# Patient Record
Sex: FEMALE | Race: WHITE | NOT HISPANIC OR LATINO
[De-identification: names, ages, dates, MRNs, and addresses within clinical notes are randomized per-mention and may not be internally consistent; named-entity substitution may affect disease eponyms.]

---

## 2017-06-19 ENCOUNTER — RESULT REVIEW (OUTPATIENT)
Age: 47
End: 2017-06-19

## 2019-11-29 ENCOUNTER — EMERGENCY (EMERGENCY)
Facility: HOSPITAL | Age: 49
LOS: 0 days | Discharge: HOME | End: 2019-11-29
Attending: HOSPITALIST
Payer: COMMERCIAL

## 2019-11-29 VITALS — WEIGHT: 139.99 LBS | HEIGHT: 64 IN

## 2019-11-29 VITALS
DIASTOLIC BLOOD PRESSURE: 60 MMHG | HEART RATE: 80 BPM | OXYGEN SATURATION: 97 % | TEMPERATURE: 99 F | RESPIRATION RATE: 18 BRPM | SYSTOLIC BLOOD PRESSURE: 109 MMHG

## 2019-11-29 DIAGNOSIS — R50.9 FEVER, UNSPECIFIED: ICD-10-CM

## 2019-11-29 DIAGNOSIS — M79.10 MYALGIA, UNSPECIFIED SITE: ICD-10-CM

## 2019-11-29 DIAGNOSIS — Z98.891 HISTORY OF UTERINE SCAR FROM PREVIOUS SURGERY: Chronic | ICD-10-CM

## 2019-11-29 DIAGNOSIS — R51 HEADACHE: ICD-10-CM

## 2019-11-29 DIAGNOSIS — N39.0 URINARY TRACT INFECTION, SITE NOT SPECIFIED: ICD-10-CM

## 2019-11-29 DIAGNOSIS — B34.9 VIRAL INFECTION, UNSPECIFIED: ICD-10-CM

## 2019-11-29 LAB
ALBUMIN SERPL ELPH-MCNC: 3.2 G/DL — LOW (ref 3.3–5)
ALP SERPL-CCNC: 53 U/L — SIGNIFICANT CHANGE UP (ref 40–120)
ALT FLD-CCNC: 25 U/L — SIGNIFICANT CHANGE UP (ref 12–78)
ANION GAP SERPL CALC-SCNC: 8 MMOL/L — SIGNIFICANT CHANGE UP (ref 5–17)
APPEARANCE UR: ABNORMAL
AST SERPL-CCNC: 22 U/L — SIGNIFICANT CHANGE UP (ref 15–37)
BASOPHILS # BLD AUTO: 0.04 K/UL — SIGNIFICANT CHANGE UP (ref 0–0.2)
BASOPHILS NFR BLD AUTO: 0.2 % — SIGNIFICANT CHANGE UP (ref 0–2)
BILIRUB SERPL-MCNC: 0.5 MG/DL — SIGNIFICANT CHANGE UP (ref 0.2–1.2)
BILIRUB UR-MCNC: NEGATIVE — SIGNIFICANT CHANGE UP
BUN SERPL-MCNC: 19 MG/DL — SIGNIFICANT CHANGE UP (ref 7–23)
CALCIUM SERPL-MCNC: 9.2 MG/DL — SIGNIFICANT CHANGE UP (ref 8.5–10.1)
CHLORIDE SERPL-SCNC: 97 MMOL/L — SIGNIFICANT CHANGE UP (ref 96–108)
CO2 SERPL-SCNC: 28 MMOL/L — SIGNIFICANT CHANGE UP (ref 22–31)
COLOR SPEC: YELLOW — SIGNIFICANT CHANGE UP
CREAT SERPL-MCNC: 1.12 MG/DL — SIGNIFICANT CHANGE UP (ref 0.5–1.3)
DIFF PNL FLD: ABNORMAL
EOSINOPHIL # BLD AUTO: 0.02 K/UL — SIGNIFICANT CHANGE UP (ref 0–0.5)
EOSINOPHIL NFR BLD AUTO: 0.1 % — SIGNIFICANT CHANGE UP (ref 0–6)
FLU A RESULT: SIGNIFICANT CHANGE UP
FLU A RESULT: SIGNIFICANT CHANGE UP
FLUAV AG NPH QL: SIGNIFICANT CHANGE UP
FLUBV AG NPH QL: SIGNIFICANT CHANGE UP
GLUCOSE SERPL-MCNC: 108 MG/DL — HIGH (ref 70–99)
GLUCOSE UR QL: NEGATIVE MG/DL — SIGNIFICANT CHANGE UP
HCT VFR BLD CALC: 40.8 % — SIGNIFICANT CHANGE UP (ref 34.5–45)
HGB BLD-MCNC: 13.5 G/DL — SIGNIFICANT CHANGE UP (ref 11.5–15.5)
IMM GRANULOCYTES NFR BLD AUTO: 0.7 % — SIGNIFICANT CHANGE UP (ref 0–1.5)
KETONES UR-MCNC: ABNORMAL
LEUKOCYTE ESTERASE UR-ACNC: ABNORMAL
LYMPHOCYTES # BLD AUTO: 1.13 K/UL — SIGNIFICANT CHANGE UP (ref 1–3.3)
LYMPHOCYTES # BLD AUTO: 5.3 % — LOW (ref 13–44)
MCHC RBC-ENTMCNC: 30 PG — SIGNIFICANT CHANGE UP (ref 27–34)
MCHC RBC-ENTMCNC: 33.1 GM/DL — SIGNIFICANT CHANGE UP (ref 32–36)
MCV RBC AUTO: 90.7 FL — SIGNIFICANT CHANGE UP (ref 80–100)
MONOCYTES # BLD AUTO: 2.86 K/UL — HIGH (ref 0–0.9)
MONOCYTES NFR BLD AUTO: 13.5 % — SIGNIFICANT CHANGE UP (ref 2–14)
NEUTROPHILS # BLD AUTO: 16.99 K/UL — HIGH (ref 1.8–7.4)
NEUTROPHILS NFR BLD AUTO: 80.2 % — HIGH (ref 43–77)
NITRITE UR-MCNC: NEGATIVE — SIGNIFICANT CHANGE UP
PH UR: 5 — SIGNIFICANT CHANGE UP (ref 5–8)
PLATELET # BLD AUTO: 336 K/UL — SIGNIFICANT CHANGE UP (ref 150–400)
POTASSIUM SERPL-MCNC: 3.6 MMOL/L — SIGNIFICANT CHANGE UP (ref 3.5–5.3)
POTASSIUM SERPL-SCNC: 3.6 MMOL/L — SIGNIFICANT CHANGE UP (ref 3.5–5.3)
PROT SERPL-MCNC: 8.2 GM/DL — SIGNIFICANT CHANGE UP (ref 6–8.3)
PROT UR-MCNC: 100 MG/DL
RBC # BLD: 4.5 M/UL — SIGNIFICANT CHANGE UP (ref 3.8–5.2)
RBC # FLD: 13.3 % — SIGNIFICANT CHANGE UP (ref 10.3–14.5)
RSV RESULT: SIGNIFICANT CHANGE UP
RSV RNA RESP QL NAA+PROBE: SIGNIFICANT CHANGE UP
SODIUM SERPL-SCNC: 133 MMOL/L — LOW (ref 135–145)
SP GR SPEC: 1.02 — SIGNIFICANT CHANGE UP (ref 1.01–1.02)
UROBILINOGEN FLD QL: NEGATIVE MG/DL — SIGNIFICANT CHANGE UP
WBC # BLD: 21.18 K/UL — HIGH (ref 3.8–10.5)
WBC # FLD AUTO: 21.18 K/UL — HIGH (ref 3.8–10.5)

## 2019-11-29 PROCEDURE — 80053 COMPREHEN METABOLIC PANEL: CPT

## 2019-11-29 PROCEDURE — 99284 EMERGENCY DEPT VISIT MOD MDM: CPT | Mod: 25

## 2019-11-29 PROCEDURE — 85007 BL SMEAR W/DIFF WBC COUNT: CPT

## 2019-11-29 PROCEDURE — 96361 HYDRATE IV INFUSION ADD-ON: CPT

## 2019-11-29 PROCEDURE — 36415 COLL VENOUS BLD VENIPUNCTURE: CPT

## 2019-11-29 PROCEDURE — 87186 SC STD MICRODIL/AGAR DIL: CPT

## 2019-11-29 PROCEDURE — 85025 COMPLETE CBC W/AUTO DIFF WBC: CPT

## 2019-11-29 PROCEDURE — 81001 URINALYSIS AUTO W/SCOPE: CPT

## 2019-11-29 PROCEDURE — 87086 URINE CULTURE/COLONY COUNT: CPT

## 2019-11-29 PROCEDURE — 71046 X-RAY EXAM CHEST 2 VIEWS: CPT

## 2019-11-29 PROCEDURE — 96374 THER/PROPH/DIAG INJ IV PUSH: CPT

## 2019-11-29 PROCEDURE — 99284 EMERGENCY DEPT VISIT MOD MDM: CPT

## 2019-11-29 PROCEDURE — 87631 RESP VIRUS 3-5 TARGETS: CPT

## 2019-11-29 PROCEDURE — 71046 X-RAY EXAM CHEST 2 VIEWS: CPT | Mod: 26

## 2019-11-29 PROCEDURE — 96375 TX/PRO/DX INJ NEW DRUG ADDON: CPT

## 2019-11-29 RX ORDER — SODIUM CHLORIDE 9 MG/ML
1000 INJECTION INTRAMUSCULAR; INTRAVENOUS; SUBCUTANEOUS ONCE
Refills: 0 | Status: COMPLETED | OUTPATIENT
Start: 2019-11-29 | End: 2019-11-29

## 2019-11-29 RX ORDER — ONDANSETRON 8 MG/1
4 TABLET, FILM COATED ORAL ONCE
Refills: 0 | Status: COMPLETED | OUTPATIENT
Start: 2019-11-29 | End: 2019-11-29

## 2019-11-29 RX ORDER — ACETAMINOPHEN 500 MG
650 TABLET ORAL ONCE
Refills: 0 | Status: COMPLETED | OUTPATIENT
Start: 2019-11-29 | End: 2019-11-29

## 2019-11-29 RX ORDER — CEPHALEXIN 500 MG
1 CAPSULE ORAL
Qty: 14 | Refills: 0
Start: 2019-11-29 | End: 2019-12-05

## 2019-11-29 RX ORDER — KETOROLAC TROMETHAMINE 30 MG/ML
15 SYRINGE (ML) INJECTION ONCE
Refills: 0 | Status: DISCONTINUED | OUTPATIENT
Start: 2019-11-29 | End: 2019-11-29

## 2019-11-29 RX ORDER — CEPHALEXIN 500 MG
500 CAPSULE ORAL ONCE
Refills: 0 | Status: COMPLETED | OUTPATIENT
Start: 2019-11-29 | End: 2019-11-29

## 2019-11-29 RX ADMIN — SODIUM CHLORIDE 1000 MILLILITER(S): 9 INJECTION INTRAMUSCULAR; INTRAVENOUS; SUBCUTANEOUS at 19:15

## 2019-11-29 RX ADMIN — Medication 650 MILLIGRAM(S): at 19:49

## 2019-11-29 RX ADMIN — Medication 500 MILLIGRAM(S): at 19:49

## 2019-11-29 RX ADMIN — Medication 15 MILLIGRAM(S): at 18:21

## 2019-11-29 RX ADMIN — SODIUM CHLORIDE 1000 MILLILITER(S): 9 INJECTION INTRAMUSCULAR; INTRAVENOUS; SUBCUTANEOUS at 18:10

## 2019-11-29 RX ADMIN — SODIUM CHLORIDE 1000 MILLILITER(S): 9 INJECTION INTRAMUSCULAR; INTRAVENOUS; SUBCUTANEOUS at 20:00

## 2019-11-29 RX ADMIN — Medication 15 MILLIGRAM(S): at 19:47

## 2019-11-29 RX ADMIN — SODIUM CHLORIDE 1000 MILLILITER(S): 9 INJECTION INTRAMUSCULAR; INTRAVENOUS; SUBCUTANEOUS at 21:00

## 2019-11-29 RX ADMIN — ONDANSETRON 4 MILLIGRAM(S): 8 TABLET, FILM COATED ORAL at 18:21

## 2019-11-29 NOTE — ED STATDOCS - PROGRESS NOTE DETAILS
48 yr. old female PMH: UC, presents to ED with fever for 3 days and max of 103.5 + headache and body aches. 48 yr. old female PMH: UC,  presents to ED with fever for 3 days and max of 103.5 + headache and body aches. Reviewed results of Lab work and Chest X-Ray. Instructed to take Keflex as prescribed and Ibuprofen 600 mg. PO every 6 hrs. for pain starting tomorrow afternoon. Return precautions reviewed with patient. Lawanda Orozco 48 yr. old female PMH: UC,  presents to ED with fever for 3 days and max of 103.5 + nausea, chills, headache and body aches. Reports no dysuria but fowl odor to urine and dark. Seen at Urgent Care and had negative Flu swab. Seen an examined by attending in intake. Plan: IV, Labs, Toradol, Chest X-ray. Will f/U with results and re evaluate. Lawanda NP Reviewed results of Lab work and Chest X-Ray. Instructed to take Keflex as prescribed and Ibuprofen 600 mg. PO every 6 hrs. for pain starting tomorrow afternoon. Return precautions reviewed with patient.   Lawanda GUTIÉRREZ VS repeated    will order another liter of saline. MTangmarjoriei NP

## 2019-11-29 NOTE — ED STATDOCS - MUSCULOSKELETAL, MLM
range of motion is not limited and there is no muscle tenderness. No nuchal rigidity, full ROM of neck. No CVA tenderness

## 2019-11-29 NOTE — ED STATDOCS - NS_ ATTENDINGSCRIBEDETAILS _ED_A_ED_FT
Faye Nunez MD: The history, relevant review of systems, past medical and surgical history, medical decision making, and physical examination was documented by the scribe in my presence and I attest to the accuracy of the documentation.

## 2019-11-29 NOTE — ED STATDOCS - PATIENT PORTAL LINK FT
You can access the FollowMyHealth Patient Portal offered by [facility] by registering at the following website: [email or web address]. By joining [employer]’s FollowMyHealth portal, you will also be able to view your health information using other applications (apps) compatible with our system. You can access the FollowMyHealth Patient Portal offered by [facility] by registering at the following website: [email or web address]. By joining [employer]’s FollowMyHealth portal, you will also be able to view your health information using other applications (apps) compatible with our system.

## 2019-11-29 NOTE — ED ADULT NURSE NOTE - OBJECTIVE STATEMENT
pt presents to ED c/o fevers w/ nausea and HA x3days, Tmax 103.5. denies vomiting and dizziness. reports taking advil Q4-6hrs w/ minimal relief. Pt AOx4, breathing symmetrical and unlabored, in no acute distress at this time.

## 2019-11-29 NOTE — ED STATDOCS - CLINICAL SUMMARY MEDICAL DECISION MAKING FREE TEXT BOX
49 y/o female with PMHx of ulcerative colitis and  x3 presents to the ED with HA and fever x 3 days. Also with general malaise. Pt is well appearing likely viral. Will get labs with flu PCR, give fluids Toradol and reassess. No clinical signs or sx of meningitis.

## 2019-11-29 NOTE — ED STATDOCS - OBJECTIVE STATEMENT
47 y/o female with PMHx of ulcerative colitis and  x3 presents to the ED c/o +fever x3 days. Tmax 103.5. Endorses associated +HA, +body aches, +nausea, and +chills. No neck stiffness or dysuria. Has been taken Advil every 4 hours with no relief. Went to  when sx began 3 days ago and had negative flu swab. Denies sick contacts or recent travel. NKDA. PCP: Dr. Janiya Cade.

## 2019-11-29 NOTE — ED ADULT TRIAGE NOTE - CHIEF COMPLAINT QUOTE
c/o headache and fever for past 2 days, Tmax 103.5, pt states she has been taking advil every 4 hours with no relief in symptoms, pt went to urgent care, had flu test completed which was negative, denies URI symptoms

## 2019-12-01 NOTE — ED POST DISCHARGE NOTE - DETAILS
Urine culture + for E. coli >100K CFU. treated with keflex to which bacteria is sensitive. - TOM RosarioC

## 2019-12-04 ENCOUNTER — TRANSCRIPTION ENCOUNTER (OUTPATIENT)
Age: 49
End: 2019-12-04

## 2019-12-26 ENCOUNTER — RESULT REVIEW (OUTPATIENT)
Age: 49
End: 2019-12-26

## 2020-05-06 NOTE — ED STATDOCS - NSTIMEPROVIDERCAREINITIATE_GEN_ER
Mild KRIS noted with decrease in GFR, reserved creatinine. Will give light IVF hydration. Trend renal function with CMP.      29-Nov-2019 17:12

## 2021-02-23 ENCOUNTER — TRANSCRIPTION ENCOUNTER (OUTPATIENT)
Age: 51
End: 2021-02-23

## 2021-05-04 ENCOUNTER — NON-APPOINTMENT (OUTPATIENT)
Age: 51
End: 2021-05-04

## 2021-05-04 DIAGNOSIS — Z78.9 OTHER SPECIFIED HEALTH STATUS: ICD-10-CM

## 2021-05-04 DIAGNOSIS — Z87.898 PERSONAL HISTORY OF OTHER SPECIFIED CONDITIONS: ICD-10-CM

## 2021-05-04 DIAGNOSIS — Z86.79 PERSONAL HISTORY OF OTHER DISEASES OF THE CIRCULATORY SYSTEM: ICD-10-CM

## 2021-05-04 DIAGNOSIS — N92.6 IRREGULAR MENSTRUATION, UNSPECIFIED: ICD-10-CM

## 2021-05-04 DIAGNOSIS — Z92.89 PERSONAL HISTORY OF OTHER MEDICAL TREATMENT: ICD-10-CM

## 2021-07-28 ENCOUNTER — TRANSCRIPTION ENCOUNTER (OUTPATIENT)
Age: 51
End: 2021-07-28

## 2021-08-17 PROBLEM — K51.90 ULCERATIVE COLITIS, UNSPECIFIED, WITHOUT COMPLICATIONS: Chronic | Status: ACTIVE | Noted: 2019-11-29

## 2021-08-19 ENCOUNTER — APPOINTMENT (OUTPATIENT)
Dept: FAMILY MEDICINE | Facility: CLINIC | Age: 51
End: 2021-08-19
Payer: COMMERCIAL

## 2021-08-19 VITALS
HEART RATE: 58 BPM | TEMPERATURE: 97.5 F | HEIGHT: 64 IN | DIASTOLIC BLOOD PRESSURE: 68 MMHG | SYSTOLIC BLOOD PRESSURE: 100 MMHG | BODY MASS INDEX: 23.9 KG/M2 | OXYGEN SATURATION: 98 % | WEIGHT: 140 LBS

## 2021-08-19 PROCEDURE — 99213 OFFICE O/P EST LOW 20 MIN: CPT

## 2021-08-19 RX ORDER — DEXTROAMPHETAMINE SULFATE, DEXTROAMPHETAMINE SACCHARATE, AMPHETAMINE SULFATE AND AMPHETAMINE ASPARTATE 5; 5; 5; 5 MG/1; MG/1; MG/1; MG/1
20 CAPSULE, EXTENDED RELEASE ORAL DAILY
Refills: 0 | Status: ACTIVE | COMMUNITY
Start: 2021-08-19
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
## 2021-08-19 NOTE — PLAN
[FreeTextEntry1] : Patient is advised to continue with her current regimen, as this does work well for her.  She is also advised to make sure to rely on her support system, and to work on coping mechanisms for her anxiety.  She does fill her medication every 3-4 months, and she does use this as needed.  She will also continue to follow up with neuro for her ADD medication.

## 2021-12-03 ENCOUNTER — APPOINTMENT (OUTPATIENT)
Dept: FAMILY MEDICINE | Facility: CLINIC | Age: 51
End: 2021-12-03

## 2022-01-20 ENCOUNTER — APPOINTMENT (OUTPATIENT)
Dept: FAMILY MEDICINE | Facility: CLINIC | Age: 52
End: 2022-01-20
Payer: COMMERCIAL

## 2022-01-20 ENCOUNTER — MED ADMIN CHARGE (OUTPATIENT)
Age: 52
End: 2022-01-20

## 2022-01-20 VITALS
HEART RATE: 75 BPM | TEMPERATURE: 97.5 F | BODY MASS INDEX: 23.9 KG/M2 | OXYGEN SATURATION: 98 % | HEIGHT: 64 IN | DIASTOLIC BLOOD PRESSURE: 62 MMHG | WEIGHT: 140 LBS | SYSTOLIC BLOOD PRESSURE: 104 MMHG

## 2022-01-20 PROCEDURE — G0008: CPT

## 2022-01-20 PROCEDURE — 90686 IIV4 VACC NO PRSV 0.5 ML IM: CPT

## 2022-01-20 PROCEDURE — 36415 COLL VENOUS BLD VENIPUNCTURE: CPT

## 2022-01-20 PROCEDURE — 99213 OFFICE O/P EST LOW 20 MIN: CPT | Mod: 25

## 2022-01-20 NOTE — CURRENT MEDS
[Takes medication as prescribed] : takes [None] : Patient does not have any barriers to medication adherence [Yes] : Reviewed medication list for presence of high-risk medications. [Benzodiazepines] : benzodiazepines [FreeTextEntry1] : counseled on medication use

## 2022-01-20 NOTE — PHYSICAL EXAM
[No Acute Distress] : no acute distress [Well Nourished] : well nourished [Well Developed] : well developed [No Lymphadenopathy] : no lymphadenopathy [No Respiratory Distress] : no respiratory distress  [No Accessory Muscle Use] : no accessory muscle use [Clear to Auscultation] : lungs were clear to auscultation bilaterally [Soft] : abdomen soft [Non Tender] : non-tender [Non-distended] : non-distended [Normal Posterior Cervical Nodes] : no posterior cervical lymphadenopathy [Normal Anterior Cervical Nodes] : no anterior cervical lymphadenopathy [No Rash] : no rash [Coordination Grossly Intact] : coordination grossly intact [Normal Gait] : normal gait [Normal Mental Status] : the patient's orientation, memory, attention, language and fund of knowledge were normal [Appropriate] : appropriate [Normal Rate] : a normal rate [Normal Rhythm] : a normal rhythm [Normal Tone] : normal tone [Normal Volume] : normal volume [Normal] : normal throught processes [Normal Associations] :  no deficiency [Impaired judgment] : intact judgment [Impaired Insight] : intact insight [Delusions] : exhibited no delusions [Hallucinations] : exhibited no hallucinations [Obsessions] : denied obsessions [Preoccupation with Violence] : denied preoccupation with violent thoughts [Suicidal Ideation] : denied suicidal ideation [Suicidal Intent] : denied suicidal intent [Suicidal Plan] : denied suicidal plans [Homicidal Ideation] : denied homicidal ideation [Homicidal Intent] : denied homicidal intention [Homicidal Plan] : denied homicidal plans

## 2022-01-20 NOTE — HEALTH RISK ASSESSMENT
[Never] : Never [Yes] : Yes [Monthly or less (1 pt)] : Monthly or less (1 point) [1 or 2 (0 pts)] : 1 or 2 (0 points) [Never (0 pts)] : Never (0 points) [No] : In the past 12 months have you used drugs other than those required for medical reasons? No [No falls in past year] : Patient reported no falls in the past year [0] : 2) Feeling down, depressed, or hopeless: Not at all (0) [PHQ-2 Negative - No further assessment needed] : PHQ-2 Negative - No further assessment needed [Audit-CScore] : 1 [[id number]] : 0 [Patient/Caregiver not ready to engage] : , patient/caregiver not ready to engage [AdvancecareDate] : 01/22

## 2022-01-20 NOTE — HISTORY OF PRESENT ILLNESS
[FreeTextEntry1] : here for f/u anxiety  [de-identified] : this is a 50yo pmhx ulcerative colitis/ thrombocytosis, iron deficiency/ anxiety, here for f/u chronic concerns and medication renewal.\par o/w in no acute distress, no other major concerns and reports feeling well. \par will evaluate and manage as appropriate.

## 2022-01-20 NOTE — COUNSELING
[Fall prevention counseling provided] : Fall prevention counseling provided [Behavioral health counseling provided] : Behavioral health counseling provided [Sleep ___ hours/day] : Sleep [unfilled] hours/day [Engage in a relaxing activity] : Engage in a relaxing activity [Plan in advance] : Plan in advance [None] : None [Good understanding] : Patient has a good understanding of lifestyle changes and steps needed to achieve self management goal [de-identified] : Maintain balanced diet of whole grain, fruits and vegetables, lean meats, skinless poultry, fish, beans, soy products, eggs, nuts, and low fat dairy as per FDA dietary guidelines. \par Avoid high calorie/low nutrient foods. \par vegetables/fruits- at least 5 servings/day, \par whole grains- 100% whole grain and at least 3 grams fiber/day.\par reduce/eliminate saturated fat- less than 5% on nutrition labels (ex. dugan, deli meat, hot dogs, fried foods, cookies, ice cream, chips, soft drinks, etc.)\par stay within your FDA recommended daily calorie needs or use the plate method to portion intake. \par Avoid fast food and limit eating out. When eating out choose healthy alternatives (ex. grilled, baked, steamed. low fat dressings. avoid french fries).\par DO NOT CONSUME FOODS YOU ARE ALLERGIC TO DESPITE DIETARY RECOMMENDATIONS.\par \par For more information you can visit www.Health.gov \par \par Incorporate regular physical activity most days of the week. \par Regular aerobic activity- moderate intensity, most days/wk, at least 30min/day- ex. brisk walk 2.5miles/hr, ballroom dancing, water aerobics, light yard work (raking/lawn mowing) actively playing basketball, biking 10miles/hr.\par Muscle strengthening and strength/resistance exercises 2-3days/wk- ex. free weights, resistance bands, your own weight (squats/pull-ups/push-ups).\par Neuro-motor exercises for balance/agility/coordination and flexibility exercises 2-3days/wk, 20-30min/day- ex. yoga and melanie-chi.\par Bone strengthening at least 30min/day, most days of the week- ex. weights, resistance bands, running, brisk walking, jumping rope, stair climbing, tennis.\par Decrease sedentary time. \par LISTEN TO YOUR BODY AND MODIFY ACTIVITY AS NEEDED- DO NOT OVEREXERT YOURSELF DURING PHYSICAL ACTIVITY DESPITE RECOMMENDATION.\par \par For more information you can visit www.Health.gov \par \par Depression/Anxiety are mood disorders. The symptoms of depression/anxiety can affect how you think and feel and how you handle every day activities (work, eating, sleeping). Multiple treatments are available such as psychotherapy/counseling, medications called antidepressants or anxiolytics, or other therapies. \par Some medications can take 2-4 weeks to work and can have side effects; notify our office if you experience any side effects. Suicidal thoughts or attempt may occur in some people, especially if agitated when first initiating medication, before it begins to work; if suicidal thoughts/ideations or suicidal attempt- call 911 for emergency services/go to the nearest emergency department. \par IF IN CRISIS YOU HAVE SUPPORT= CALL 911/EMERGENCY SERVICES, CALL [facility] [phone], CALL OUR OFFICE.

## 2022-01-20 NOTE — ASSESSMENT
[FreeTextEntry1] : anxiety\par reports as well managed on Clonazepam, infrequent use of sx mgmt, reports tolerating well and denies concerns, counseled on medication use. \par medication renewal provided as requested \par attends counseling as needed, refuses need for psychiatry at this time, reports her daily anxiety is well managed and denies need for long acting non- benzo medication at this time. \par check BW cmp - "labs drawn in office" \par \par Ulcerative Colitis\par manages conservatively, restarted Ascol for recent flare\par in no acute distress and o/w offers no complaints \par Gastro Dr. Roberson, scheduling f/u\par \par thrombocytosis/ negative myeloproliferative workup 05/2021\par was under mgmt of Dr. Eagle bee, as per last visit note 05/2021, specialist advised iron supplementation and f/u in 4 months for recheck and continue evaluation, patient reports compliance with iron supplement however has refused to reschedule f/u, NP advised patient to f/u as recommended for further evaluation thrombocytosis, patient continues to refuse, agrees to recheck of cbc/ iron studies. also advised by Dr. Guillermo to complete colonoscopy, patient also has not completed. \par in no acute distress and o/w offers no complaints \par check labs\par \par requesting screen of lipid/ a1c\par in no acute distress and o/w offers no complaints \par annual 05/2022 with Dr. MARILOU Bethea\par flu vaccine administered today, counseled on vaccine, tolerated well. \par Pap 06/18/2021, normal Dr. Navarro \par Mammo 01/05/2022, normal\par CX 2020, reports findings c/w colitis\par continued follow up with PCP for chronic concerns and preventative health management. \par patient verbalized understanding and agrees with plan of care. \par

## 2022-01-21 ENCOUNTER — NON-APPOINTMENT (OUTPATIENT)
Age: 52
End: 2022-01-21

## 2022-01-21 LAB
ALBUMIN SERPL ELPH-MCNC: 4.6 G/DL
ALP BLD-CCNC: 44 U/L
ALT SERPL-CCNC: 23 U/L
ANION GAP SERPL CALC-SCNC: 12 MMOL/L
AST SERPL-CCNC: 30 U/L
BASOPHILS # BLD AUTO: 0.09 K/UL
BASOPHILS NFR BLD AUTO: 1.1 %
BILIRUB SERPL-MCNC: 0.3 MG/DL
BUN SERPL-MCNC: 18 MG/DL
CALCIUM SERPL-MCNC: 9.9 MG/DL
CHLORIDE SERPL-SCNC: 101 MMOL/L
CHOLEST SERPL-MCNC: 252 MG/DL
CO2 SERPL-SCNC: 28 MMOL/L
CREAT SERPL-MCNC: 0.93 MG/DL
EOSINOPHIL # BLD AUTO: 0.35 K/UL
EOSINOPHIL NFR BLD AUTO: 4.1 %
ESTIMATED AVERAGE GLUCOSE: 103 MG/DL
FERRITIN SERPL-MCNC: 87 NG/ML
FOLATE SERPL-MCNC: >20 NG/ML
GLUCOSE SERPL-MCNC: 61 MG/DL
HBA1C MFR BLD HPLC: 5.2 %
HCT VFR BLD CALC: 44.6 %
HDLC SERPL-MCNC: 83 MG/DL
HGB BLD-MCNC: 14.3 G/DL
IMM GRANULOCYTES NFR BLD AUTO: 0.4 %
IRON SATN MFR SERPL: 29 %
IRON SERPL-MCNC: 100 UG/DL
LDLC SERPL CALC-MCNC: 145 MG/DL
LYMPHOCYTES # BLD AUTO: 1.49 K/UL
LYMPHOCYTES NFR BLD AUTO: 17.4 %
MAN DIFF?: NORMAL
MCHC RBC-ENTMCNC: 30.8 PG
MCHC RBC-ENTMCNC: 32.1 GM/DL
MCV RBC AUTO: 95.9 FL
MONOCYTES # BLD AUTO: 0.93 K/UL
MONOCYTES NFR BLD AUTO: 10.9 %
NEUTROPHILS # BLD AUTO: 5.67 K/UL
NEUTROPHILS NFR BLD AUTO: 66.1 %
NONHDLC SERPL-MCNC: 170 MG/DL
PLATELET # BLD AUTO: 454 K/UL
POTASSIUM SERPL-SCNC: 4.2 MMOL/L
PROT SERPL-MCNC: 7.5 G/DL
RBC # BLD: 4.65 M/UL
RBC # FLD: 14 %
SODIUM SERPL-SCNC: 141 MMOL/L
TIBC SERPL-MCNC: 342 UG/DL
TRIGL SERPL-MCNC: 121 MG/DL
UIBC SERPL-MCNC: 242 UG/DL
VIT B12 SERPL-MCNC: 644 PG/ML
WBC # FLD AUTO: 8.56 K/UL

## 2022-02-04 ENCOUNTER — RX RENEWAL (OUTPATIENT)
Age: 52
End: 2022-02-04

## 2022-02-09 ENCOUNTER — RX RENEWAL (OUTPATIENT)
Age: 52
End: 2022-02-09

## 2022-02-10 ENCOUNTER — TRANSCRIPTION ENCOUNTER (OUTPATIENT)
Age: 52
End: 2022-02-10

## 2022-02-14 ENCOUNTER — FORM ENCOUNTER (OUTPATIENT)
Age: 52
End: 2022-02-14

## 2022-02-17 ENCOUNTER — APPOINTMENT (OUTPATIENT)
Dept: FAMILY MEDICINE | Facility: CLINIC | Age: 52
End: 2022-02-17
Payer: COMMERCIAL

## 2022-02-17 PROCEDURE — 99212 OFFICE O/P EST SF 10 MIN: CPT | Mod: 95

## 2022-02-17 NOTE — COUNSELING
[Fall prevention counseling provided] : Fall prevention counseling provided [Behavioral health counseling provided] : Behavioral health counseling provided [Sleep ___ hours/day] : Sleep [unfilled] hours/day [Engage in a relaxing activity] : Engage in a relaxing activity [Plan in advance] : Plan in advance [None] : None [Good understanding] : Patient has a good understanding of lifestyle changes and steps needed to achieve self management goal [de-identified] : Maintain balanced diet of whole grain, fruits and vegetables, lean meats, skinless poultry, fish, beans, soy products, eggs, nuts, and low fat dairy as per FDA dietary guidelines. \par Avoid high calorie/low nutrient foods. \par vegetables/fruits- at least 5 servings/day, \par whole grains- 100% whole grain and at least 3 grams fiber/day.\par reduce/eliminate saturated fat- less than 5% on nutrition labels (ex. dugan, deli meat, hot dogs, fried foods, cookies, ice cream, chips, soft drinks, etc.)\par stay within your FDA recommended daily calorie needs or use the plate method to portion intake. \par Avoid fast food and limit eating out. When eating out choose healthy alternatives (ex. grilled, baked, steamed. low fat dressings. avoid french fries).\par DO NOT CONSUME FOODS YOU ARE ALLERGIC TO DESPITE DIETARY RECOMMENDATIONS.\par \par For more information you can visit www.Health.gov \par \par Incorporate regular physical activity most days of the week. \par Regular aerobic activity- moderate intensity, most days/wk, at least 30min/day- ex. brisk walk 2.5miles/hr, ballroom dancing, water aerobics, light yard work (raking/lawn mowing) actively playing basketball, biking 10miles/hr.\par Muscle strengthening and strength/resistance exercises 2-3days/wk- ex. free weights, resistance bands, your own weight (squats/pull-ups/push-ups).\par Neuro-motor exercises for balance/agility/coordination and flexibility exercises 2-3days/wk, 20-30min/day- ex. yoga and melanie-chi.\par Bone strengthening at least 30min/day, most days of the week- ex. weights, resistance bands, running, brisk walking, jumping rope, stair climbing, tennis.\par Decrease sedentary time. \par LISTEN TO YOUR BODY AND MODIFY ACTIVITY AS NEEDED- DO NOT OVEREXERT YOURSELF DURING PHYSICAL ACTIVITY DESPITE RECOMMENDATION.\par \par For more information you can visit www.Health.gov \par \par Depression/Anxiety are mood disorders. The symptoms of depression/anxiety can affect how you think and feel and how you handle every day activities (work, eating, sleeping). Multiple treatments are available such as psychotherapy/counseling, medications called antidepressants or anxiolytics, or other therapies. \par Some medications can take 2-4 weeks to work and can have side effects; notify our office if you experience any side effects. Suicidal thoughts or attempt may occur in some people, especially if agitated when first initiating medication, before it begins to work; if suicidal thoughts/ideations or suicidal attempt- call 911 for emergency services/go to the nearest emergency department. \par IF IN CRISIS YOU HAVE SUPPORT= CALL 911/EMERGENCY SERVICES, CALL [facility] [phone], CALL OUR OFFICE.

## 2022-02-17 NOTE — REVIEW OF SYSTEMS
[Negative] : Heme/Lymph [Skin Rash] : no skin rash [de-identified] : ADD [de-identified] : anxiety well managed

## 2022-02-17 NOTE — HEALTH RISK ASSESSMENT
[Never] : Never [Yes] : Yes [Monthly or less (1 pt)] : Monthly or less (1 point) [1 or 2 (0 pts)] : 1 or 2 (0 points) [Never (0 pts)] : Never (0 points) [No] : In the past 12 months have you used drugs other than those required for medical reasons? No [No falls in past year] : Patient reported no falls in the past year [0] : 2) Feeling down, depressed, or hopeless: Not at all (0) [PHQ-2 Negative - No further assessment needed] : PHQ-2 Negative - No further assessment needed [Patient/Caregiver not ready to engage] : , patient/caregiver not ready to engage [Audit-CScore] : 1 [ETS7Moelf] : 0 [AdvancecareDate] : 02/22

## 2022-02-17 NOTE — PHYSICAL EXAM
[No Acute Distress] : no acute distress [Well Nourished] : well nourished [Well Developed] : well developed [Normal Sclera/Conjunctiva] : normal sclera/conjunctiva [No Respiratory Distress] : no respiratory distress  [No Accessory Muscle Use] : no accessory muscle use [Soft] : abdomen soft [Non Tender] : non-tender [Non-distended] : non-distended [No Rash] : no rash [Coordination Grossly Intact] : coordination grossly intact [Normal Mental Status] : the patient's orientation, memory, attention, language and fund of knowledge were normal [Appropriate] : appropriate [Normal Rate] : a normal rate [Normal Rhythm] : a normal rhythm [Normal Tone] : normal tone [Normal Volume] : normal volume [Normal] : normal throught processes [Normal Associations] :  no deficiency [Impaired judgment] : intact judgment [Impaired Insight] : intact insight [Delusions] : exhibited no delusions [Hallucinations] : exhibited no hallucinations [Obsessions] : denied obsessions [Preoccupation with Violence] : denied preoccupation with violent thoughts [Suicidal Ideation] : denied suicidal ideation [Suicidal Intent] : denied suicidal intent [Suicidal Plan] : denied suicidal plans [Homicidal Ideation] : denied homicidal ideation [Homicidal Intent] : denied homicidal intention [Homicidal Plan] : denied homicidal plans [de-identified] : o/w unable to assess r/t remote telehealth visit  [de-identified] : o/w unable to assess r/t remote telehealth visit  [de-identified] : per patient

## 2022-02-17 NOTE — HISTORY OF PRESENT ILLNESS
[Home] : at home, [unfilled] , at the time of the visit. [Medical Office: ([facility])___] : at the medical office located in  [Verbal consent obtained from patient] : the patient, [unfilled] [FreeTextEntry1] : here for f/u anxiety  [de-identified] : this is a 52yo pmhx ulcerative colitis/ thrombocytosis, iron deficiency/ anxiety, here for f/u anxiety and medication renewal.\par o/w in no acute distress, no other major concerns and reports feeling well. \par will evaluate and manage as appropriate.

## 2022-02-17 NOTE — ASSESSMENT
[FreeTextEntry1] : anxiety\par reports as well managed on Clonazepam, infrequent use for sx mgmt, reports tolerating well and denies concerns, counseled on medication use. \par medication renewal provided as requested \par conservative measures \par attends counseling as needed, refuses need for psychiatry at this time, reports her daily anxiety is well managed and denies need for long acting non- benzo medication at this time. \par agrees to continued use of telehealth and quarterly in office visit for continued renewals. \par \par other Pmhx \par Ulcerative Colitis\par manages conservatively, restarted Ascol for recent flare\par in no acute distress and o/w offers no complaints \par Gastro Dr. Roberson\par \par thrombocytosis/ negative myeloproliferative workup 05/2021\par Dr. Guillermo hemonc\par most recent BW stable 01/22\par in no acute distress and o/w offers no complaints \par \par elevated ldl and total cholesterol BW 01/22\par c/w lifestyle modifications\par fasting BW at annual \par \par annual scheduled 05/2022 with Dr. MARILOU Bethea\par UTD vaccines\par Pap 06/18/2021, normal Dr. Navarro \par Mammo 01/05/2022, normal\par CX 2020, reports findings c/w colitis\par continued follow up with PCP for chronic concerns and preventative health management. \par patient verbalized understanding and agrees with plan of care. \par

## 2022-02-17 NOTE — CURRENT MEDS
[Takes medication as prescribed] : takes [None] : Patient does not have any barriers to medication adherence [Yes] : Reviewed medication list for presence of high-risk medications. [Benzodiazepines] : benzodiazepines [FreeTextEntry1] : counseled on use, reports taking medication as prescribed, tolerating well.

## 2022-03-24 ENCOUNTER — APPOINTMENT (OUTPATIENT)
Dept: FAMILY MEDICINE | Facility: CLINIC | Age: 52
End: 2022-03-24

## 2022-03-29 ENCOUNTER — APPOINTMENT (OUTPATIENT)
Dept: FAMILY MEDICINE | Facility: CLINIC | Age: 52
End: 2022-03-29
Payer: COMMERCIAL

## 2022-03-29 VITALS
TEMPERATURE: 97.5 F | OXYGEN SATURATION: 97 % | HEART RATE: 78 BPM | WEIGHT: 138 LBS | DIASTOLIC BLOOD PRESSURE: 70 MMHG | HEIGHT: 64 IN | SYSTOLIC BLOOD PRESSURE: 110 MMHG | BODY MASS INDEX: 23.56 KG/M2

## 2022-03-29 PROCEDURE — 36415 COLL VENOUS BLD VENIPUNCTURE: CPT

## 2022-03-29 PROCEDURE — 99213 OFFICE O/P EST LOW 20 MIN: CPT | Mod: 25

## 2022-03-29 RX ORDER — ESTROGEN,ESTER/ME-TESTOSTERONE 1.25-2.5MG
TABLET ORAL
Refills: 0 | Status: DISCONTINUED | COMMUNITY
End: 2022-03-29

## 2022-03-29 RX ORDER — IRON/IRON ASP GLY/FA/MV-MIN 38 125-25-1MG
TABLET ORAL
Refills: 0 | Status: DISCONTINUED | COMMUNITY
End: 2022-03-29

## 2022-03-29 NOTE — REVIEW OF SYSTEMS
[Abdominal Pain] : no abdominal pain [Diarrhea] : diarrhea [Vomiting] : no vomiting [Suicidal] : not suicidal [Anxiety] : anxiety [Depression] : no depression [Negative] : Heme/Lymph

## 2022-03-29 NOTE — HEALTH RISK ASSESSMENT
[Never] : Never [Yes] : Yes [2 - 4 times a month (2 pts)] : 2-4 times a month (2 points) [1 or 2 (0 pts)] : 1 or 2 (0 points) [No] : In the past 12 months have you used drugs other than those required for medical reasons? No [Never (0 pts)] : Never (0 points) [No falls in past year] : Patient reported no falls in the past year [0] : 2) Feeling down, depressed, or hopeless: Not at all (0) [PHQ-2 Negative - No further assessment needed] : PHQ-2 Negative - No further assessment needed [With Patient/Caregiver] : , with patient/caregiver [I will adhere to the patient's wishes.] : I will adhere to the patient's wishes. [Audit-CScore] : 2 [de-identified] : active, exercises regularly,  [de-identified] : well balanced, starting to avoid gluten for UC symptoms. [[id number]] : 0 [FreeTextEntry4] : pt to be provided with forms at check out today.

## 2022-03-29 NOTE — HISTORY OF PRESENT ILLNESS
[FreeTextEntry1] : Follow up anxiety. [de-identified] : Patient is a 50yo female with PMH anxiety, UC, iron deficiency anemia who presents to the office for follow up. \par \par In regards to anxiety, patient has been taking Clonazepam 0.5mg, ½ to 1 tablet every 8 hours as needed for anxiety symptoms.  Pt states she takes Clonazepam approximately 4 times per week.  Patient is not interested in starting an SSRI at this time.  Patient does not follow with psychiatry.  Patient does follow with psychologist as needed.\par \par Last RF for Clonazepam was 02/19/2022 by our office, HOLA Aguirre. \par Has history of ADD, takes Adderall prescribed by Neurology, Dr. Li. \par \par Pt has been having issues with UC recently, has been ongoing for months.  Pt received referral to see Dr. Viktoria Hastings who she will be following up with.

## 2022-03-30 ENCOUNTER — NON-APPOINTMENT (OUTPATIENT)
Age: 52
End: 2022-03-30

## 2022-03-30 LAB
COVID-19 SPIKE DOMAIN ANTIBODY INTERPRETATION: POSITIVE
SARS-COV-2 AB SERPL IA-ACNC: 67.1 U/ML

## 2022-04-30 DIAGNOSIS — Z13.220 ENCOUNTER FOR SCREENING FOR LIPOID DISORDERS: ICD-10-CM

## 2022-04-30 DIAGNOSIS — Z13.1 ENCOUNTER FOR SCREENING FOR DIABETES MELLITUS: ICD-10-CM

## 2022-05-04 ENCOUNTER — APPOINTMENT (OUTPATIENT)
Dept: FAMILY MEDICINE | Facility: CLINIC | Age: 52
End: 2022-05-04
Payer: COMMERCIAL

## 2022-05-04 VITALS
BODY MASS INDEX: 23.56 KG/M2 | SYSTOLIC BLOOD PRESSURE: 110 MMHG | TEMPERATURE: 97.3 F | HEART RATE: 62 BPM | HEIGHT: 64 IN | OXYGEN SATURATION: 97 % | WEIGHT: 138 LBS | DIASTOLIC BLOOD PRESSURE: 60 MMHG

## 2022-05-04 DIAGNOSIS — Z00.00 ENCOUNTER FOR GENERAL ADULT MEDICAL EXAMINATION W/OUT ABNORMAL FINDINGS: ICD-10-CM

## 2022-05-04 DIAGNOSIS — D75.838 OTHER THROMBOCYTOSIS: ICD-10-CM

## 2022-05-04 DIAGNOSIS — E61.1 IRON DEFICIENCY: ICD-10-CM

## 2022-05-04 DIAGNOSIS — K51.90 ULCERATIVE COLITIS, UNSPECIFIED, W/OUT COMPLICATIONS: ICD-10-CM

## 2022-05-04 DIAGNOSIS — Z63.5 DISRUPTION OF FAMILY BY SEPARATION AND DIVORCE: ICD-10-CM

## 2022-05-04 PROCEDURE — 36415 COLL VENOUS BLD VENIPUNCTURE: CPT

## 2022-05-04 PROCEDURE — 99396 PREV VISIT EST AGE 40-64: CPT | Mod: 25

## 2022-05-04 SDOH — SOCIAL STABILITY - SOCIAL INSECURITY: DISRUPTION OF FAMILY BY SEPARATION AND DIVORCE: Z63.5

## 2022-05-04 NOTE — ASSESSMENT
[FreeTextEntry1] : THIAGO LEGER is a 51 year old female here for a physical exam.  She is also here to follow up on medical issues as noted above.\par

## 2022-05-04 NOTE — PLAN
[FreeTextEntry1] : Reviewed age-appropriate preventive screening tests with patient. UTD on mammogram and gyn exams. Might be Due for f/u colonoscopy (since last showed colitis and she has had recent GI sxs c/w flare up of her UC) and she will d/w new Gi (Dr. Minor, has appt for initial eval today with her) about that and schedule if due or if not will clarify when she is next due and let me know. \par \par She deferson ECg today\par \par Revd/recommended Shingrix, Tdap (since she is not sure of date of last booster) and PCV 20 (UC) and she will consider and will d/w GI also. \par \par Check labs today. Continue same meds/doses pending lab results. \par \par LDL goal <=100 ideally. Reviewed LDL goal and ASCVD risk estimate and factors that go into this calculation.  Reviewed risks/benefits of statin med. Reviewed red yeast rice RYR (600-1200 mg daily) risks/benefits as an alternative to statin meds if not ready to consider statin meds. Recommended excellent hydration +/- co Q10 (100-400 mg daily) to decrease risk for statin (or RYR) related myalgias. \par \par Discussed clean eating (eg Mediterranean style eating plan) and regular exercise/staying as physically active as possible.  Include balance exercises and strength training and core strengthening exercises for bone health and to decrease risk for falls. \par \par Cont f/u with neuro, GI (for UC) and H/O (for thrombocytosis if needed but H/O advised her ok to do prn f/u and not regular f/u and as long as her plts are relatively stable I am comfortable with that plan) as recommended by them.\par \par Reviewed importance of good self care (eg meditation, yoga, adequate rest, regular exercise, magnesium, clean eating etc).\par \par We revd increased freq of RF of Clonazepam over past year. The current RF freq of Q1-2 mos for 30 pills indicates she is using med Q1-2 days and that is too frequent when used as only medication treatment for her anxiety. Some of this seems to be related to increase in freq of use due to life stressors impacting ability to get good sleep etc and some of this appears to be a miscommunication making it seem that she was using ore med than she was as she got a few RF in early 2022 that she did not yet need (see HPI for details). She notes that she has a good supply of med at home (will count and make a note of it when she gets home). She will safeguard supply also. \par \par Disc r/b/se of Clonazepam incl risk for addiction and also tolerance to effects of med/need for higher doses over time etc. We revd that she must start to wean back down on freq of use of Clonazepam (goal is to get back to freq of RF of Q6 mos or less often). If she feels she can do this with increased therapy (if can afford to do this) and self care measures that's fine but if she feels she needs more support/assistance with treating her anxiety than gets with therapy and self care she should strongly consider adding a daily med at least for now (early menopause, life and health stressors etc).  \par \par If needs daily med consider trial of SSRI med (LExapro, Sertraline) or Bupropion (advantage is can help with focus and is wgt neurtal) vs. Effexor (would choose this mainly if she is also having signif hot flashes) and we revd pros/cons/r/b/se of these meds. I would favor Bupropion for Jolie at this point if she needed/wanted a med; for now she does not feel she needs a daily med but will keep this in mind and let me know if anything changes or if she is not able to wean down use of Clonazepam over time (she thinks she can do so and will make the time between RF requests stretch out as long as possible) . \par \par Next CPE in 1 year. RPA visit (lipids, anxiety, etc) and fasting labs in 6 mos

## 2022-05-04 NOTE — HEALTH RISK ASSESSMENT
[0] : 2) Feeling down, depressed, or hopeless: Not at all (0) [PHQ-2 Negative - No further assessment needed] : PHQ-2 Negative - No further assessment needed [[id number]] : 0

## 2022-05-04 NOTE — REVIEW OF SYSTEMS
[Negative] : Heme/Lymph [Insomnia] : insomnia [Anxiety] : anxiety [Itching] : no itching [Mole Changes] : no mole changes [Skin Rash] : no skin rash [Suicidal] : not suicidal [Depression] : no depression [FreeTextEntry8] : she is post menopausal now [de-identified] : +solar lentigos s/p laser treatment with derm and healing now [de-identified] : +ADD, see neuro, doing well on current med, see HPI

## 2022-05-04 NOTE — HISTORY OF PRESENT ILLNESS
[de-identified] : Her last PE was last year\par \par Vaccines:\par Tetanus shot is up to date she thinks but not sure of date\par Pneumococcal vaccination is NOT up to date\par Shingrix vaccination is NOT up to date\par COVID vaccine is up to date 3 doses, might have had COVID infection in 4/2022\par Had flu vacc this season\par \par Her last dentist visit was within past 6-12 months\par Her last eye doctor appointment was within past year\par Her last dermatologist visit was within past year, Dr. Emery, s/p laser treatment for solar lentigos on face/neck recently and these areas are currently healing\par \par Her diet is healthful/clean overall\par Exercise: cardio and/or strength training most days per week\par \par Her GYN visits are up to date, 6/2021 and 10/2021 Dr. Navarro\par Her Mammogram screening is up to date, 1/2022\par Her Colorectal cancer screening is up to date, colonoscopy 6/2020, mild colitis, Dr. Roberson-- seeing Dr. Hastings ([facility]) for new eval in [facility]. Will d/w GI when next due for colonoscopy\par \par Jolie has h/o UC, ADD, anxiety, seasonal allergies and high cholesterol. \par \par She is UTD on f/u with GI (Dr Roberson) and neuro (Dr. Li) and H/O (Dr. Guillermo, can f/u with him prn going fwd) .\par \par F/u ADD: followed by Dr. Li. She has Adderall XR on hand and uses most but not all days (takes a day off when she can. Takes on avg 4x/wk/. Works well. Med is well tolerated. Daytime feeling of fatigue and fogginess is better since started Adderall. . \par \par Had been off Asacol for much of past couple of years as was feeling well off meds and preferred to avoid immunsuppressant meds. Had recurrent GI sxs (urgency, increased stool freq, etc) and saw Dr. Roberson 2/2022 and he restarted her Asacol. She felt it was time for a change in GI specialist and she has appt with Dr. Minor today for eval and if connects well with her plans to f/u with her for ongoing treatment of her UC. Worked with a nutritionist and she has found that limiting gluten intake seems to help her colitis and is hoping it helped her chol levels. Would like ESR and CRP levels checked with labs today\par \par She has persistent thrombocytosis. Saw [facility] and had eval and all was benign. Likely her thrombocytosis is due to inflammation and chronic low level blood loss from her UC. Dr. Guillermo suggested oral iron supplementation . She does not take iron supplement but eats healthfully/iron rich foods. \par \par \par Had elevated lipids 1/2022 and has been eating more cleanly since and limiting gluten and would like to see how lipids look today.\par \par She has Clonazepam on hand for prn use. She does not feel she needs a daily med for anxiety at this time and has declined trials of SSRI med offered at prior visits. Uses self care measures for stress/anxiety. She did therapy in the past and was very helpful but right now can not do this due to financial strain and does not want to start over with online/new therapist. Has had high stress in life over past year s with separation/divorce etc but feels the worst of that stress is probably behind her now\par \par Of note, when I check [place] I [place] her freq of Clonazepam RF (#30 at a time) has increased to Q1-2 months over past year whereas prior to that was filled at a freq of Q6+ mos. We revd I Stop together today and she had RF Rxs (#30 pills per fill) on 8/19/21, 10/4/21, 11/19/21, 1/20/22, 2/17/22, and 3/29/22. She notes that she has a bit of a stockpile built up as she did not need RF exactly that freq (but our office contacted her to say if she needed RF she needed a visit -- which it seems we did when we got an automatic RF request from pharmacy-- and she did telehealth visit and filled RF). WE revd r/b/se of Clonazepam and that it can be addictive and also less effective if used too often. She estimates that she has been using 1/2 pill approx 3x/wk to help with sleep during higher stress time of her life and she will work to decrease freq of use. Uses self care measures also. Does not feel she needs/wants daily med for anxiety at this time but will keep in mind as an option for the future if needed

## 2022-05-05 ENCOUNTER — NON-APPOINTMENT (OUTPATIENT)
Age: 52
End: 2022-05-05

## 2022-05-05 LAB
ALBUMIN SERPL ELPH-MCNC: 4.4 G/DL
ALP BLD-CCNC: 56 U/L
ALT SERPL-CCNC: 20 U/L
ANION GAP SERPL CALC-SCNC: 12 MMOL/L
AST SERPL-CCNC: 23 U/L
BASOPHILS # BLD AUTO: 0.07 K/UL
BASOPHILS NFR BLD AUTO: 1 %
BILIRUB SERPL-MCNC: 0.4 MG/DL
BUN SERPL-MCNC: 15 MG/DL
CALCIUM SERPL-MCNC: 9.7 MG/DL
CHLORIDE SERPL-SCNC: 102 MMOL/L
CHOLEST SERPL-MCNC: 210 MG/DL
CO2 SERPL-SCNC: 26 MMOL/L
CREAT SERPL-MCNC: 0.86 MG/DL
CRP SERPL-MCNC: <3 MG/L
EGFR: 82 ML/MIN/1.73M2
EOSINOPHIL # BLD AUTO: 0.22 K/UL
EOSINOPHIL NFR BLD AUTO: 3 %
ERYTHROCYTE [SEDIMENTATION RATE] IN BLOOD BY WESTERGREN METHOD: 26 MM/HR
FERRITIN SERPL-MCNC: 93 NG/ML
GLUCOSE SERPL-MCNC: 86 MG/DL
HCT VFR BLD CALC: 43.6 %
HDLC SERPL-MCNC: 65 MG/DL
HGB BLD-MCNC: 13.7 G/DL
IMM GRANULOCYTES NFR BLD AUTO: 0.3 %
IRON SATN MFR SERPL: 35 %
IRON SERPL-MCNC: 120 UG/DL
LDLC SERPL CALC-MCNC: 128 MG/DL
LYMPHOCYTES # BLD AUTO: 1.91 K/UL
LYMPHOCYTES NFR BLD AUTO: 26 %
MAN DIFF?: NORMAL
MCHC RBC-ENTMCNC: 29.5 PG
MCHC RBC-ENTMCNC: 31.4 GM/DL
MCV RBC AUTO: 94 FL
MONOCYTES # BLD AUTO: 0.74 K/UL
MONOCYTES NFR BLD AUTO: 10.1 %
NEUTROPHILS # BLD AUTO: 4.39 K/UL
NEUTROPHILS NFR BLD AUTO: 59.6 %
NONHDLC SERPL-MCNC: 145 MG/DL
PLATELET # BLD AUTO: 520 K/UL
POTASSIUM SERPL-SCNC: 4.5 MMOL/L
PROT SERPL-MCNC: 7.4 G/DL
RBC # BLD: 4.64 M/UL
RBC # FLD: 13.5 %
SODIUM SERPL-SCNC: 141 MMOL/L
TIBC SERPL-MCNC: 347 UG/DL
TRIGL SERPL-MCNC: 84 MG/DL
UIBC SERPL-MCNC: 227 UG/DL
WBC # FLD AUTO: 7.35 K/UL

## 2022-05-06 LAB — TSH SERPL-ACNC: 1.11 UIU/ML

## 2022-05-11 ENCOUNTER — NON-APPOINTMENT (OUTPATIENT)
Age: 52
End: 2022-05-11

## 2022-09-12 ENCOUNTER — RESULT REVIEW (OUTPATIENT)
Age: 52
End: 2022-09-12

## 2023-02-23 ENCOUNTER — APPOINTMENT (OUTPATIENT)
Dept: FAMILY MEDICINE | Facility: CLINIC | Age: 53
End: 2023-02-23
Payer: MEDICAID

## 2023-02-23 VITALS
DIASTOLIC BLOOD PRESSURE: 80 MMHG | HEIGHT: 64 IN | SYSTOLIC BLOOD PRESSURE: 120 MMHG | BODY MASS INDEX: 27.66 KG/M2 | OXYGEN SATURATION: 100 % | TEMPERATURE: 97.4 F | WEIGHT: 162 LBS | HEART RATE: 70 BPM

## 2023-02-23 DIAGNOSIS — G47.00 INSOMNIA, UNSPECIFIED: ICD-10-CM

## 2023-02-23 PROCEDURE — 99213 OFFICE O/P EST LOW 20 MIN: CPT | Mod: 25

## 2023-02-23 RX ORDER — MESALAMINE 800 MG/1
800 TABLET, DELAYED RELEASE ORAL
Refills: 0 | Status: DISCONTINUED | COMMUNITY
End: 2023-02-23

## 2023-02-23 NOTE — HISTORY OF PRESENT ILLNESS
[FreeTextEntry1] : Follow up, med refill [de-identified] : 53 yo F, PMH anxiety, ADD, Ulcerative colitis, HLD and insomnia presents for followup\par She is feeling well.\par She is concerned about high cholesterol history.  She states she eats very healthy and exercises frequently.\par She takes Klonopin about 1/2 tab 3 times a week for trouble sleeping.

## 2023-02-28 LAB
CHOLEST SERPL-MCNC: 248 MG/DL
HDLC SERPL-MCNC: 81 MG/DL
LDLC SERPL CALC-MCNC: 150 MG/DL
NONHDLC SERPL-MCNC: 167 MG/DL
TRIGL SERPL-MCNC: 88 MG/DL

## 2023-03-03 ENCOUNTER — NON-APPOINTMENT (OUTPATIENT)
Age: 53
End: 2023-03-03

## 2023-03-09 ENCOUNTER — TRANSCRIPTION ENCOUNTER (OUTPATIENT)
Age: 53
End: 2023-03-09

## 2023-04-10 ENCOUNTER — FORM ENCOUNTER (OUTPATIENT)
Age: 53
End: 2023-04-10

## 2023-04-17 ENCOUNTER — NON-APPOINTMENT (OUTPATIENT)
Age: 53
End: 2023-04-17

## 2023-04-18 ENCOUNTER — NON-APPOINTMENT (OUTPATIENT)
Age: 53
End: 2023-04-18

## 2023-05-18 ENCOUNTER — APPOINTMENT (OUTPATIENT)
Dept: FAMILY MEDICINE | Facility: CLINIC | Age: 53
End: 2023-05-18
Payer: MEDICAID

## 2023-05-18 ENCOUNTER — NON-APPOINTMENT (OUTPATIENT)
Age: 53
End: 2023-05-18

## 2023-05-18 VITALS
OXYGEN SATURATION: 97 % | SYSTOLIC BLOOD PRESSURE: 110 MMHG | DIASTOLIC BLOOD PRESSURE: 72 MMHG | HEIGHT: 64 IN | BODY MASS INDEX: 23.9 KG/M2 | WEIGHT: 140 LBS | TEMPERATURE: 97.8 F | HEART RATE: 68 BPM

## 2023-05-18 DIAGNOSIS — E78.00 PURE HYPERCHOLESTEROLEMIA, UNSPECIFIED: ICD-10-CM

## 2023-05-18 DIAGNOSIS — Z00.00 ENCOUNTER FOR GENERAL ADULT MEDICAL EXAMINATION W/OUT ABNORMAL FINDINGS: ICD-10-CM

## 2023-05-18 DIAGNOSIS — Z91.89 OTHER SPECIFIED PERSONAL RISK FACTORS, NOT ELSEWHERE CLASSIFIED: ICD-10-CM

## 2023-05-18 PROCEDURE — 93000 ELECTROCARDIOGRAM COMPLETE: CPT

## 2023-05-18 PROCEDURE — 90750 HZV VACC RECOMBINANT IM: CPT

## 2023-05-18 PROCEDURE — 99396 PREV VISIT EST AGE 40-64: CPT | Mod: 25

## 2023-05-18 PROCEDURE — 36415 COLL VENOUS BLD VENIPUNCTURE: CPT

## 2023-05-18 PROCEDURE — 90471 IMMUNIZATION ADMIN: CPT

## 2023-05-18 NOTE — ASSESSMENT
[FreeTextEntry1] : I Paola Dove am scribing for the presence of Dr. Ruiz the following sections HISTORY OF PRESENT ILLNESS, PAST MEDICAL/FAMILY/SOCIAL HISTORY; REVIEW OF SYSTEMS; VITAL SIGNS; PHYSICAL EXAM. \par \par  I personally performed the services described in the documentation, reviewed the documentation recorded by the scribe in my presence and it accurately and completely records my words and actions.\par

## 2023-05-18 NOTE — HISTORY OF PRESENT ILLNESS
[FreeTextEntry1] : CPE [de-identified] : 52 year F presents for an annual physical exam. \par Feeling well with no complaints.\par

## 2023-05-18 NOTE — HEALTH RISK ASSESSMENT
[Yes] : Yes [2 - 3 times a week (3 pts)] : 2 - 3  times a week (3 points) [1 or 2 (0 pts)] : 1 or 2 (0 points) [Never (0 pts)] : Never (0 points) [No] : In the past 12 months have you used drugs other than those required for medical reasons? No [0] : 2) Feeling down, depressed, or hopeless: Not at all (0) [PHQ-2 Negative - No further assessment needed] : PHQ-2 Negative - No further assessment needed [Never] : Never [Patient reported mammogram was normal] : Patient reported mammogram was normal [Patient reported PAP Smear was normal] : Patient reported PAP Smear was normal [Patient reported colonoscopy was normal] : Patient reported colonoscopy was normal [Fully functional (bathing, dressing, toileting, transferring, walking, feeding)] : Fully functional (bathing, dressing, toileting, transferring, walking, feeding) [Fully functional (using the telephone, shopping, preparing meals, housekeeping, doing laundry, using] : Fully functional and needs no help or supervision to perform IADLs (using the telephone, shopping, preparing meals, housekeeping, doing laundry, using transportation, managing medications and managing finances) [Excellent] : ~his/her~  mood as  excellent [de-identified] : Manoj [Audit-CScore] : 3 [de-identified] : regularly [de-identified] : healthy overall [[id number]] : 0 [Reports changes in vision] : Reports no changes in vision [Reports normal functional visual acuity (ie: able to read med bottle)] : Reports poor functional visual acuity.  [Reports changes in dental health] : Reports no changes in dental health [MammogramDate] : 03/23 [PapSmearDate] : 07/22 [PapSmearComments] : Dr. Nasir Navarro  [BoneDensityDate] : never  [ColonoscopyDate] : 06/22 [de-identified] : UTD with vision, dental, and derm

## 2023-05-19 LAB
ALBUMIN SERPL ELPH-MCNC: 4.6 G/DL
ALP BLD-CCNC: 52 U/L
ALT SERPL-CCNC: 22 U/L
ANION GAP SERPL CALC-SCNC: 12 MMOL/L
APPEARANCE: CLEAR
AST SERPL-CCNC: 34 U/L
BILIRUB SERPL-MCNC: 0.4 MG/DL
BILIRUBIN URINE: NEGATIVE
BLOOD URINE: NEGATIVE
BUN SERPL-MCNC: 16 MG/DL
CALCIUM SERPL-MCNC: 10.2 MG/DL
CHLORIDE SERPL-SCNC: 102 MMOL/L
CHOLEST SERPL-MCNC: 263 MG/DL
CO2 SERPL-SCNC: 28 MMOL/L
COLOR: YELLOW
CREAT SERPL-MCNC: 0.85 MG/DL
EGFR: 82 ML/MIN/1.73M2
FERRITIN SERPL-MCNC: 107 NG/ML
GLUCOSE QUALITATIVE U: NEGATIVE MG/DL
GLUCOSE SERPL-MCNC: 87 MG/DL
HDLC SERPL-MCNC: 86 MG/DL
KETONES URINE: NEGATIVE MG/DL
LDLC SERPL CALC-MCNC: 162 MG/DL
LEUKOCYTE ESTERASE URINE: NEGATIVE
NITRITE URINE: NEGATIVE
NONHDLC SERPL-MCNC: 177 MG/DL
PH URINE: 7
POTASSIUM SERPL-SCNC: 4.3 MMOL/L
PROT SERPL-MCNC: 7.8 G/DL
PROTEIN URINE: NEGATIVE MG/DL
SODIUM SERPL-SCNC: 143 MMOL/L
SPECIFIC GRAVITY URINE: 1.01
TRIGL SERPL-MCNC: 77 MG/DL
UROBILINOGEN URINE: 0.2 MG/DL

## 2023-05-24 ENCOUNTER — NON-APPOINTMENT (OUTPATIENT)
Age: 53
End: 2023-05-24

## 2023-06-02 ENCOUNTER — NON-APPOINTMENT (OUTPATIENT)
Age: 53
End: 2023-06-02

## 2023-07-16 ENCOUNTER — NON-APPOINTMENT (OUTPATIENT)
Age: 53
End: 2023-07-16

## 2023-07-18 ENCOUNTER — APPOINTMENT (OUTPATIENT)
Dept: FAMILY MEDICINE | Facility: CLINIC | Age: 53
End: 2023-07-18
Payer: MEDICAID

## 2023-07-18 VITALS
WEIGHT: 135 LBS | TEMPERATURE: 99.4 F | OXYGEN SATURATION: 96 % | HEIGHT: 64 IN | BODY MASS INDEX: 23.05 KG/M2 | SYSTOLIC BLOOD PRESSURE: 112 MMHG | HEART RATE: 84 BPM | DIASTOLIC BLOOD PRESSURE: 64 MMHG

## 2023-07-18 PROCEDURE — 99214 OFFICE O/P EST MOD 30 MIN: CPT

## 2023-07-18 NOTE — HISTORY OF PRESENT ILLNESS
[FreeTextEntry8] : Acute care visit \par \par Patient presents with a fever and cough. Onset of symptoms started Friday; tmax 103, fatigue, headache, loss of appetite, sinus pressure, chills, dry cough. She had gone to urgent care yesterday and diagnosed with Viral URI, advised her to take Tylenol XS [id number]. However she is still having fever.\par  Denies any sore throat, post nasal drip, SOB, rashes, tick bites.

## 2023-07-18 NOTE — REVIEW OF SYSTEMS
[Fever] : fever [Chills] : chills [Fatigue] : fatigue [Cough] : cough [Headache] : headache [Negative] : Heme/Lymph

## 2023-07-18 NOTE — HEALTH RISK ASSESSMENT
[0] : 2) Feeling down, depressed, or hopeless: Not at all (0) [PHQ-2 Negative - No further assessment needed] : PHQ-2 Negative - No further assessment needed [Never] : Never [[id number]] : 0

## 2023-07-19 ENCOUNTER — INPATIENT (INPATIENT)
Facility: HOSPITAL | Age: 53
LOS: 12 days | Discharge: HOME | DRG: 720 | End: 2023-08-01
Attending: HOSPITALIST | Admitting: INTERNAL MEDICINE
Payer: MEDICAID

## 2023-07-19 VITALS — HEIGHT: 64 IN | WEIGHT: 134.92 LBS

## 2023-07-19 DIAGNOSIS — Z98.891 HISTORY OF UTERINE SCAR FROM PREVIOUS SURGERY: Chronic | ICD-10-CM

## 2023-07-19 DIAGNOSIS — A41.9 SEPSIS, UNSPECIFIED ORGANISM: ICD-10-CM

## 2023-07-19 LAB
ALBUMIN SERPL ELPH-MCNC: 2.6 G/DL — LOW (ref 3.3–5)
ALP SERPL-CCNC: 110 U/L — SIGNIFICANT CHANGE UP (ref 40–120)
ALT FLD-CCNC: 186 U/L — HIGH (ref 12–78)
ANION GAP SERPL CALC-SCNC: 7 MMOL/L — SIGNIFICANT CHANGE UP (ref 5–17)
APPEARANCE UR: ABNORMAL
APTT BLD: 35.1 SEC — SIGNIFICANT CHANGE UP (ref 27.5–35.5)
AST SERPL-CCNC: 196 U/L — HIGH (ref 15–37)
BACTERIA # UR AUTO: ABNORMAL
BASOPHILS # BLD AUTO: 0 K/UL — SIGNIFICANT CHANGE UP (ref 0–0.2)
BASOPHILS NFR BLD AUTO: 0 % — SIGNIFICANT CHANGE UP (ref 0–2)
BILIRUB SERPL-MCNC: 1.9 MG/DL — HIGH (ref 0.2–1.2)
BILIRUB UR-MCNC: NEGATIVE — SIGNIFICANT CHANGE UP
BUN SERPL-MCNC: 23 MG/DL — SIGNIFICANT CHANGE UP (ref 7–23)
CALCIUM SERPL-MCNC: 8.5 MG/DL — SIGNIFICANT CHANGE UP (ref 8.5–10.1)
CHLORIDE SERPL-SCNC: 95 MMOL/L — LOW (ref 96–108)
CO2 SERPL-SCNC: 28 MMOL/L — SIGNIFICANT CHANGE UP (ref 22–31)
COLOR SPEC: YELLOW — SIGNIFICANT CHANGE UP
COMMENT - URINE: SIGNIFICANT CHANGE UP
CREAT SERPL-MCNC: 1.1 MG/DL — SIGNIFICANT CHANGE UP (ref 0.5–1.3)
DIFF PNL FLD: ABNORMAL
EGFR: 60 ML/MIN/1.73M2 — SIGNIFICANT CHANGE UP
EOSINOPHIL # BLD AUTO: 0 K/UL — SIGNIFICANT CHANGE UP (ref 0–0.5)
EOSINOPHIL NFR BLD AUTO: 0 % — SIGNIFICANT CHANGE UP (ref 0–6)
EPI CELLS # UR: SIGNIFICANT CHANGE UP
GLUCOSE SERPL-MCNC: 116 MG/DL — HIGH (ref 70–99)
GLUCOSE UR QL: NEGATIVE — SIGNIFICANT CHANGE UP
HCG SERPL-ACNC: 2 MIU/ML — SIGNIFICANT CHANGE UP
HCT VFR BLD CALC: 35.9 % — SIGNIFICANT CHANGE UP (ref 34.5–45)
HGB BLD-MCNC: 12.9 G/DL — SIGNIFICANT CHANGE UP (ref 11.5–15.5)
INR BLD: 1.6 RATIO — HIGH (ref 0.88–1.16)
KETONES UR-MCNC: ABNORMAL
LACTATE SERPL-SCNC: 1.5 MMOL/L — SIGNIFICANT CHANGE UP (ref 0.7–2)
LEUKOCYTE ESTERASE UR-ACNC: ABNORMAL
LYMPHOCYTES # BLD AUTO: 0.84 K/UL — LOW (ref 1–3.3)
LYMPHOCYTES # BLD AUTO: 11 % — LOW (ref 13–44)
MANUAL SMEAR VERIFICATION: SIGNIFICANT CHANGE UP
MCHC RBC-ENTMCNC: 29.8 PG — SIGNIFICANT CHANGE UP (ref 27–34)
MCHC RBC-ENTMCNC: 35.9 GM/DL — SIGNIFICANT CHANGE UP (ref 32–36)
MCV RBC AUTO: 82.9 FL — SIGNIFICANT CHANGE UP (ref 80–100)
MONOCYTES # BLD AUTO: 0.69 K/UL — SIGNIFICANT CHANGE UP (ref 0–0.9)
MONOCYTES NFR BLD AUTO: 9 % — SIGNIFICANT CHANGE UP (ref 2–14)
NEUTROPHILS # BLD AUTO: 6.11 K/UL — SIGNIFICANT CHANGE UP (ref 1.8–7.4)
NEUTROPHILS NFR BLD AUTO: 79 % — HIGH (ref 43–77)
NEUTS BAND # BLD: 1 % — SIGNIFICANT CHANGE UP (ref 0–8)
NITRITE UR-MCNC: NEGATIVE — SIGNIFICANT CHANGE UP
NRBC # BLD: 0 /100 — SIGNIFICANT CHANGE UP (ref 0–0)
NRBC # BLD: SIGNIFICANT CHANGE UP /100 WBCS (ref 0–0)
PARASITE BLD: SIGNIFICANT CHANGE UP
PH UR: 6 — SIGNIFICANT CHANGE UP (ref 5–8)
PLAT MORPH BLD: NORMAL — SIGNIFICANT CHANGE UP
PLATELET # BLD AUTO: 75 K/UL — LOW (ref 150–400)
POTASSIUM SERPL-MCNC: 3.5 MMOL/L — SIGNIFICANT CHANGE UP (ref 3.5–5.3)
POTASSIUM SERPL-SCNC: 3.5 MMOL/L — SIGNIFICANT CHANGE UP (ref 3.5–5.3)
PROT SERPL-MCNC: 7.2 GM/DL — SIGNIFICANT CHANGE UP (ref 6–8.3)
PROT UR-MCNC: 30 MG/DL
PROTHROM AB SERPL-ACNC: 18.6 SEC — HIGH (ref 10.5–13.4)
RBC # BLD: 4.33 M/UL — SIGNIFICANT CHANGE UP (ref 3.8–5.2)
RBC # FLD: 14.2 % — SIGNIFICANT CHANGE UP (ref 10.3–14.5)
RBC BLD AUTO: ABNORMAL
RBC CASTS # UR COMP ASSIST: SIGNIFICANT CHANGE UP /HPF (ref 0–4)
SODIUM SERPL-SCNC: 130 MMOL/L — LOW (ref 135–145)
SP GR SPEC: 1.01 — SIGNIFICANT CHANGE UP (ref 1.01–1.02)
TROPONIN I, HIGH SENSITIVITY RESULT: 14.17 NG/L — SIGNIFICANT CHANGE UP
UROBILINOGEN FLD QL: 4
WBC # BLD: 7.64 K/UL — SIGNIFICANT CHANGE UP (ref 3.8–10.5)
WBC # FLD AUTO: 7.64 K/UL — SIGNIFICANT CHANGE UP (ref 3.8–10.5)
WBC UR QL: SIGNIFICANT CHANGE UP /HPF (ref 0–5)

## 2023-07-19 PROCEDURE — 85014 HEMATOCRIT: CPT

## 2023-07-19 PROCEDURE — 86880 COOMBS TEST DIRECT: CPT

## 2023-07-19 PROCEDURE — 82140 ASSAY OF AMMONIA: CPT

## 2023-07-19 PROCEDURE — 86140 C-REACTIVE PROTEIN: CPT

## 2023-07-19 PROCEDURE — 83735 ASSAY OF MAGNESIUM: CPT

## 2023-07-19 PROCEDURE — 85540 WBC ALKALINE PHOSPHATASE: CPT

## 2023-07-19 PROCEDURE — 83615 LACTATE (LD) (LDH) ENZYME: CPT

## 2023-07-19 PROCEDURE — 93970 EXTREMITY STUDY: CPT

## 2023-07-19 PROCEDURE — 86618 LYME DISEASE ANTIBODY: CPT

## 2023-07-19 PROCEDURE — 82550 ASSAY OF CK (CPK): CPT

## 2023-07-19 PROCEDURE — 88313 SPECIAL STAINS GROUP 2: CPT

## 2023-07-19 PROCEDURE — 80053 COMPREHEN METABOLIC PANEL: CPT

## 2023-07-19 PROCEDURE — 82272 OCCULT BLD FECES 1-3 TESTS: CPT

## 2023-07-19 PROCEDURE — 77012 CT SCAN FOR NEEDLE BIOPSY: CPT

## 2023-07-19 PROCEDURE — 94760 N-INVAS EAR/PLS OXIMETRY 1: CPT

## 2023-07-19 PROCEDURE — 88280 CHROMOSOME KARYOTYPE STUDY: CPT

## 2023-07-19 PROCEDURE — 83540 ASSAY OF IRON: CPT

## 2023-07-19 PROCEDURE — 87086 URINE CULTURE/COLONY COUNT: CPT

## 2023-07-19 PROCEDURE — 84145 PROCALCITONIN (PCT): CPT

## 2023-07-19 PROCEDURE — 0225U NFCT DS DNA&RNA 21 SARSCOV2: CPT

## 2023-07-19 PROCEDURE — 84100 ASSAY OF PHOSPHORUS: CPT

## 2023-07-19 PROCEDURE — P9016: CPT

## 2023-07-19 PROCEDURE — 88237 TISSUE CULTURE BONE MARROW: CPT

## 2023-07-19 PROCEDURE — 83550 IRON BINDING TEST: CPT

## 2023-07-19 PROCEDURE — 71275 CT ANGIOGRAPHY CHEST: CPT

## 2023-07-19 PROCEDURE — 82746 ASSAY OF FOLIC ACID SERUM: CPT

## 2023-07-19 PROCEDURE — 85384 FIBRINOGEN ACTIVITY: CPT

## 2023-07-19 PROCEDURE — 83520 IMMUNOASSAY QUANT NOS NONAB: CPT

## 2023-07-19 PROCEDURE — 85097 BONE MARROW INTERPRETATION: CPT

## 2023-07-19 PROCEDURE — 84478 ASSAY OF TRIGLYCERIDES: CPT

## 2023-07-19 PROCEDURE — 88341 IMHCHEM/IMCYTCHM EA ADD ANTB: CPT

## 2023-07-19 PROCEDURE — 85027 COMPLETE CBC AUTOMATED: CPT

## 2023-07-19 PROCEDURE — 82306 VITAMIN D 25 HYDROXY: CPT

## 2023-07-19 PROCEDURE — 99223 1ST HOSP IP/OBS HIGH 75: CPT

## 2023-07-19 PROCEDURE — 85610 PROTHROMBIN TIME: CPT

## 2023-07-19 PROCEDURE — 86738 MYCOPLASMA ANTIBODY: CPT

## 2023-07-19 PROCEDURE — 87207 SMEAR SPECIAL STAIN: CPT

## 2023-07-19 PROCEDURE — 87635 SARS-COV-2 COVID-19 AMP PRB: CPT

## 2023-07-19 PROCEDURE — 99291 CRITICAL CARE FIRST HOUR: CPT

## 2023-07-19 PROCEDURE — 82607 VITAMIN B-12: CPT

## 2023-07-19 PROCEDURE — 80048 BASIC METABOLIC PNL TOTAL CA: CPT

## 2023-07-19 PROCEDURE — 36415 COLL VENOUS BLD VENIPUNCTURE: CPT

## 2023-07-19 PROCEDURE — 84550 ASSAY OF BLOOD/URIC ACID: CPT

## 2023-07-19 PROCEDURE — 88184 FLOWCYTOMETRY/ TC 1 MARKER: CPT

## 2023-07-19 PROCEDURE — 88342 IMHCHEM/IMCYTCHM 1ST ANTB: CPT

## 2023-07-19 PROCEDURE — 85045 AUTOMATED RETICULOCYTE COUNT: CPT

## 2023-07-19 PROCEDURE — 38222 DX BONE MARROW BX & ASPIR: CPT | Mod: RT

## 2023-07-19 PROCEDURE — 81001 URINALYSIS AUTO W/SCOPE: CPT

## 2023-07-19 PROCEDURE — 88185 FLOWCYTOMETRY/TC ADD-ON: CPT

## 2023-07-19 PROCEDURE — 82728 ASSAY OF FERRITIN: CPT

## 2023-07-19 PROCEDURE — 80074 ACUTE HEPATITIS PANEL: CPT

## 2023-07-19 PROCEDURE — 84443 ASSAY THYROID STIM HORMONE: CPT

## 2023-07-19 PROCEDURE — 85730 THROMBOPLASTIN TIME PARTIAL: CPT

## 2023-07-19 PROCEDURE — 71045 X-RAY EXAM CHEST 1 VIEW: CPT | Mod: 26

## 2023-07-19 PROCEDURE — 82247 BILIRUBIN TOTAL: CPT

## 2023-07-19 PROCEDURE — 86713 LEGIONELLA ANTIBODY: CPT

## 2023-07-19 PROCEDURE — 82803 BLOOD GASES ANY COMBINATION: CPT

## 2023-07-19 PROCEDURE — 87484 EHRLICHA CHAFFEENSIS AMP PRB: CPT

## 2023-07-19 PROCEDURE — 87468 ANAPLSMA PHGCYTOPHLM AMP PRB: CPT

## 2023-07-19 PROCEDURE — A9579: CPT

## 2023-07-19 PROCEDURE — 87205 SMEAR GRAM STAIN: CPT

## 2023-07-19 PROCEDURE — 86901 BLOOD TYPING SEROLOGIC RH(D): CPT

## 2023-07-19 PROCEDURE — 71250 CT THORAX DX C-: CPT

## 2023-07-19 PROCEDURE — 88305 TISSUE EXAM BY PATHOLOGIST: CPT

## 2023-07-19 PROCEDURE — C1830: CPT

## 2023-07-19 PROCEDURE — 76705 ECHO EXAM OF ABDOMEN: CPT | Mod: 26

## 2023-07-19 PROCEDURE — 82248 BILIRUBIN DIRECT: CPT

## 2023-07-19 PROCEDURE — 86850 RBC ANTIBODY SCREEN: CPT

## 2023-07-19 PROCEDURE — 94640 AIRWAY INHALATION TREATMENT: CPT

## 2023-07-19 PROCEDURE — 86920 COMPATIBILITY TEST SPIN: CPT

## 2023-07-19 PROCEDURE — 74177 CT ABD & PELVIS W/CONTRAST: CPT | Mod: 26,MA

## 2023-07-19 PROCEDURE — 85652 RBC SED RATE AUTOMATED: CPT

## 2023-07-19 PROCEDURE — 93306 TTE W/DOPPLER COMPLETE: CPT

## 2023-07-19 PROCEDURE — 74183 MRI ABD W/O CNTR FLWD CNTR: CPT

## 2023-07-19 PROCEDURE — 86923 COMPATIBILITY TEST ELECTRIC: CPT

## 2023-07-19 PROCEDURE — 36600 WITHDRAWAL OF ARTERIAL BLOOD: CPT

## 2023-07-19 PROCEDURE — 85379 FIBRIN DEGRADATION QUANT: CPT

## 2023-07-19 PROCEDURE — 93010 ELECTROCARDIOGRAM REPORT: CPT

## 2023-07-19 PROCEDURE — 36430 TRANSFUSION BLD/BLD COMPNT: CPT

## 2023-07-19 PROCEDURE — 86900 BLOOD TYPING SEROLOGIC ABO: CPT

## 2023-07-19 PROCEDURE — 87798 DETECT AGENT NOS DNA AMP: CPT

## 2023-07-19 PROCEDURE — 88264 CHROMOSOME ANALYSIS 20-25: CPT

## 2023-07-19 PROCEDURE — 71045 X-RAY EXAM CHEST 1 VIEW: CPT

## 2023-07-19 PROCEDURE — 85018 HEMOGLOBIN: CPT

## 2023-07-19 PROCEDURE — 83010 ASSAY OF HAPTOGLOBIN QUANT: CPT

## 2023-07-19 PROCEDURE — 82955 ASSAY OF G6PD ENZYME: CPT

## 2023-07-19 PROCEDURE — 83880 ASSAY OF NATRIURETIC PEPTIDE: CPT

## 2023-07-19 PROCEDURE — 85025 COMPLETE CBC W/AUTO DIFF WBC: CPT

## 2023-07-19 PROCEDURE — 93005 ELECTROCARDIOGRAM TRACING: CPT

## 2023-07-19 RX ORDER — OMEGA-3 ACID ETHYL ESTERS 1 G
1 CAPSULE ORAL
Refills: 0 | DISCHARGE

## 2023-07-19 RX ORDER — SODIUM CHLORIDE 9 MG/ML
1000 INJECTION INTRAMUSCULAR; INTRAVENOUS; SUBCUTANEOUS ONCE
Refills: 0 | Status: COMPLETED | OUTPATIENT
Start: 2023-07-19 | End: 2023-07-19

## 2023-07-19 RX ORDER — L.ACIDOPH/B.ANIMALIS/B.LONGUM 15B CELL
1 CAPSULE ORAL
Refills: 0 | DISCHARGE

## 2023-07-19 RX ORDER — MAGNESIUM OXIDE 400 MG ORAL TABLET 241.3 MG
1 TABLET ORAL
Refills: 0 | DISCHARGE

## 2023-07-19 RX ORDER — CEFTRIAXONE 500 MG/1
1000 INJECTION, POWDER, FOR SOLUTION INTRAMUSCULAR; INTRAVENOUS ONCE
Refills: 0 | Status: COMPLETED | OUTPATIENT
Start: 2023-07-19 | End: 2023-07-19

## 2023-07-19 RX ORDER — CEFTRIAXONE 500 MG/1
1000 INJECTION, POWDER, FOR SOLUTION INTRAMUSCULAR; INTRAVENOUS ONCE
Refills: 0 | Status: DISCONTINUED | OUTPATIENT
Start: 2023-07-19 | End: 2023-07-19

## 2023-07-19 RX ORDER — KETOROLAC TROMETHAMINE 30 MG/ML
30 SYRINGE (ML) INJECTION ONCE
Refills: 0 | Status: DISCONTINUED | OUTPATIENT
Start: 2023-07-19 | End: 2023-07-19

## 2023-07-19 RX ORDER — CLONAZEPAM 1 MG
0.5 TABLET ORAL AT BEDTIME
Refills: 0 | Status: DISCONTINUED | OUTPATIENT
Start: 2023-07-19 | End: 2023-07-25

## 2023-07-19 RX ORDER — DEXTROAMPHETAMINE SACCHARATE, AMPHETAMINE ASPARTATE, DEXTROAMPHETAMINE SULFATE AND AMPHETAMINE SULFATE 1.875; 1.875; 1.875; 1.875 MG/1; MG/1; MG/1; MG/1
1 TABLET ORAL
Refills: 0 | DISCHARGE

## 2023-07-19 RX ORDER — ATOVAQUONE 750 MG/5ML
750 SUSPENSION ORAL
Refills: 0 | Status: DISCONTINUED | OUTPATIENT
Start: 2023-07-19 | End: 2023-07-30

## 2023-07-19 RX ORDER — CLONAZEPAM 1 MG
1 TABLET ORAL
Refills: 0 | DISCHARGE

## 2023-07-19 RX ORDER — MAGNESIUM OXIDE 400 MG ORAL TABLET 241.3 MG
400 TABLET ORAL AT BEDTIME
Refills: 0 | Status: DISCONTINUED | OUTPATIENT
Start: 2023-07-19 | End: 2023-08-01

## 2023-07-19 RX ORDER — ACETAMINOPHEN 500 MG
650 TABLET ORAL EVERY 6 HOURS
Refills: 0 | Status: DISCONTINUED | OUTPATIENT
Start: 2023-07-19 | End: 2023-08-01

## 2023-07-19 RX ORDER — AZITHROMYCIN 500 MG/1
500 TABLET, FILM COATED ORAL EVERY 24 HOURS
Refills: 0 | Status: DISCONTINUED | OUTPATIENT
Start: 2023-07-20 | End: 2023-07-30

## 2023-07-19 RX ORDER — PREGABALIN 225 MG/1
1000 CAPSULE ORAL DAILY
Refills: 0 | Status: DISCONTINUED | OUTPATIENT
Start: 2023-07-19 | End: 2023-08-01

## 2023-07-19 RX ORDER — SODIUM CHLORIDE 9 MG/ML
1900 INJECTION INTRAMUSCULAR; INTRAVENOUS; SUBCUTANEOUS ONCE
Refills: 0 | Status: COMPLETED | OUTPATIENT
Start: 2023-07-19 | End: 2023-07-19

## 2023-07-19 RX ORDER — ONDANSETRON 8 MG/1
4 TABLET, FILM COATED ORAL EVERY 8 HOURS
Refills: 0 | Status: DISCONTINUED | OUTPATIENT
Start: 2023-07-19 | End: 2023-08-01

## 2023-07-19 RX ORDER — LANOLIN ALCOHOL/MO/W.PET/CERES
3 CREAM (GRAM) TOPICAL AT BEDTIME
Refills: 0 | Status: DISCONTINUED | OUTPATIENT
Start: 2023-07-19 | End: 2023-08-01

## 2023-07-19 RX ORDER — PREGABALIN 225 MG/1
1 CAPSULE ORAL
Refills: 0 | DISCHARGE

## 2023-07-19 RX ORDER — ACETAMINOPHEN 500 MG
650 TABLET ORAL ONCE
Refills: 0 | Status: COMPLETED | OUTPATIENT
Start: 2023-07-19 | End: 2023-07-19

## 2023-07-19 RX ORDER — AZITHROMYCIN 500 MG/1
500 TABLET, FILM COATED ORAL ONCE
Refills: 0 | Status: COMPLETED | OUTPATIENT
Start: 2023-07-19 | End: 2023-07-19

## 2023-07-19 RX ADMIN — Medication 650 MILLIGRAM(S): at 19:08

## 2023-07-19 RX ADMIN — SODIUM CHLORIDE 1000 MILLILITER(S): 9 INJECTION INTRAMUSCULAR; INTRAVENOUS; SUBCUTANEOUS at 18:00

## 2023-07-19 RX ADMIN — SODIUM CHLORIDE 1900 MILLILITER(S): 9 INJECTION INTRAMUSCULAR; INTRAVENOUS; SUBCUTANEOUS at 14:15

## 2023-07-19 RX ADMIN — AZITHROMYCIN 255 MILLIGRAM(S): 500 TABLET, FILM COATED ORAL at 16:06

## 2023-07-19 RX ADMIN — Medication 30 MILLIGRAM(S): at 19:08

## 2023-07-19 RX ADMIN — CEFTRIAXONE 1000 MILLIGRAM(S): 500 INJECTION, POWDER, FOR SOLUTION INTRAMUSCULAR; INTRAVENOUS at 15:38

## 2023-07-19 NOTE — ED PROVIDER NOTE - CLINICAL SUMMARY MEDICAL DECISION MAKING FREE TEXT BOX
51 y/o female with a PMHx of ulcerative colitis presents to the ED c/o fever, RUQ pain, nausea and cough x5 days. Concern for sepsis. Will r/o PNA vs UTI vs colitis. Plan: labs, fluids, abx, reassess. 53 y/o female with a PMHx of ulcerative colitis presents to the ED c/o fever, RUQ pain, nausea and cough x5 days. Concern for sepsis. Will r/o PNA vs UTI vs colitis vs diverticulitis vs cholecystitis. . Plan: labs, fluids, abx, imaging reassess.

## 2023-07-19 NOTE — CONSULT NOTE ADULT - ASSESSMENT
52 year old female with PMH ulcerative colitis presents to the ED c/o fatigue (described as exhaustion), myalgias, fever, RUQ pain, nausea and dry cough, poor PO intake since Friday.     Peripheral blood smear + for parasites. Admit with sepsis, hypotension, dehydration.      Pt s/p 2L IVF. MAP >65, not tachycardic.  no need for pressors nor ICU admission at this time  recommend continuing aggressive IVF hydration in this otherwise healthy patient  antipyretics and supportive care PRN  remainder of plan per primary team  above d/w Dr Walton

## 2023-07-19 NOTE — H&P ADULT - HISTORY OF PRESENT ILLNESS
52 year old female PMHx significant for ulcerative colitispresents to the [facility] for further evaluation and management of symptoms of progressively worsening generalized weakness, malaise/fatigue, myalgias, subjective fevers (TMax at home 103.7'F), generalized abdominal discofort, and decreased PO intake which began on Friday (7/14).  The patient states that her symptoms progressed despite taking Tylenol and Motrin prn. Of note the patient reportedly tested negative for Influenza and COVID-19. Upon triage the patient met sepsis criteria => Vital signs in triage => BP 86/53, HR 70/min, RR 27/min, SpO2 97% on room air. In the ED CCM was consulted to further evaluate.     Labs => PLT 75, Peripheral smear notable for intra-erythrocytic ring form parasites, INR 1.6, Alb 2.6, TBili 1.9, AST//186, CT ABD/Pelvis w/ IV Contrast => No CT evidence for gallstones. Apparent nonspecific trace pericholecystic fluid. If clinically indicated, gallbladder ultrasound may be pursued for further evaluation. Indeterminate 1.4 cm hypodense lesion in the right hepatic lobe. If clinically indicated, nonemergent abdominal MR without and with IV contrast may be pursued for further evaluation.  US Abdomen => Nonspecific, mild, focal anterior gallbladder wall thickening/edema located between the liver and gallbladder.  Remainder of the gallbladder appears within normal limits. No gallstones are seen. Negative sonographic Almeida sign. No intra or extrahepatic biliary ductal dilatation. Finding is nonspecific.  Question hepatitis or other secondary cause for mild gallbladder wall thickening. Recommend clinical correlation.  Short interval repeat exam or MRI/MRCP can be obtained for additional evaluation as clinically indicated. There is a 1.5 x 1.3 x 1.3 cm echogenic lesion in the right hepatic lobe, presumably a hepatic hemangioma, however not characterized on the current exam. If clinically indicated, abdominal MR without and with IV contrast may be pursued for further evaluation, when clinically feasible.    In the ED the patient received Acetaminophen 650mg PO x 1, Ceftriaxone 1g IVPB x 1, Azithromycin 500mg IVPB x 1, Ketorolac 30mg IVP x 1, and NS x 1.9L.    52 year old female PMHx significant for ulcerative colitis presents to the [facility] for further evaluation and management of symptoms of progressively worsening generalized weakness, malaise/fatigue, myalgias, subjective fevers (TMax at home 103.7'F), generalized abdominal discomfort and decreased PO intake which began on Friday (7/14).  The patient states that her symptoms progressed despite taking Tylenol and Motrin prn. Of note the patient reportedly tested negative for Influenza and COVID-19. Upon triage the patient met sepsis criteria => Vital signs in triage => BP 86/53, HR 70/min, RR 27/min, SpO2 97% on room air. In the ED CCM was consulted to further evaluate.     Labs => PLT 75, Peripheral smear notable for intra-erythrocytic ring form parasites, INR 1.6, Alb 2.6, TBili 1.9, AST//186, CT ABD/Pelvis w/ IV Contrast => No CT evidence for gallstones. Apparent nonspecific trace pericholecystic fluid. If clinically indicated, gallbladder ultrasound may be pursued for further evaluation. Indeterminate 1.4 cm hypodense lesion in the right hepatic lobe. If clinically indicated, nonemergent abdominal MR without and with IV contrast may be pursued for further evaluation.  US Abdomen => Nonspecific, mild, focal anterior gallbladder wall thickening/edema located between the liver and gallbladder.  Remainder of the gallbladder appears within normal limits. No gallstones are seen. Negative sonographic Almeida sign. No intra or extrahepatic biliary ductal dilatation. Finding is nonspecific.  Question hepatitis or other secondary cause for mild gallbladder wall thickening. Recommend clinical correlation.  Short interval repeat exam or MRI/MRCP can be obtained for additional evaluation as clinically indicated. There is a 1.5 x 1.3 x 1.3 cm echogenic lesion in the right hepatic lobe, presumably a hepatic hemangioma, however not characterized on the current exam. If clinically indicated, abdominal MR without and with IV contrast may be pursued for further evaluation, when clinically feasible.    In the ED the patient received Acetaminophen 650mg PO x 1, Ceftriaxone 1g IVPB x 1, Azithromycin 500mg IVPB x 1, Ketorolac 30mg IVP x 1, and NS x 1.9L.

## 2023-07-19 NOTE — ED ADULT TRIAGE NOTE - CHIEF COMPLAINT QUOTE
Pt presented to the ER with c/o fevers since Friday. Pt c/o abdominal pain and cough. Pt tested negative for COVID and the Flu. Last dose of Tylenol was 2 hours PTA. Tmax of 103.7 at home.

## 2023-07-19 NOTE — H&P ADULT - ASSESSMENT
52 year old female PMHx significant for ulcerative [facility] management of symptoms of progressively worsening generalized weakness, malaise/fatigue, myalgias, subjective fevers (TMax at home 103.7'F), generalized abdominal discofort, and decreased PO intake which began on Friday (7/14).  The patient states that her symptoms progressed despite taking Tylenol and Motrin prn. Of note the patient reportedly tested negative for Influenza and COVID-19. Upon triage the patient met sepsis criteria => Vital signs in triage => BP 86/53, HR 70/min, RR 27/min, SpO2 97% on room air. In the ED CCM was consulted to further evaluate.      52 year old female PMHx significant for ulcerative colitis presents to the [facility] for further evaluation and management of symptoms of progressively worsening generalized weakness, malaise/fatigue, myalgias, subjective fevers (TMax at home 103.7'F), generalized abdominal discomfort and decreased PO intake which began on Friday (7/14).  The patient states that her symptoms progressed despite taking Tylenol and Motrin prn. Of note the patient reportedly tested negative for Influenza and COVID-19. Upon triage the patient met sepsis criteria => Vital signs in triage => BP 86/53, HR 70/min, RR 27/min, SpO2 97% on room air. In the ED CCM was consulted to further evaluate.     #Sepsis due to Acute Infectious Babesiosis  ~admit to Medicine  ~CCM consultation greatly appreciated  ~cont. IV hydration  ~strict I/Os  ~f/u PAN C+S  ~f/u viral Hepatitis profile  ~f/u Lyme Vise IgG/IgM AB Reflex  ~f/u Babesia microti - PCR  ~f/u Enrlichea/Anaplasma - PCR  ~cont. Atovaquone 750mg po q12h  ~cont. Azithromycin 500mg IVPB daily  ~f/u w/ ID consultation in the am  ~f/u am labs  ~cont. anti-pyretics prn    #Anxiety  ~cont. Clonazepam 0.5mg po qhs prn    #ADHD  ~will hold Dextroamphetamine-Amphetamine (takes 10mg po daily)  ~takes Dextroamphetamine-Amphetamine ER 20mg daily prn    #Vte ppx  ~cont. SCDs for now

## 2023-07-19 NOTE — ED ADULT NURSE NOTE - NSFALLUNIVINTERV_ED_ALL_ED
Bed/Stretcher in lowest position, wheels locked, appropriate side rails in place/Call bell, personal items and telephone in reach/Instruct patient to call for assistance before getting out of bed/chair/stretcher/Non-slip footwear applied when patient is off stretcher/[place] to call system/Physically safe environment - no spills, clutter or unnecessary equipment/Purposeful proactive rounding/Room/bathroom lighting operational, light cord in reach

## 2023-07-19 NOTE — ED PROVIDER NOTE - OBJECTIVE STATEMENT
51 y/o female with a PMHx of ulcerative colitis presents to the ED c/o fever, RUQ pain, nausea and cough x5 days. Pt tested negative for flu and COVID. Denies diarrhea, constipation. No other complaints at this time. 53 y/o female with a PMHx of ulcerative colitis presents to the ED c/o fever, RUQ pain, nausea and cough x5 days. Pt tested negative for flu and COVID. took tylenol at home, t max 103 F at home. Denies diarrhea, constipation, numbness or weakness of ext. No other complaints at this time.

## 2023-07-19 NOTE — ED ADULT NURSE NOTE - OBJECTIVE STATEMENT
Pt comes to the ED complaining of fever and general malaise since Friday. Pt states that she has been taking tylenol and motrin for fever with relief but fever returns. Pt complains of nausea. pt states that she has not had much to eat but she has been able to drink.

## 2023-07-19 NOTE — H&P ADULT - NSHPPHYSICALEXAM_GEN_ALL_CORE
Vital Signs Last 24 Hrs  T(C): 36.8 (19 Jul 2023 22:30), Max: 39.5 (19 Jul 2023 19:00)  T(F): 98.3 (19 Jul 2023 22:30), Max: 103.1 (19 Jul 2023 19:00)  HR: 76 (19 Jul 2023 22:30) (69 - 85)  BP: 99/52 (19 Jul 2023 22:30) (78/51 - 108/86)  BP(mean): 59 (19 Jul 2023 22:30) (59 - 89)  RR: 18 (19 Jul 2023 22:30) (18 - 27)  SpO2: 96% (19 Jul 2023 22:30) (96% - 97%)    Parameters below as of 19 Jul 2023 22:30  Patient On (Oxygen Delivery Method): room air

## 2023-07-19 NOTE — CONSULT NOTE ADULT - SUBJECTIVE AND OBJECTIVE BOX
53 y/o female with PMH ulcerative colitis presents to the ED c/o fatigue (described as exhaustion), myalgias, fever, RUQ pain, nausea and dry cough, poor PO intake since Friday. Pt ED tested negative for flu and COVID. Denies sore throat, runny nose, diarrhea, constipation.       PMH/PSH:  Ulcerative colitis  H/O: , x3    FAMILY HISTORY:    SOCIAL HISTORY:  Smoker:   ETOH use:   Ilic[employer] Drug use:     Occupation:  Lives with:  Assist device use:    Code Status:  HCP:      MEDICATIONS  (STANDING):  sodium chloride 0.9% Bolus 1000 milliLiter(s) IV Bolus once    No Known Allergies                                                            LABS:                        12.9   7.64  )-----------( 75       ( 2023 12:41 )             35.9     -    130<L>  |  95<L>  |  23  ----------------------------<  116<H>  3.5   |  28  |  1.10    Ca    8.5      2023 12:41    TPro  7.2  /  Alb  2.6<L>  /  TBili  1.9<H>  /  DBili  x   /  AST  196<H>  /  ALT  186<H>  /  AlkPhos  110  -    PT/INR - ( 2023 12:41 )   PT: 18.6 sec;   INR: 1.60 ratio      PTT - ( 2023 12:41 )  PTT:35.1 sec    Urinalysis Basic - ( 2023 14:45 )  Color: Yellow / Appearance: Slightly Turbid / S.010 / pH: x  Gluc: x / Ketone: Trace  / Bili: Negative / Urobili: 4   Blood: x / Protein: 30 mg/dL / Nitrite: Negative   Leuk Esterase: Trace / RBC: 0-2 /HPF / WBC 0-2 /HPF   Sq Epi: x / Non Sq Epi: x / Bacteria: Many    Lactate, Blood (23 @ 15:38)    Lactate, Blood: 1.5 mmol/L    Per report from Dr Walton: peripheral blood smear + for parasites      < from: CT Abdomen and Pelvis w/ IV Cont (23 @ 14:54) >  FINDINGS:  LOWER CHEST: Small bilateral atelectasis.  LIVER: Indeterminate 1.4 cm hypodense lesion in the right hepatic lobe. If clinically indicated, nonemergent abdominal MR without and with IV contrast may be pursued for further evaluation.  BILE DUCTS: Normal caliber.  GALLBLADDER: No CT evidence for gallstones. Apparent nonspecific trace pericholecystic fluid. If clinically indicated, gallbladder ultrasound may be pursued for further evaluation.  SPLEEN: Within normal limits.  PANCREAS: Within normal limits.  ADRENALS: Within normal limits.  KIDNEYS/URETERS: Within normal limits except for a small hypodense lesion in the left kidney, too small to characterize.  BLADDER: Within normal limits.  REPRODUCTIVE ORGANS: The uterus and adnexa appear grossly unremarkable.  BOWEL: No bowel obstruction or grossly thickened bowel wall. Appendix within normal limits.  PERITONEUM: No free air or ascites.  VESSELS: Within normal limits.  RETROPERITONEUM/LYMPH NODES: No lymphadenopathy.  ABDOMINAL WALL: Tiny fat-containing umbilical hernia.  BONES: Mild degenerative spondylosis. Grade 1 anterolisthesis at L4-5.  IMPRESSION:  No CT evidence for gallstones. Apparent nonspecific trace pericholecystic fluid. If clinically indicated, gallbladder ultrasoundmay be pursued for further evaluation.  Indeterminate 1.4 cm hypodense lesion in the right hepatic lobe. If clinically indicated, nonemergent abdominal MR without and with IV contrast may be pursued for further evaluation.  --- End of Report ---  < end of copied text >    < from: US Abdomen Upper Quadrant Right (23 @ 15:59) >  FINDINGS:  Liver: Within normal limits in echogenicity and size. There is a 1.5 x 1.3 x 1.3 cm echogenic lesion in the right hepatic lobe, presumably a hepatic hemangioma, however not characterized on the current exam. The visualized main portal vein appears patent with normal direction of flow.  Bile ducts: Normal caliber. Common bile duct measures 2 mm.  Gallbladder: No gallstones are seen within the gallbladder.  Negative sonographic Almeida's sign. There is mild, focal anterior gallbladder wall thickening/edema, up to 4.5 mm,  located between the gallbladder and the liver, as was suggested on recent prior CT. The remainder of the gallbladder wall appears within normal limits.  Pancreas: Visualized portions are within normal limits.  Right kidney: 11.6 cm. No hydronephrosis.  Ascites: None.  Aorta/IVC: Visualized portions are within normal limits.  IMPRESSION:  Nonspecific, mild, focal anterior gallbladder wall thickening/edema located between the liver and gallbladder.  Remainder of the gallbladder appears within normal limits. No gallstones are seen. Negative sonographic Almeida sign. No intra or extrahepatic biliary ductal dilatation. Finding is nonspecific.  Question hepatitis or other secondary cause for mild gallbladder wall thickening. Recommend clinical correlation.  Short interval repeat exam or MRI/MRCP can be obtained for additional evaluation as clinically indicated.  There is a 1.5 x 1.3 x 1.3 cm echogenic lesion in the right hepatic lobe, presumably a hepatic hemangioma, however not characterized on the current exam. If clinically indicated, abdominal MR without and with IV contrast may be pursued for further evaluation, when clinically feasible.  --- End of Report ---  < end of copied text >      T(C): 37 (23 @ 16:15), Max: 37 (23 @ 16:15)  HR: 77 (23 @ 16:15) (69 - 85), NSR  BP: 94/73 (23 @ 16:15) (78/51 - 108/86)  RR: 26 (23 @ 16:15) (21 - 27)  SpO2: 96% (23 @ 16:15) (96% - 97%)      Physical Exam  General: WD, WN, uncomfortable looking but NAD                                                         Neuro: A+O x 3, non-focal, speech clear and intact                     Eyes: PERRL, EOMI   ENT: dry mouth  Neck: supple, no JVD, trachea midline   Chest: CTA, good air entry, equal bilaterally, no wheezes/rales/rhonchi, normal excursion, no accessory muscle use noted  CV: regular rate, regular rhythm, +S1S2  GI: soft, sore but not tender, ND, +BS  Extremities: LEGER x 4, no C/C/E, distal motor/neuro/circ intact  SKIN: warm, dry, intact  53 y/o female with PMH ulcerative colitis presents to the ED c/o fatigue (described as exhaustion), myalgias, fever, RUQ pain, nausea and dry cough, poor PO intake since Friday. Pt ED tested negative for flu and COVID. Denies sore throat, runny nose, diarrhea, constipation.       PMH/PSH:  Ulcerative colitis  H/O: , x3    FAMILY HISTORY:    SOCIAL HISTORY:  Smoker: denies/never  ETOH use: socially  Ilicit Drug use: denies    Occupation:   Lives with: 3 kids  Assist device use: none    Code Status: full code  HCP:      MEDICATIONS  (STANDING):  sodium chloride 0.9% Bolus 1000 milliLiter(s) IV Bolus once    No Known Allergies                                                            LABS:                        12.9   7.64  )-----------( 75       ( 2023 12:41 )             35.9         130<L>  |  95<L>  |  23  ----------------------------<  116<H>  3.5   |  28  |  1.10    Ca    8.5      2023 12:41    TPro  7.2  /  Alb  2.6<L>  /  TBili  1.9<H>  /  DBili  x   /  AST  196<H>  /  ALT  186<H>  /  AlkPhos  110  -    PT/INR - ( 2023 12:41 )   PT: 18.6 sec;   INR: 1.60 ratio      PTT - ( 2023 12:41 )  PTT:35.1 sec    Urinalysis Basic - ( 2023 14:45 )  Color: Yellow / Appearance: Slightly Turbid / S.010 / pH: x  Gluc: x / Ketone: Trace  / Bili: Negative / Urobili: 4   Blood: x / Protein: 30 mg/dL / Nitrite: Negative   Leuk Esterase: Trace / RBC: 0-2 /HPF / WBC 0-2 /HPF   Sq Epi: x / Non Sq Epi: x / Bacteria: Many    Lactate, Blood (23 @ 15:38)    Lactate, Blood: 1.5 mmol/L    Per report from Dr Walton: peripheral blood smear + for parasites      < from: CT Abdomen and Pelvis w/ IV Cont (07.19.23 @ 14:54) >  FINDINGS:  LOWER CHEST: Small bilateral atelectasis.  LIVER: Indeterminate 1.4 cm hypodense lesion in the right hepatic lobe. If clinically indicated, nonemergent abdominal MR without and with IV contrast may be pursued for further evaluation.  BILE DUCTS: Normal caliber.  GALLBLADDER: No CT evidence for gallstones. Apparent nonspecific trace pericholecystic fluid. If clinically indicated, gallbladder ultrasound may be pursued for further evaluation.  SPLEEN: Within normal limits.  PANCREAS: Within normal limits.  ADRENALS: Within normal limits.  KIDNEYS/URETERS: Within normal limits except for a small hypodense lesion in the left kidney, too small to characterize.  BLADDER: Within normal limits.  REPRODUCTIVE ORGANS: The uterus and adnexa appear grossly unremarkable.  BOWEL: No bowel obstruction or grossly thickened bowel wall. Appendix within normal limits.  PERITONEUM: No free air or ascites.  VESSELS: Within normal limits.  RETROPERITONEUM/LYMPH NODES: No lymphadenopathy.  ABDOMINAL WALL: Tiny fat-containing umbilical hernia.  BONES: Mild degenerative spondylosis. Grade 1 anterolisthesis at L4-5.  IMPRESSION:  No CT evidence for gallstones. Apparent nonspecific trace pericholecystic fluid. If clinically indicated, gallbladder ultrasoundmay be pursued for further evaluation.  Indeterminate 1.4 cm hypodense lesion in the right hepatic lobe. If clinically indicated, nonemergent abdominal MR without and with IV contrast may be pursued for further evaluation.  --- End of Report ---  < end of copied text >    < from: US Abdomen Upper Quadrant Right (23 @ 15:59) >  FINDINGS:  Liver: Within normal limits in echogenicity and size. There is a 1.5 x 1.3 x 1.3 cm echogenic lesion in the right hepatic lobe, presumably a hepatic hemangioma, however not characterized on the current exam. The visualized main portal vein appears patent with normal direction of flow.  Bile ducts: Normal caliber. Common bile duct measures 2 mm.  Gallbladder: No gallstones are seen within the gallbladder.  Negative sonographic Almeida's sign. There is mild, focal anterior gallbladder wall thickening/edema, up to 4.5 mm,  located between the gallbladder and the liver, as was suggested on recent prior CT. The remainder of the gallbladder wall appears within normal limits.  Pancreas: Visualized portions are within normal limits.  Right kidney: 11.6 cm. No hydronephrosis.  Ascites: None.  Aorta/IVC: Visualized portions are within normal limits.  IMPRESSION:  Nonspecific, mild, focal anterior gallbladder wall thickening/edema located between the liver and gallbladder.  Remainder of the gallbladder appears within normal limits. No gallstones are seen. Negative sonographic Almeida sign. No intra or extrahepatic biliary ductal dilatation. Finding is nonspecific.  Question hepatitis or other secondary cause for mild gallbladder wall thickening. Recommend clinical correlation.  Short interval repeat exam or MRI/MRCP can be obtained for additional evaluation as clinically indicated.  There is a 1.5 x 1.3 x 1.3 cm echogenic lesion in the right hepatic lobe, presumably a hepatic hemangioma, however not characterized on the current exam. If clinically indicated, abdominal MR without and with IV contrast may be pursued for further evaluation, when clinically feasible.  --- End of Report ---  < end of copied text >      T(C): 37 (23 @ 16:15), Max: 37 (23 @ 16:15)  HR: 77 (23 @ 16:15) (69 - 85), NSR  BP: 94/73 (23 @ 16:15) (78/51 - 108/86)  RR: 26 (23 @ 16:15) (21 - 27)  SpO2: 96% (23 @ 16:15) (96% - 97%)      Physical Exam  General: WD, WN, uncomfortable looking but NAD                                                         Neuro: A+O x 3, non-focal, speech clear and intact                     Eyes: PERRL, EOMI   ENT: dry mouth  Neck: supple, no JVD, trachea midline   Chest: CTA, good air entry, equal bilaterally, no wheezes/rales/rhonchi, normal excursion, no accessory muscle use noted  CV: regular rate, regular rhythm, +S1S2  GI: soft, sore but not tender, ND, +BS  Extremities: LEGER x 4, no C/C/E, distal motor/neuro/circ intact  SKIN: warm, dry, intact  51 y/o female with PMH ulcerative colitis presents to the ED c/o fatigue (described as exhaustion), myalgias, fever, RUQ pain, nausea and dry cough, poor PO intake since Friday. Pt ED tested negative for flu and COVID. Denies sore throat, runny nose, diarrhea, constipation.       PMH/PSH:  Ulcerative colitis  H/O: , x3    FAMILY HISTORY:    SOCIAL HISTORY:  Smoker: denies/never  ETOH use: socially  Ilicit Drug use: denies    Occupation:   Lives with: 3 kids  Assist device use: none    Code Status: full code  HCP:      MEDICATIONS  (STANDING):  sodium chloride 0.9% Bolus 1000 milliLiter(s) IV Bolus once    No Known Allergies                                                            LABS:                        12.9   7.64  )-----------( 75       ( 2023 12:41 )             35.9         130<L>  |  95<L>  |  23  ----------------------------<  116<H>  3.5   |  28  |  1.10    Ca    8.5      2023 12:41    TPro  7.2  /  Alb  2.6<L>  /  TBili  1.9<H>  /  DBili  x   /  AST  196<H>  /  ALT  186<H>  /  AlkPhos  110  -    PT/INR - ( 2023 12:41 )   PT: 18.6 sec;   INR: 1.60 ratio      PTT - ( 2023 12:41 )  PTT:35.1 sec    Urinalysis Basic - ( 2023 14:45 )  Color: Yellow / Appearance: Slightly Turbid / S.010 / pH: x  Gluc: x / Ketone: Trace  / Bili: Negative / Urobili: 4   Blood: x / Protein: 30 mg/dL / Nitrite: Negative   Leuk Esterase: Trace / RBC: 0-2 /HPF / WBC 0-2 /HPF   Sq Epi: x / Non Sq Epi: x / Bacteria: Many    Lactate, Blood (23 @ 15:38)    Lactate, Blood: 1.5 mmol/L    Per report from Dr Walton: peripheral blood smear + for parasites      < from: CT Abdomen and Pelvis w/ IV Cont (07.19.23 @ 14:54) >  FINDINGS:  LOWER CHEST: Small bilateral atelectasis.  LIVER: Indeterminate 1.4 cm hypodense lesion in the right hepatic lobe. If clinically indicated, nonemergent abdominal MR without and with IV contrast may be pursued for further evaluation.  BILE DUCTS: Normal caliber.  GALLBLADDER: No CT evidence for gallstones. Apparent nonspecific trace pericholecystic fluid. If clinically indicated, gallbladder ultrasound may be pursued for further evaluation.  SPLEEN: Within normal limits.  PANCREAS: Within normal limits.  ADRENALS: Within normal limits.  KIDNEYS/URETERS: Within normal limits except for a small hypodense lesion in the left kidney, too small to characterize.  BLADDER: Within normal limits.  REPRODUCTIVE ORGANS: The uterus and adnexa appear grossly unremarkable.  BOWEL: No bowel obstruction or grossly thickened bowel wall. Appendix within normal limits.  PERITONEUM: No free air or ascites.  VESSELS: Within normal limits.  RETROPERITONEUM/LYMPH NODES: No lymphadenopathy.  ABDOMINAL WALL: Tiny fat-containing umbilical hernia.  BONES: Mild degenerative spondylosis. Grade 1 anterolisthesis at L4-5.  IMPRESSION:  No CT evidence for gallstones. Apparent nonspecific trace pericholecystic fluid. If clinically indicated, gallbladder ultrasoundmay be pursued for further evaluation.  Indeterminate 1.4 cm hypodense lesion in the right hepatic lobe. If clinically indicated, nonemergent abdominal MR without and with IV contrast may be pursued for further evaluation.  --- End of Report ---  < end of copied text >    < from: US Abdomen Upper Quadrant Right (23 @ 15:59) >  FINDINGS:  Liver: Within normal limits in echogenicity and size. There is a 1.5 x 1.3 x 1.3 cm echogenic lesion in the right hepatic lobe, presumably a hepatic hemangioma, however not characterized on the current exam. The visualized main portal vein appears patent with normal direction of flow.  Bile ducts: Normal caliber. Common bile duct measures 2 mm.  Gallbladder: No gallstones are seen within the gallbladder.  Negative sonographic Almeida's sign. There is mild, focal anterior gallbladder wall thickening/edema, up to 4.5 mm,  located between the gallbladder and the liver, as was suggested on recent prior CT. The remainder of the gallbladder wall appears within normal limits.  Pancreas: Visualized portions are within normal limits.  Right kidney: 11.6 cm. No hydronephrosis.  Ascites: None.  Aorta/IVC: Visualized portions are within normal limits.  IMPRESSION:  Nonspecific, mild, focal anterior gallbladder wall thickening/edema located between the liver and gallbladder.  Remainder of the gallbladder appears within normal limits. No gallstones are seen. Negative sonographic Almeida sign. No intra or extrahepatic biliary ductal dilatation. Finding is nonspecific.  Question hepatitis or other secondary cause for mild gallbladder wall thickening. Recommend clinical correlation.  Short interval repeat exam or MRI/MRCP can be obtained for additional evaluation as clinically indicated.  There is a 1.5 x 1.3 x 1.3 cm echogenic lesion in the right hepatic lobe, presumably a hepatic hemangioma, however not characterized on the current exam. If clinically indicated, abdominal MR without and with IV contrast may be pursued for further evaluation, when clinically feasible.  --- End of Report ---  < end of copied text >      T(C): 37 (23 @ 16:15), Max: 37 (23 @ 16:15)  HR: 77 (23 @ 16:15) (69 - 85), NSR  BP: 94/73 (23 @ 16:15) (78/51 - 108/86)  RR: 26 (23 @ 16:15) (21 - 27)  SpO2: 96% (23 @ 16:15) (96% - 97%)      Physical Exam  General: WD, WN, uncomfortable looking but NAD                                                         Neuro: A+O x 3, non-focal, speech clear and intact                     Eyes: PERRL, EOMI   ENT: dry mouth  Neck: supple, no JVD, trachea midline   Chest: CTA, good air entry, equal bilaterally, no wheezes/rales/rhonchi, normal excursion, no accessory muscle use noted  CV: regular rate, regular rhythm, +S1S2  GI: soft, sore but not tender, ND, +BS  Extremities: LEGER x 4, no C/C/E, distal motor/neuro/circ intact  SKIN: warm, dry, intact       Time spent: 56 minutes (includes patient assessment, examination, discharge planning, coordination of care, patient and family education and counseling

## 2023-07-19 NOTE — ED PROVIDER NOTE - SHIFT CHANGE DETAILS
signed out to Dr. Walton. noted to have intracellular parasites in blood. will add lyme/babesia.  Pending CTabd/pelvis, likely admission.

## 2023-07-19 NOTE — ED PROVIDER NOTE - NS_ ATTENDINGSCRIBEDETAILS _ED_A_ED_FT
I, Ritu Guy DO,  performed the initial face to face bedside interview with this patient regarding history of present illness, review of symptoms and relevant past medical, social and family history.  I completed an independent physical examination.  I was the initial provider who evaluated this patient.   I personally saw the patient and performed a substantive portion of the visit including all aspects of the medical decision making.  The history, relevant review of systems, past medical and surgical history, medical decision making, and physical examination was documented by the scribe in my presence and I attest to the accuracy of the documentation.

## 2023-07-19 NOTE — PHARMACOTHERAPY INTERVENTION NOTE - COMMENTS
Medication reconciliation completed.  Reviewed Medication list and confirmed med allergies with patient; confirmed with Dr. First Medgeovanni.

## 2023-07-19 NOTE — ED PROVIDER NOTE - NS ED ROS FT
General: no vomiting. +fever  HEENT: No loc, no ha, no dizziness  Respiratory: no trouble breathing. +cough  Cardiovascular: No chest pain  GI: no diarrhea. +abd pain, +nausea  : No urinary complaints  Endocrine: no generalized weakness  Neurological: No weakness, numbness  MSK: no recent trauma

## 2023-07-19 NOTE — H&P ADULT - NSICDXPASTMEDICALHX_GEN_ALL_CORE_FT
PAST MEDICAL HISTORY:  Ulcerative colitis      Cephalexin Pregnancy And Lactation Text: This medication is Pregnancy Category B and considered safe during pregnancy.  It is also excreted in breast milk but can be used safely for shorter doses.

## 2023-07-20 LAB
ALBUMIN SERPL ELPH-MCNC: 1.8 G/DL — LOW (ref 3.3–5)
ALP SERPL-CCNC: 81 U/L — SIGNIFICANT CHANGE UP (ref 40–120)
ALT FLD-CCNC: 116 U/L — HIGH (ref 12–78)
ANION GAP SERPL CALC-SCNC: 5 MMOL/L — SIGNIFICANT CHANGE UP (ref 5–17)
AST SERPL-CCNC: 101 U/L — HIGH (ref 15–37)
B BURGDOR C6 AB SER-ACNC: NEGATIVE — SIGNIFICANT CHANGE UP
B BURGDOR IGG+IGM SER QL IB: SIGNIFICANT CHANGE UP
B BURGDOR IGG+IGM SER-ACNC: 0.43 INDEX — SIGNIFICANT CHANGE UP (ref 0.01–0.89)
BABESIA MICROTI PCR, BLD RESULT: DETECTED
BASOPHILS # BLD AUTO: 0.02 K/UL — SIGNIFICANT CHANGE UP (ref 0–0.2)
BASOPHILS NFR BLD AUTO: 0.3 % — SIGNIFICANT CHANGE UP (ref 0–2)
BILIRUB SERPL-MCNC: 1.1 MG/DL — SIGNIFICANT CHANGE UP (ref 0.2–1.2)
BUN SERPL-MCNC: 17 MG/DL — SIGNIFICANT CHANGE UP (ref 7–23)
CALCIUM SERPL-MCNC: 7.4 MG/DL — LOW (ref 8.5–10.1)
CHLORIDE SERPL-SCNC: 104 MMOL/L — SIGNIFICANT CHANGE UP (ref 96–108)
CO2 SERPL-SCNC: 25 MMOL/L — SIGNIFICANT CHANGE UP (ref 22–31)
CREAT SERPL-MCNC: 0.78 MG/DL — SIGNIFICANT CHANGE UP (ref 0.5–1.3)
CULTURE RESULTS: SIGNIFICANT CHANGE UP
EGFR: 91 ML/MIN/1.73M2 — SIGNIFICANT CHANGE UP
EOSINOPHIL # BLD AUTO: 0.04 K/UL — SIGNIFICANT CHANGE UP (ref 0–0.5)
EOSINOPHIL NFR BLD AUTO: 0.6 % — SIGNIFICANT CHANGE UP (ref 0–6)
GLUCOSE SERPL-MCNC: 104 MG/DL — HIGH (ref 70–99)
HAV IGM SER-ACNC: SIGNIFICANT CHANGE UP
HBV CORE IGM SER-ACNC: SIGNIFICANT CHANGE UP
HBV SURFACE AG SER-ACNC: SIGNIFICANT CHANGE UP
HCT VFR BLD CALC: 28.9 % — LOW (ref 34.5–45)
HCV AB S/CO SERPL IA: 0.18 S/CO — SIGNIFICANT CHANGE UP (ref 0–0.99)
HCV AB SERPL-IMP: SIGNIFICANT CHANGE UP
HGB BLD-MCNC: 10.1 G/DL — LOW (ref 11.5–15.5)
IMM GRANULOCYTES NFR BLD AUTO: 1 % — HIGH (ref 0–0.9)
LYMPHOCYTES # BLD AUTO: 1.19 K/UL — SIGNIFICANT CHANGE UP (ref 1–3.3)
LYMPHOCYTES # BLD AUTO: 17.2 % — SIGNIFICANT CHANGE UP (ref 13–44)
MANUAL DIF COMMENT BLD-IMP: SIGNIFICANT CHANGE UP
MANUAL SMEAR VERIFICATION: SIGNIFICANT CHANGE UP
MCHC RBC-ENTMCNC: 29.3 PG — SIGNIFICANT CHANGE UP (ref 27–34)
MCHC RBC-ENTMCNC: 34.9 GM/DL — SIGNIFICANT CHANGE UP (ref 32–36)
MCV RBC AUTO: 83.8 FL — SIGNIFICANT CHANGE UP (ref 80–100)
MONOCYTES # BLD AUTO: 1.23 K/UL — HIGH (ref 0–0.9)
MONOCYTES NFR BLD AUTO: 17.8 % — HIGH (ref 2–14)
NEUTROPHILS # BLD AUTO: 4.36 K/UL — SIGNIFICANT CHANGE UP (ref 1.8–7.4)
NEUTROPHILS NFR BLD AUTO: 63.1 % — SIGNIFICANT CHANGE UP (ref 43–77)
PLAT MORPH BLD: NORMAL — SIGNIFICANT CHANGE UP
PLATELET # BLD AUTO: 96 K/UL — LOW (ref 150–400)
POTASSIUM SERPL-MCNC: 3.7 MMOL/L — SIGNIFICANT CHANGE UP (ref 3.5–5.3)
POTASSIUM SERPL-SCNC: 3.7 MMOL/L — SIGNIFICANT CHANGE UP (ref 3.5–5.3)
PROT SERPL-MCNC: 5.4 GM/DL — LOW (ref 6–8.3)
RAPID RVP RESULT: SIGNIFICANT CHANGE UP
RBC # BLD: 3.45 M/UL — LOW (ref 3.8–5.2)
RBC # FLD: 14.6 % — HIGH (ref 10.3–14.5)
RBC BLD AUTO: SIGNIFICANT CHANGE UP
SARS-COV-2 RNA SPEC QL NAA+PROBE: SIGNIFICANT CHANGE UP
SODIUM SERPL-SCNC: 134 MMOL/L — LOW (ref 135–145)
SPECIMEN SOURCE: SIGNIFICANT CHANGE UP
WBC # BLD: 6.91 K/UL — SIGNIFICANT CHANGE UP (ref 3.8–10.5)
WBC # FLD AUTO: 6.91 K/UL — SIGNIFICANT CHANGE UP (ref 3.8–10.5)

## 2023-07-20 PROCEDURE — 99232 SBSQ HOSP IP/OBS MODERATE 35: CPT

## 2023-07-20 PROCEDURE — 93010 ELECTROCARDIOGRAM REPORT: CPT

## 2023-07-20 PROCEDURE — 71250 CT THORAX DX C-: CPT | Mod: 26

## 2023-07-20 RX ORDER — LACTOBACILLUS ACIDOPHILUS 100MM CELL
1 CAPSULE ORAL
Refills: 0 | Status: DISCONTINUED | OUTPATIENT
Start: 2023-07-20 | End: 2023-07-20

## 2023-07-20 RX ORDER — DEXTROSE MONOHYDRATE, SODIUM CHLORIDE, AND POTASSIUM CHLORIDE 50; .745; 4.5 G/1000ML; G/1000ML; G/1000ML
1000 INJECTION, SOLUTION INTRAVENOUS
Refills: 0 | Status: DISCONTINUED | OUTPATIENT
Start: 2023-07-20 | End: 2023-07-21

## 2023-07-20 RX ORDER — BENZOCAINE AND MENTHOL 5; 1 G/100ML; G/100ML
1 LIQUID ORAL
Refills: 0 | Status: DISCONTINUED | OUTPATIENT
Start: 2023-07-20 | End: 2023-08-01

## 2023-07-20 RX ORDER — ACETAMINOPHEN 500 MG
1000 TABLET ORAL ONCE
Refills: 0 | Status: COMPLETED | OUTPATIENT
Start: 2023-07-20 | End: 2023-07-20

## 2023-07-20 RX ORDER — SODIUM CHLORIDE 9 MG/ML
1000 INJECTION INTRAMUSCULAR; INTRAVENOUS; SUBCUTANEOUS
Refills: 0 | Status: COMPLETED | OUTPATIENT
Start: 2023-07-20 | End: 2023-07-20

## 2023-07-20 RX ORDER — SACCHAROMYCES BOULARDII 250 MG
250 POWDER IN PACKET (EA) ORAL
Refills: 0 | Status: DISCONTINUED | OUTPATIENT
Start: 2023-07-20 | End: 2023-08-01

## 2023-07-20 RX ADMIN — Medication 1 TABLET(S): at 10:17

## 2023-07-20 RX ADMIN — BENZOCAINE AND MENTHOL 1 LOZENGE: 5; 1 LIQUID ORAL at 13:41

## 2023-07-20 RX ADMIN — MAGNESIUM OXIDE 400 MG ORAL TABLET 400 MILLIGRAM(S): 241.3 TABLET ORAL at 21:42

## 2023-07-20 RX ADMIN — ATOVAQUONE 750 MILLIGRAM(S): 750 SUSPENSION ORAL at 10:17

## 2023-07-20 RX ADMIN — PREGABALIN 1000 MICROGRAM(S): 225 CAPSULE ORAL at 10:17

## 2023-07-20 RX ADMIN — Medication 250 MILLIGRAM(S): at 21:42

## 2023-07-20 RX ADMIN — Medication 650 MILLIGRAM(S): at 10:17

## 2023-07-20 RX ADMIN — DEXTROSE MONOHYDRATE, SODIUM CHLORIDE, AND POTASSIUM CHLORIDE 85 MILLILITER(S): 50; .745; 4.5 INJECTION, SOLUTION INTRAVENOUS at 14:05

## 2023-07-20 RX ADMIN — Medication 650 MILLIGRAM(S): at 03:14

## 2023-07-20 RX ADMIN — ATOVAQUONE 750 MILLIGRAM(S): 750 SUSPENSION ORAL at 00:12

## 2023-07-20 RX ADMIN — ATOVAQUONE 750 MILLIGRAM(S): 750 SUSPENSION ORAL at 04:02

## 2023-07-20 RX ADMIN — AZITHROMYCIN 255 MILLIGRAM(S): 500 TABLET, FILM COATED ORAL at 14:24

## 2023-07-20 RX ADMIN — Medication 250 MILLIGRAM(S): at 15:08

## 2023-07-20 RX ADMIN — SODIUM CHLORIDE 75 MILLILITER(S): 9 INJECTION INTRAMUSCULAR; INTRAVENOUS; SUBCUTANEOUS at 03:14

## 2023-07-20 RX ADMIN — ATOVAQUONE 750 MILLIGRAM(S): 750 SUSPENSION ORAL at 21:42

## 2023-07-20 RX ADMIN — MAGNESIUM OXIDE 400 MG ORAL TABLET 400 MILLIGRAM(S): 241.3 TABLET ORAL at 00:11

## 2023-07-20 RX ADMIN — Medication 400 MILLIGRAM(S): at 16:26

## 2023-07-20 NOTE — CONSULT NOTE ADULT - SUBJECTIVE AND OBJECTIVE BOX
Patient is a 52y old  Female who presents with a chief complaint of Generalized Malaise, Fatigue, Myalgias, Fever, Abdominal Pain, Nausea, decreased PO intake. (19 Jul 2023 23:45)    HPI:  52 year old female PMHx significant for ulcerative colitis presents to the [facility] for further evaluation and management of symptoms of progressively worsening generalized weakness, malaise/fatigue, myalgias, subjective fevers (TMax at home 103.7'F), generalized abdominal discomfort and decreased PO intake which began on Friday (7/14).  The patient states that her symptoms progressed despite taking Tylenol and Motrin prn. Of note the patient reportedly tested negative for Influenza and COVID-19. Upon triage the patient met sepsis criteria => Vital signs in triage => BP 86/53, HR 70/min, RR 27/min, SpO2 97% on room air. In the ED CCM was consulted to further evaluate.  Labs => PLT 75, Peripheral smear notable for intra-erythrocytic ring form parasites, INR 1.6, Alb 2.6, TBili 1.9, AST//186, CT ABD/Pelvis w/ IV Contrast => No CT evidence for gallstones. Apparent nonspecific trace pericholecystic fluid. US Abdomen => Nonspecific, mild, focal anterior gallbladder wall thickening/edema located between the liver and gallbladder.  Remainder of the gallbladder appears within normal limits. No gallstones are seen. Negative sonographic Almeida sign. No intra or extrahepatic biliary ductal dilatation. Finding is nonspecific.  Question hepatitis or other secondary cause for mild gallbladder wall thickening.  In the ED the patient received Acetaminophen 650mg PO x 1, Ceftriaxone 1g IVPB x 1, Azithromycin 500mg IVPB x 1, Ketorolac 30mg IVP x 1, and NS x 1.9L.         PMH: as above  PSH: as above    Meds: per reconciliation sheet, noted below  MEDICATIONS  (STANDING):  atovaquone  Suspension 750 milliGRAM(s) Oral two times a day  azithromycin  IVPB 500 milliGRAM(s) IV Intermittent every 24 hours  cyanocobalamin 1000 MICROGram(s) Oral daily  magnesium oxide 400 milliGRAM(s) Oral at bedtime  multivitamin 1 Tablet(s) Oral daily      Allergies    No Known Allergies    Intolerances      Social: no smoking, no alcohol, no illegal drugs; no recent travel, no exposure to TB  FAMILY HISTORY:  No pertinent family history in first degree relatives       no history of premature cardiovascular disease in first degree relatives    ROS: +fevers/chills, no HA, no dizziness, no sore throat, no blurry vision, no CP, no palpitations, no SOB, no cough,  abdominal pain, no diarrhea, no N/V, no dysuria, no leg pain, no claudication, no rash, no joint aches, no rectal pain or bleeding, no night sweats + weakness, fatigue     All other systems reviewed and are negative    Vital Signs Last 24 Hrs  T(C): 37.4 (20 Jul 2023 07:44), Max: 39.5 (19 Jul 2023 19:00)  T(F): 99.3 (20 Jul 2023 07:44), Max: 103.1 (19 Jul 2023 19:00)  HR: 96 (20 Jul 2023 07:44) (69 - 96)  BP: 100/67 (20 Jul 2023 07:44) (78/51 - 108/86)  BP(mean): 63 (20 Jul 2023 03:30) (59 - 89)  RR: 18 (20 Jul 2023 07:44) (16 - 27)  SpO2: 95% (20 Jul 2023 07:44) (94% - 97%)    Parameters below as of 20 Jul 2023 07:44  Patient On (Oxygen Delivery Method): room air      Daily Height in cm: 162.56 (19 Jul 2023 12:19)    Daily     PE:  Constitutional: NAD   HEENT: NC/AT, EOMI, PERRLA, conjunctivae clear; ears and nose atraumatic; pharynx benign  Neck: supple; thyroid not palpable  Back: no tenderness  Respiratory: respiratory effort normal; clear to auscultation  Cardiovascular: S1S2 regular, no murmurs  Abdomen: soft, not tender, not distended, positive BS; liver and spleen WNL  Genitourinary: no suprapubic tenderness  Lymphatic: no LN palpable  Musculoskeletal: no muscle tenderness, no joint swelling or tenderness  Extremities: no pedal edema  Neurological/ Psychiatric: AxOx3, Judgement and insight normal;  moving all extremities  Skin: no rashes; no palpable lesions    Labs: all available labs reviewed                        10.1   6.91  )-----------( 96       ( 20 Jul 2023 06:14 )             28.9     07-20    134<L>  |  104  |  17  ----------------------------<  104<H>  3.7   |  25  |  0.78    Ca    7.4<L>      20 Jul 2023 06:14    TPro  5.4<L>  /  Alb  1.8<L>  /  TBili  1.1  /  DBili  x   /  AST  101<H>  /  ALT  116<H>  /  AlkPhos  81  07-20     LIVER FUNCTIONS - ( 20 Jul 2023 06:14 )  Alb: 1.8 g/dL / Pro: 5.4 gm/dL / ALK PHOS: 81 U/L / ALT: 116 U/L / AST: 101 U/L / GGT: x           Urinalysis Basic - ( 20 Jul 2023 06:14 )    Color: x / Appearance: x / SG: x / pH: x  Gluc: 104 mg/dL / Ketone: x  / Bili: x / Urobili: x   Blood: x / Protein: x / Nitrite: x   Leuk Esterase: x / RBC: x / WBC x   Sq Epi: x / Non Sq Epi: x / Bacteria: x        Culture Results:   NEGATIVE for Plasmodium antigens. Microscopy is performed for  confirmation.    < end of copied text >  This test does not detect the presence of Babesia species.  If Babesiosis is suspected, please order test for Babesia PCR: Babesia  microti PCR Bld  ************************************************************  Babesia species  by Giemsa Stain  Parasitemia = 7.4% (07-19 @ 12:41)    Radiology: all available radiological tests reviewed  < from: CT Abdomen and Pelvis w/ IV Cont (07.19.23 @ 14:54) >    ACC: [id number] EXAM:  CT ABDOMEN AND PELVIS IC   ORDERED BY: JAY CHA     PROCEDURE DATE:  07/19/2023          INTERPRETATION:  CLINICAL INFORMATION: Abdominal pain    COMPARISON: None.    CONTRAST/COMPLICATIONS:  IV Contrast: Omnipaque 350  90cc administered   10 cc discarded  Oral Contrast: NONE  Complications: None reported at time of study completion    PROCEDURE:  CT of the Abdomen and Pelvis was performed.  Sagittal and coronal reformats were performed.    FINDINGS:  LOWER CHEST: Small bilateral atelectasis.    LIVER: Indeterminate 1.4 cm hypodense lesion in the right hepatic lobe.   If clinically indicated, nonemergent abdominal MR without and with IV   contrast may be pursued for further evaluation.  BILE DUCTS: Normal caliber.  GALLBLADDER: No CT evidence for gallstones. Apparent nonspecific trace   pericholecystic fluid. If clinically indicated, gallbladder ultrasound   may be pursued for further evaluation.  SPLEEN: Within normal limits.  PANCREAS: Within normal limits.  ADRENALS: Within normal limits.  KIDNEYS/URETERS: Within normal limits except for a small hypodense lesion   in the left kidney, too small to characterize.    BLADDER: Within normal limits.  REPRODUCTIVE ORGANS: The uterus and adnexa appear grossly unremarkable.    BOWEL: No bowel obstruction or grossly thickened bowel wall. Appendix   within normal limits.  PERITONEUM: No free air or ascites.  VESSELS: Within normal limits.  RETROPERITONEUM/LYMPH NODES: No lymphadenopathy.  ABDOMINAL WALL: Tiny fat-containing umbilical hernia.  BONES: Mild degenerative spondylosis. Grade 1 anterolisthesis at L4-5.    IMPRESSION:  No CT evidence for gallstones. Apparent nonspecific trace pericholecystic   fluid. If clinically indicated, gallbladder ultrasoundmay be pursued for   further evaluation.    Indeterminate 1.4 cm hypodense lesion in the right hepatic lobe. If   clinically indicated, nonemergent abdominal MR without and with IV   contrast may be pursued for further evaluation.      ACC: [id number] EXAM:  US ABDOMEN RT UPR QUADRANT   ORDERED BY: JAY CHA     PROCEDURE DATE:  07/19/2023          INTERPRETATION:  CLINICAL INFORMATION: Right upper quadrant pain, fever    COMPARISON: CT of the abdomen and pelvis from 7/19/2023    TECHNIQUE: Sonography of the right upper quadrant.    FINDINGS:  Liver: Within normal limits in echogenicity and size. There is a 1.5 x   1.3 x 1.3 cm echogenic lesion in the right hepatic lobe, presumably a   hepatic hemangioma, however not characterized on the current exam. The   visualized main portal vein appears patent with normal direction of flow.  Bile ducts: Normal caliber. Common bile duct measures 2 mm.  Gallbladder: No gallstones are seen within the gallbladder.  Negative   sonographic Almeida's sign. There is mild, focal anterior gallbladder wall   thickening/edema, up to 4.5 mm,  located between the gallbladder and the   liver, as was suggested on recent prior CT. The remainder of the   gallbladder wall appears within normal limits.  Pancreas: Visualized portions are within normal limits.  Right kidney: 11.6 cm. No hydronephrosis.  Ascites: None.  Aorta/IVC: Visualized portions are within normal limits.    IMPRESSION:  Nonspecific, mild, focal anterior gallbladder wall thickening/edema   located between the liver and gallbladder.  Remainder of the gallbladder   appears within normal limits. No gallstones are seen. Negative   sonographic Almeida sign. No intra or extrahepatic biliary ductal   dilatation. Finding is nonspecific.  Question hepatitis or other   secondary cause for mild gallbladder wall thickening. Recommend clinical   correlation.  Short interval repeat exam or MRI/MRCP can be obtained for   additional evaluation as clinically indicated.    There is a 1.5 x 1.3 x 1.3 cm echogenic lesion in the right hepatic lobe,   presumably a hepatic hemangioma, however not characterized on the current   exam. If clinically indicated, abdominal MR without and with IV contrast   may be pursued for further evaluation, when clinically feasible.        Advanced directives addressed: full resuscitation

## 2023-07-20 NOTE — PROGRESS NOTE ADULT - SUBJECTIVE AND OBJECTIVE BOX
Subjective:  Chief complain :  Generalized Malaise, Fatigue, Myalgias, Fever, Abdominal Pain, Nausea, decreased PO intake.    HPI:  52 year old female PMHx significant for ulcerative colitis presents to the [facility] on 7/19/23  for further evaluation and management of symptoms of progressively worsening generalized weakness, malaise/fatigue, myalgias, subjective fevers (TMax at home 103.7'F), generalized abdominal discomfort and decreased PO intake which began on Friday (7/14).  The patient states that her symptoms progressed despite taking Tylenol and Motrin prn. Of note the patient reportedly tested negative for Influenza and COVID-19. Upon triage the patient met sepsis criteria => Vital signs in triage => BP 86/53, HR 70/min, RR 27/min, SpO2 97% on room air. In the ED CCM was consulted to further evaluate.     Labs => PLT 75, Peripheral smear notable for intra-erythrocytic ring form parasites, INR 1.6, Alb 2.6, TBili 1.9, AST//186, CT ABD/Pelvis w/ IV Contrast => No CT evidence for gallstones. Apparent nonspecific trace pericholecystic fluid. If clinically indicated, gallbladder ultrasound may be pursued for further evaluation. Indeterminate 1.4 cm hypodense lesion in the right hepatic lobe. If clinically indicated, nonemergent abdominal MR without and with IV contrast may be pursued for further evaluation.  US Abdomen => Nonspecific, mild, focal anterior gallbladder wall thickening/edema located between the liver and gallbladder.  Remainder of the gallbladder appears within normal limits. No gallstones are seen. Negative sonographic Almeida sign. No intra or extrahepatic biliary ductal dilatation. Finding is nonspecific.  Question hepatitis or other secondary cause for mild gallbladder wall thickening. Recommend clinical correlation.  Short interval repeat exam or MRI/MRCP can be obtained for additional evaluation as clinically indicated. There is a 1.5 x 1.3 x 1.3 cm echogenic lesion in the right hepatic lobe, presumably a hepatic hemangioma, however not characterized on the current exam. If clinically indicated, abdominal MR without and with IV contrast may be pursued for further evaluation, when clinically feasible.   In the ED the patient received Acetaminophen 650mg PO x 1, Ceftriaxone 1g IVPB x 1, Azithromycin 500mg IVPB x 1, Ketorolac 30mg IVP x 1, and NS x 1.9L.      7/20 - Patient seen and examined at bedside earlier today, + generalized chills, aches, poor po intake, generalized abdominal discomfort     Review of system- Rest of the review of system are negative except mentioned in HPI     Vital sings reviewed for last 24 h  T(C): 37.4 (07-20-23 @ 07:44), Max: 39.5 (07-19-23 @ 19:00)  HR: 96 (07-20-23 @ 07:44) (76 - 96)  BP: 100/67 (07-20-23 @ 07:44) (93/56 - 106/66)  RR: 18 (07-20-23 @ 07:44) (16 - 27)  SpO2: 95% (07-20-23 @ 07:44) (94% - 97%)    Physical exam:   General : NAD, appear to be of stated age , well groomed   NERVOUS SYSTEM:  Alert & Oriented X3, non- focal exam, Motor Strength 5/5 B/L upper and lower extremities; DTRs 2+ intact and symmetric  HEAD:  Atraumatic, Normocephalic  EYES: EOMI, PERRLA, conjunctiva and sclera clear  HEENT: Moist mucous membranes, Supple neck , No JVD  CHEST: Clear to auscultation bilaterally; No rales, no rhonchi, no wheezing  HEART: Regular rate and rhythm; No murmurs, no rubs or gallops  ABDOMEN: Soft, Non-tender, Non-distended; Bowel sounds present, no guarding , no peritoneal irritation   GENITOURINARY- Voiding, no suprapubic tenderness  EXTREMITIES:  2+ Peripheral Pulses, No clubbing, cyanosis,   edema  MUSCULOSKELETAL:- No muscle tenderness, Muscle tone normal, No joint tenderness, no Joint swelling,  Joint ROM –normal   SKIN-no rash, no lesion    Labs radiologic and other test : all reviewed and interpreted :                         10.1   6.91  )-----------( 96       ( 20 Jul 2023 06:14 )             28.9     07-20    134<L>  |  104  |  17  ----------------------------<  104<H>  3.7   |  25  |  0.78    Ca    7.4<L>      20 Jul 2023 06:14    TPro  5.4<L>  /  Alb  1.8<L>  /  TBili  1.1  /  DBili  x   /  AST  101<H>  /  ALT  116<H>  /  AlkPhos  81  07-20    PT/INR - ( 19 Jul 2023 12:41 )   PT: 18.6 sec;   INR: 1.60 ratio         PTT - ( 19 Jul 2023 12:41 )  PTT:35.1 sec    Blood Parasites- Travel Associated (07.19.23 @ 12:41)    Specimen Source: .Blood None   Culture Results:   NEGATIVE for Plasmodium antigens. Microscopy is performed for  confirmation.  This test does not detect the presence of Babesia species.  If Babesiosis is suspected, please order test for Babesia PCR: Babesia  microti PCR Bld  ************************************************************  Babesia species  by Giemsa Stain  Parasitemia = 7.4%     CT Chest No Cont (07.20.23 @ 11:46) >  IMPRESSION:    Mild septal thickening in both lungs, which may be due to pulmonary   edema/volume overload, or known infection.    Clusters small nodules in the right middle lobe, likely related to distal   airway impaction. Recommend CT chest in 3 months to assess for clearance.      Xray Chest 1 View- PORTABLE-Urgent (07.19.23 @ 16:01) >  IMPRESSION: Clear lungs.     US Abdomen Upper Quadrant Right (07.19.23 @ 15:59) >  IMPRESSION:  Nonspecific, mild, focal anterior gallbladder wall thickening/edema   located between the liver and gallbladder.  Remainder of the gallbladder   appears within normal limits. No gallstones are seen. Negative   sonographic Almeida sign. No intra or extrahepatic biliary ductal   dilatation. Finding is nonspecific.  Question hepatitis or other   secondary cause for mild gallbladder wall thickening. Recommend clinical   correlation.  Short interval repeat exam or MRI/MRCP can be obtained for   additional evaluation as clinically indicated.    There is a 1.5 x 1.3 x 1.3 cm echogenic lesion in the right hepatic lobe,   presumably a hepatic hemangioma, however not characterized on the current   exam. If clinically indicated, abdominal MR without and with IV contrast   may be pursued for further evaluation, when clinically feasible.     CT Abdomen and Pelvis w/ IV Cont (07.19.23 @ 14:54) >  IMPRESSION:  No CT evidence for gallstones. Apparent nonspecific trace pericholecystic   fluid. If clinically indicated, gallbladder ultrasoundmay be pursued for   further evaluation.    Indeterminate 1.4 cm hypodense lesion in the right hepatic lobe. If   clinically indicated, nonemergent abdominal MR without and with IV   contrast may be pursued for further evaluation.      RECENT CULTURES:  Culture Results:   NEGATIVE for Plasmodium antigens. Microscopy is performed for  confirmation.  This test does not detect the presence of Babesia species.  If Babesiosis is suspected, please order test for Babesia PCR: Babesia  microti PCR Bld  ************************************************************  Babesia species  by Giemsa Stain  Parasitemia = 7.4% (07.19.23 @ 12:41)        Cardiac testing : reviewed   EKG   12 Lead ECG (07.19.23 @ 15:54) >  Ventricular Rate 81 BPM  QTC Calculation(Bazett) 439 ms   Normal sinus rhythm  Possible Left atrial enlargement  Borderline ECG  No previous ECGs available    - TroponinI hsT: <-14.17      Procedures :     Devices:     Current medications:  acetaminophen     Tablet .. 650 milliGRAM(s) Oral every 6 hours PRN  aluminum hydroxide/magnesium hydroxide/simethicone Suspension 30 milliLiter(s) Oral every 4 hours PRN  atovaquone  Suspension 750 milliGRAM(s) Oral two times a day  azithromycin  IVPB 500 milliGRAM(s) IV Intermittent every 24 hours  benzocaine/menthol Lozenge 1 Lozenge Oral four times a day PRN  clonazePAM  Tablet 0.5 milliGRAM(s) Oral at bedtime PRN  cyanocobalamin 1000 MICROGram(s) Oral daily  magnesium oxide 400 milliGRAM(s) Oral at bedtime  melatonin 3 milliGRAM(s) Oral at bedtime PRN  multivitamin 1 Tablet(s) Oral daily  ondansetron Injectable 4 milliGRAM(s) IV Push every 8 hours PRN  saccharomyces boulardii 250 milliGRAM(s) Oral two times a day  sodium chloride 0.9% with potassium chloride 20 mEq/L 1000 milliLiter(s) IV Continuous <Continuous>

## 2023-07-20 NOTE — PROGRESS NOTE ADULT - ASSESSMENT
52 year old female PMHx significant for ulcerative colitis presents to the [facility] on 7/19/23  for further evaluation and management of symptoms of progressively worsening generalized weakness, malaise/fatigue, myalgias, subjective fevers (TMax at home 103.7'F), generalized abdominal discomfort and decreased PO intake which began on Friday (7/14).  The patient states that her symptoms progressed despite taking Tylenol and Motrin prn. Of note the patient reportedly tested negative for Influenza and COVID-19. Upon triage the patient met sepsis criteria => Vital signs in triage => BP 86/53, HR 70/min, RR 27/min, SpO2 97% on room air. In the ED CCM was consulted to further evaluate.     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 IV hydration  ~f/u PAN C+S , Lyme Vise IgG/IgM AB Reflex, hepatitis   ~ Babesia microti - PCR positive   ~f/u Enrlichea/Anaplasma - PCR  ~cont. Atovaquone 750mg po q12h  ~cont. Azithromycin 500mg IVPB daily  ~ID consult noted   ~cont. anti-pyretics prn    Cough   - cepacol  - CT chest - mild septal thickening in both lungs - ? edema, vs infection   - as per ID team    1.4 cm right hepatic lesion with transaminitis   - MRCP - pending  - CT abd - noted  - check hepatitis panel     Hypovolumic hyponatremia, Hypoalbuminemia  - c/w IV fluids  - Na improving    Hypocalcemia  - check vit D     UC - add probiotics    Anxiety ~cont. Clonazepam 0.5mg po qhs prn    ADHD  ~will hold Dextroamphetamine-Amphetamine (takes 10mg po daily)  ~takes Dextroamphetamine-Amphetamine ER 20mg daily prn    Vte ppx  ~cont. SCDs for now due to low Platelets

## 2023-07-20 NOTE — CONSULT NOTE ADULT - ASSESSMENT
52 year old female PMHx significant for ulcerative colitis presents to the [facility] for further evaluation and management of symptoms of progressively worsening generalized weakness, malaise/fatigue, myalgias, subjective fevers (TMax at home 103.7'F), generalized abdominal discomfort and decreased PO intake which began on Friday (7/14).  The patient states that her symptoms progressed despite taking Tylenol and Motrin prn. Of note the patient reportedly tested negative for Influenza and COVID-19. Upon triage the patient met sepsis criteria => Vital signs in triage => BP 86/53, HR 70/min, RR 27/min, SpO2 97% on room air. In the ED CCM was consulted to further evaluate.  Labs => PLT 75, Peripheral smear notable for intra-erythrocytic ring form parasites, INR 1.6, Alb 2.6, TBili 1.9, AST//186, CT ABD/Pelvis w/ IV Contrast => No CT evidence for gallstones. Apparent nonspecific trace pericholecystic fluid. US Abdomen => Nonspecific, mild, focal anterior gallbladder wall thickening/edema located between the liver and gallbladder.  Remainder of the gallbladder appears within normal limits. No gallstones are seen. Negative sonographic Almeida sign. No intra or extrahepatic biliary ductal dilatation. Question hepatitis or other secondary cause for mild gallbladder wall thickening.  In the ED the patient received Acetaminophen 650mg PO x 1, Ceftriaxone 1g IVPB x 1, Azithromycin 500mg IVPB x 1, Ketorolac 30mg IVP x 1, and NS x 1.9L.     1. Fever. Babesiosis. Tick borne illness.   - imaging reviewed   - fever, anemia, transaminitis, thrombocytopenia d/t babesia  - > 4% parasitemia c/w severe disease ; f/u percent parasitemia  - agree with atovaquone 750mg BID, azithromycin 500mg daily   - will require 10 day course  - f/u lyme screen, ehrlichia, anaplasma pcr  - monitor temps  - tolerating abx well so far; no side effects noted  - reason for abx use and side effects reviewed with patient  - supportive care  - fu cbc    2. other issues - care per medicine

## 2023-07-20 NOTE — PATIENT PROFILE ADULT - CAREGIVER ADDRESS
[address] [id number] ~for now patient remains NPO  ~advance diet as tolerated  ~on Basal/Bolus regimen at [facility]. Will cont. when PO.

## 2023-07-20 NOTE — PATIENT PROFILE ADULT - FALL HARM RISK - HARM RISK INTERVENTIONS
Assistance with ambulation/Assistance OOB with selected safe patient handling equipment/Communicate Risk of Fall with Harm to all staff/Discuss with provider need for PT consult/Monitor gait and stability/Reinforce activity limits and safety measures with patient and family/Tailored Fall Risk Interventions/Visual Cue: Yellow wristband and red socks/Bed in lowest position, wheels locked, appropriate side rails in place/Call bell, personal items and telephone in reach/Instruct patient to call for assistance before getting out of bed or chair/Non-slip footwear when patient is out of bed/[place] to call system/Physically safe environment - no spills, clutter or unnecessary equipment/Purposeful Proactive Rounding/Room/bathroom lighting operational, light cord in reach

## 2023-07-21 LAB
24R-OH-CALCIDIOL SERPL-MCNC: 54.8 NG/ML — SIGNIFICANT CHANGE UP (ref 30–80)
ADD ON TEST-SPECIMEN IN LAB: SIGNIFICANT CHANGE UP
ALBUMIN SERPL ELPH-MCNC: 2.2 G/DL — LOW (ref 3.3–5)
ALP SERPL-CCNC: 110 U/L — SIGNIFICANT CHANGE UP (ref 40–120)
ALT FLD-CCNC: 135 U/L — HIGH (ref 12–78)
AMMONIA BLD-MCNC: 11 UMOL/L — SIGNIFICANT CHANGE UP (ref 11–32)
ANION GAP SERPL CALC-SCNC: 4 MMOL/L — LOW (ref 5–17)
ANISOCYTOSIS BLD QL: SLIGHT — SIGNIFICANT CHANGE UP
ANISOCYTOSIS BLD QL: SLIGHT — SIGNIFICANT CHANGE UP
AST SERPL-CCNC: 115 U/L — HIGH (ref 15–37)
BASOPHILS # BLD AUTO: 0 K/UL — SIGNIFICANT CHANGE UP (ref 0–0.2)
BASOPHILS # BLD AUTO: 0.02 K/UL — SIGNIFICANT CHANGE UP (ref 0–0.2)
BASOPHILS NFR BLD AUTO: 0 % — SIGNIFICANT CHANGE UP (ref 0–2)
BASOPHILS NFR BLD AUTO: 0.3 % — SIGNIFICANT CHANGE UP (ref 0–2)
BILIRUB SERPL-MCNC: 1.4 MG/DL — HIGH (ref 0.2–1.2)
BUN SERPL-MCNC: 12 MG/DL — SIGNIFICANT CHANGE UP (ref 7–23)
CALCIUM SERPL-MCNC: 8.2 MG/DL — LOW (ref 8.5–10.1)
CHLORIDE SERPL-SCNC: 103 MMOL/L — SIGNIFICANT CHANGE UP (ref 96–108)
CK SERPL-CCNC: 140 U/L — SIGNIFICANT CHANGE UP (ref 26–192)
CK SERPL-CCNC: 258 U/L — HIGH (ref 26–192)
CO2 SERPL-SCNC: 26 MMOL/L — SIGNIFICANT CHANGE UP (ref 22–31)
CREAT SERPL-MCNC: 0.74 MG/DL — SIGNIFICANT CHANGE UP (ref 0.5–1.3)
CRP SERPL-MCNC: 226 MG/L — HIGH
CULTURE RESULTS: SIGNIFICANT CHANGE UP
EGFR: 97 ML/MIN/1.73M2 — SIGNIFICANT CHANGE UP
EOSINOPHIL # BLD AUTO: 0 K/UL — SIGNIFICANT CHANGE UP (ref 0–0.5)
EOSINOPHIL # BLD AUTO: 0.03 K/UL — SIGNIFICANT CHANGE UP (ref 0–0.5)
EOSINOPHIL NFR BLD AUTO: 0 % — SIGNIFICANT CHANGE UP (ref 0–6)
EOSINOPHIL NFR BLD AUTO: 0.5 % — SIGNIFICANT CHANGE UP (ref 0–6)
ERYTHROCYTE [SEDIMENTATION RATE] IN BLOOD: 92 MM/HR — HIGH (ref 0–20)
FERRITIN SERPL-MCNC: 7144 NG/ML — HIGH (ref 13–330)
FOLATE SERPL-MCNC: >20 NG/ML — SIGNIFICANT CHANGE UP
GLUCOSE SERPL-MCNC: 109 MG/DL — HIGH (ref 70–99)
HCT VFR BLD CALC: 28.4 % — LOW (ref 34.5–45)
HCT VFR BLD CALC: 30.9 % — LOW (ref 34.5–45)
HGB BLD-MCNC: 10.8 G/DL — LOW (ref 11.5–15.5)
HGB BLD-MCNC: 9.9 G/DL — LOW (ref 11.5–15.5)
HYPOCHROMIA BLD QL: SLIGHT — SIGNIFICANT CHANGE UP
HYPOCHROMIA BLD QL: SLIGHT — SIGNIFICANT CHANGE UP
IMM GRANULOCYTES NFR BLD AUTO: 0.7 % — SIGNIFICANT CHANGE UP (ref 0–0.9)
IRON SATN MFR SERPL: 16 % — SIGNIFICANT CHANGE UP (ref 14–50)
IRON SATN MFR SERPL: 29 UG/DL — LOW (ref 30–160)
LYMPHOCYTES # BLD AUTO: 1.27 K/UL — SIGNIFICANT CHANGE UP (ref 1–3.3)
LYMPHOCYTES # BLD AUTO: 2.27 K/UL — SIGNIFICANT CHANGE UP (ref 1–3.3)
LYMPHOCYTES # BLD AUTO: 21 % — SIGNIFICANT CHANGE UP (ref 13–44)
LYMPHOCYTES # BLD AUTO: 28 % — SIGNIFICANT CHANGE UP (ref 13–44)
MAGNESIUM SERPL-MCNC: 2.1 MG/DL — SIGNIFICANT CHANGE UP (ref 1.6–2.6)
MANUAL SMEAR VERIFICATION: SIGNIFICANT CHANGE UP
MANUAL SMEAR VERIFICATION: SIGNIFICANT CHANGE UP
MCHC RBC-ENTMCNC: 29.1 PG — SIGNIFICANT CHANGE UP (ref 27–34)
MCHC RBC-ENTMCNC: 29.2 PG — SIGNIFICANT CHANGE UP (ref 27–34)
MCHC RBC-ENTMCNC: 34.9 GM/DL — SIGNIFICANT CHANGE UP (ref 32–36)
MCHC RBC-ENTMCNC: 35 GM/DL — SIGNIFICANT CHANGE UP (ref 32–36)
MCV RBC AUTO: 83.3 FL — SIGNIFICANT CHANGE UP (ref 80–100)
MCV RBC AUTO: 83.8 FL — SIGNIFICANT CHANGE UP (ref 80–100)
MICROCYTES BLD QL: SLIGHT — SIGNIFICANT CHANGE UP
MICROCYTES BLD QL: SLIGHT — SIGNIFICANT CHANGE UP
MONOCYTES # BLD AUTO: 1.03 K/UL — HIGH (ref 0–0.9)
MONOCYTES # BLD AUTO: 1.3 K/UL — HIGH (ref 0–0.9)
MONOCYTES NFR BLD AUTO: 16 % — HIGH (ref 2–14)
MONOCYTES NFR BLD AUTO: 17.1 % — HIGH (ref 2–14)
NEUTROPHILS # BLD AUTO: 3.65 K/UL — SIGNIFICANT CHANGE UP (ref 1.8–7.4)
NEUTROPHILS # BLD AUTO: 4.37 K/UL — SIGNIFICANT CHANGE UP (ref 1.8–7.4)
NEUTROPHILS NFR BLD AUTO: 54 % — SIGNIFICANT CHANGE UP (ref 43–77)
NEUTROPHILS NFR BLD AUTO: 60.4 % — SIGNIFICANT CHANGE UP (ref 43–77)
NRBC # BLD: 0 /100 — SIGNIFICANT CHANGE UP (ref 0–0)
NRBC # BLD: SIGNIFICANT CHANGE UP /100 WBCS (ref 0–0)
NT-PROBNP SERPL-SCNC: 777 PG/ML — HIGH (ref 0–125)
PARASITE BLD: PRESENT — SIGNIFICANT CHANGE UP
PHOSPHATE SERPL-MCNC: 2 MG/DL — LOW (ref 2.5–4.5)
PLAT MORPH BLD: NORMAL — SIGNIFICANT CHANGE UP
PLAT MORPH BLD: NORMAL — SIGNIFICANT CHANGE UP
PLATELET # BLD AUTO: 144 K/UL — LOW (ref 150–400)
PLATELET # BLD AUTO: 154 K/UL — SIGNIFICANT CHANGE UP (ref 150–400)
POTASSIUM SERPL-MCNC: 3.8 MMOL/L — SIGNIFICANT CHANGE UP (ref 3.5–5.3)
POTASSIUM SERPL-SCNC: 3.8 MMOL/L — SIGNIFICANT CHANGE UP (ref 3.5–5.3)
PROCALCITONIN SERPL-MCNC: 5.15 NG/ML — HIGH (ref 0.02–0.1)
PROT SERPL-MCNC: 6.9 GM/DL — SIGNIFICANT CHANGE UP (ref 6–8.3)
RBC # BLD: 3.39 M/UL — LOW (ref 3.8–5.2)
RBC # BLD: 3.71 M/UL — LOW (ref 3.8–5.2)
RBC # FLD: 14.7 % — HIGH (ref 10.3–14.5)
RBC # FLD: 14.9 % — HIGH (ref 10.3–14.5)
RBC BLD AUTO: ABNORMAL
RBC BLD AUTO: ABNORMAL
SODIUM SERPL-SCNC: 133 MMOL/L — LOW (ref 135–145)
SPECIMEN SOURCE: SIGNIFICANT CHANGE UP
TIBC SERPL-MCNC: 188 UG/DL — LOW (ref 220–430)
TSH SERPL-MCNC: 1.38 UU/ML — SIGNIFICANT CHANGE UP (ref 0.34–4.82)
UIBC SERPL-MCNC: 159 UG/DL — SIGNIFICANT CHANGE UP (ref 110–370)
URATE SERPL-MCNC: 2.7 MG/DL — SIGNIFICANT CHANGE UP (ref 2.5–7)
VARIANT LYMPHS # BLD: 2 % — SIGNIFICANT CHANGE UP (ref 0–6)
VIT B12 SERPL-MCNC: 737 PG/ML — SIGNIFICANT CHANGE UP (ref 232–1245)
WBC # BLD: 6.04 K/UL — SIGNIFICANT CHANGE UP (ref 3.8–10.5)
WBC # BLD: 8.1 K/UL — SIGNIFICANT CHANGE UP (ref 3.8–10.5)
WBC # FLD AUTO: 6.04 K/UL — SIGNIFICANT CHANGE UP (ref 3.8–10.5)
WBC # FLD AUTO: 8.1 K/UL — SIGNIFICANT CHANGE UP (ref 3.8–10.5)

## 2023-07-21 PROCEDURE — 74183 MRI ABD W/O CNTR FLWD CNTR: CPT | Mod: 26

## 2023-07-21 PROCEDURE — 99232 SBSQ HOSP IP/OBS MODERATE 35: CPT

## 2023-07-21 RX ORDER — ACETAMINOPHEN 500 MG
1000 TABLET ORAL ONCE
Refills: 0 | Status: COMPLETED | OUTPATIENT
Start: 2023-07-21 | End: 2023-07-21

## 2023-07-21 RX ORDER — IBUPROFEN 200 MG
400 TABLET ORAL EVERY 6 HOURS
Refills: 0 | Status: DISCONTINUED | OUTPATIENT
Start: 2023-07-21 | End: 2023-08-01

## 2023-07-21 RX ADMIN — Medication 200 MILLIGRAM(S): at 17:30

## 2023-07-21 RX ADMIN — Medication 650 MILLIGRAM(S): at 08:43

## 2023-07-21 RX ADMIN — BENZOCAINE AND MENTHOL 1 LOZENGE: 5; 1 LIQUID ORAL at 08:12

## 2023-07-21 RX ADMIN — ATOVAQUONE 750 MILLIGRAM(S): 750 SUSPENSION ORAL at 20:57

## 2023-07-21 RX ADMIN — Medication 200 MILLIGRAM(S): at 23:54

## 2023-07-21 RX ADMIN — BENZOCAINE AND MENTHOL 1 LOZENGE: 5; 1 LIQUID ORAL at 13:58

## 2023-07-21 RX ADMIN — Medication 400 MILLIGRAM(S): at 15:09

## 2023-07-21 RX ADMIN — Medication 1000 MILLIGRAM(S): at 10:34

## 2023-07-21 RX ADMIN — Medication 650 MILLIGRAM(S): at 23:54

## 2023-07-21 RX ADMIN — Medication 1000 MILLIGRAM(S): at 02:08

## 2023-07-21 RX ADMIN — Medication 250 MILLIGRAM(S): at 09:36

## 2023-07-21 RX ADMIN — MAGNESIUM OXIDE 400 MG ORAL TABLET 400 MILLIGRAM(S): 241.3 TABLET ORAL at 20:57

## 2023-07-21 RX ADMIN — ATOVAQUONE 750 MILLIGRAM(S): 750 SUSPENSION ORAL at 09:36

## 2023-07-21 RX ADMIN — DEXTROSE MONOHYDRATE, SODIUM CHLORIDE, AND POTASSIUM CHLORIDE 85 MILLILITER(S): 50; .745; 4.5 INJECTION, SOLUTION INTRAVENOUS at 02:16

## 2023-07-21 RX ADMIN — AZITHROMYCIN 255 MILLIGRAM(S): 500 TABLET, FILM COATED ORAL at 13:47

## 2023-07-21 RX ADMIN — Medication 400 MILLIGRAM(S): at 16:11

## 2023-07-21 RX ADMIN — Medication 650 MILLIGRAM(S): at 17:22

## 2023-07-21 RX ADMIN — Medication 400 MILLIGRAM(S): at 09:34

## 2023-07-21 RX ADMIN — PREGABALIN 1000 MICROGRAM(S): 225 CAPSULE ORAL at 09:36

## 2023-07-21 RX ADMIN — Medication 1 TABLET(S): at 09:36

## 2023-07-21 RX ADMIN — Medication 250 MILLIGRAM(S): at 20:57

## 2023-07-21 RX ADMIN — Medication 650 MILLIGRAM(S): at 08:13

## 2023-07-21 RX ADMIN — Medication 400 MILLIGRAM(S): at 00:42

## 2023-07-21 RX ADMIN — Medication 650 MILLIGRAM(S): at 16:22

## 2023-07-21 NOTE — PROGRESS NOTE ADULT - SUBJECTIVE AND OBJECTIVE BOX
Subjective:  Chief complain :  Generalized Malaise, Fatigue, Myalgias, Fever, Abdominal Pain, Nausea, decreased PO intake.    HPI:  52 year old female PMHx significant for ulcerative colitis presents to the [facility] on 7/19/23  for further evaluation and management of symptoms of progressively worsening generalized weakness, malaise/fatigue, myalgias, subjective fevers (TMax at home 103.7'F), generalized abdominal discomfort and decreased PO intake which began on Friday (7/14).  The patient states that her symptoms progressed despite taking Tylenol and Motrin prn. Of note the patient reportedly tested negative for Influenza and COVID-19. Upon triage the patient met sepsis criteria => Vital signs in triage => BP 86/53, HR 70/min, RR 27/min, SpO2 97% on room air. In the ED CCM was consulted to further evaluate.     Labs => PLT 75, Peripheral smear notable for intra-erythrocytic ring form parasites, INR 1.6, Alb 2.6, TBili 1.9, AST//186, CT ABD/Pelvis w/ IV Contrast => No CT evidence for gallstones. Apparent nonspecific trace pericholecystic fluid. If clinically indicated, gallbladder ultrasound may be pursued for further evaluation. Indeterminate 1.4 cm hypodense lesion in the right hepatic lobe. If clinically indicated, nonemergent abdominal MR without and with IV contrast may be pursued for further evaluation.  US Abdomen => Nonspecific, mild, focal anterior gallbladder wall thickening/edema located between the liver and gallbladder.  Remainder of the gallbladder appears within normal limits. No gallstones are seen. Negative sonographic Almeida sign. No intra or extrahepatic biliary ductal dilatation. Finding is nonspecific.  Question hepatitis or other secondary cause for mild gallbladder wall thickening. Recommend clinical correlation.  Short interval repeat exam or MRI/MRCP can be obtained for additional evaluation as clinically indicated. There is a 1.5 x 1.3 x 1.3 cm echogenic lesion in the right hepatic lobe, presumably a hepatic hemangioma, however not characterized on the current exam. If clinically indicated, abdominal MR without and with IV contrast may be pursued for further evaluation, when clinically feasible.   In the ED the patient received Acetaminophen 650mg PO x 1, Ceftriaxone 1g IVPB x 1, Azithromycin 500mg IVPB x 1, Ketorolac 30mg IVP x 1, and NS x 1.9L.      7/20 - Patient seen and examined at bedside earlier today, + generalized chills, aches, poor po intake, generalized abdominal discomfort   7/21 - pt seen and examined in am, + febrile, + muscle aches, denies cp, + cough persist, denies cp, abdominal pain , + lose bm    Review of system- Rest of the review of system are negative except mentioned in HPI    Vital sings reviewed for last 24 h  T(C): 37.3 (07-21-23 @ 18:18), Max: 39.4 (07-21-23 @ 00:18)  T(F): 99.1 (07-21-23 @ 18:18), Max: 102.9 (07-21-23 @ 00:18)  HR: 95 (07-21-23 @ 15:45) (73 - 97)  BP: 110/54 (07-21-23 @ 15:45) (91/54 - 110/54)  RR: 18 (07-21-23 @ 15:45) (18 - 18)  SpO2: 92% (07-21-23 @ 15:45) (91% - 93%)  Wt(kg): --  Daily     Daily   CAPILLARY BLOOD GLUCOSE          Physical exam:   General : NAD, appear to be of stated age , well groomed   NERVOUS SYSTEM:  Alert & Oriented X3, non- focal exam, Motor Strength 5/5 B/L upper and lower extremities; DTRs 2+ intact and symmetric  HEAD:  Atraumatic, Normocephalic  EYES: EOMI, PERRLA, conjunctiva and sclera clear  HEENT: Moist mucous membranes, Supple neck , No JVD  CHEST: Clear to auscultation bilaterally; No rales, no rhonchi, no wheezing  HEART: Regular rate and rhythm; No murmurs, no rubs or gallops  ABDOMEN: Soft, Non-tender, Non-distended; Bowel sounds present, no guarding , no peritoneal irritation   GENITOURINARY- Voiding, no suprapubic tenderness  EXTREMITIES:  2+ Peripheral Pulses, No clubbing, cyanosis,   edema  MUSCULOSKELETAL:- No muscle tenderness, Muscle tone normal, No joint tenderness, no Joint swelling,  Joint ROM –normal   SKIN-no rash, no lesion    Labs radiologic and other test : all reviewed and interpreted :   07-21    133<L>  |  103  |  12  ----------------------------<  109<H>  3.8   |  26  |  0.74    Ca    8.2<L>      21 Jul 2023 08:39  Phos  2.0     07-21  Mg     2.1     07-21    TPro  6.9  /  Alb  2.2<L>  /  TBili  1.4<H>  /  DBili  x   /  AST  115<H>  /  ALT  135<H>  /  AlkPhos  110  07-21                            10.8   8.10  )-----------( 154      ( 21 Jul 2023 08:39 )             30.9       CARDIAC MARKERS ( 21 Jul 2023 08:39 )  x     / x     / 258 U/L / x     / x      CARDIAC MARKERS ( 21 Jul 2023 07:16 )  x     / x     / 140 U/L / x     / x            LIVER FUNCTIONS - ( 21 Jul 2023 08:39 )  Alb: 2.2 g/dL / Pro: 6.9 gm/dL / ALK PHOS: 110 U/L / ALT: 135 U/L / AST: 115 U/L / GGT: x           Parasitemia, Blood (07.21.23 @ 07:16)    Culture Results:   Babesia species  by [facility] Stain  Parasitemia = 5.0 %   Specimen Source: .Blood None      Blood Parasites- Travel Associated (07.19.23 @ 12:41)    Specimen Source: .Blood None   Culture Results:   NEGATIVE for Plasmodium antigens. Microscopy is performed for  confirmation.  This test does not detect the presence of Babesia species.  If Babesiosis is suspected, please order test for Babesia PCR: Babesia  microti PCR Bld  ************************************************************  Babesia species  by [facility] Stain  Parasitemia = 7.4%    MR MRCP w/wo IV Cont (07.21.23 @ 11:46) >    IMPRESSION:    1.4 cm lesion in the right hepatic lobe as described above; differential   includes a hemangioma, however a follow-up pre and post IV contrast MRI   of the abdomen is recommended in 3-6 months to assess for stability.    Trace perihepatic ascites.    Small amount of nonspecific pericholecystic fluid.    < end of copied text >       CT Chest No Cont (07.20.23 @ 11:46) >  IMPRESSION:    Mild septal thickening in both lungs, which may be due to pulmonary   edema/volume overload, or known infection.    Clusters small nodules in the right middle lobe, likely related to distal   airway impaction. Recommend CT chest in 3 months to assess for clearance.      Xray Chest 1 View- PORTABLE-Urgent (07.19.23 @ 16:01) >  IMPRESSION: Clear lungs.     US Abdomen Upper Quadrant Right (07.19.23 @ 15:59) >  IMPRESSION:  Nonspecific, mild, focal anterior gallbladder wall thickening/edema   located between the liver and gallbladder.  Remainder of the gallbladder   appears within normal limits. No gallstones are seen. Negative   sonographic Almeida sign. No intra or extrahepatic biliary ductal   dilatation. Finding is nonspecific.  Question hepatitis or other   secondary cause for mild gallbladder wall thickening. Recommend clinical   correlation.  Short interval repeat exam or MRI/MRCP can be obtained for   additional evaluation as clinically indicated.    There is a 1.5 x 1.3 x 1.3 cm echogenic lesion in the right hepatic lobe,   presumably a hepatic hemangioma, however not characterized on the current   exam. If clinically indicated, abdominal MR without and with IV contrast   may be pursued for further evaluation, when clinically feasible.     CT Abdomen and Pelvis w/ IV Cont (07.19.23 @ 14:54) >  IMPRESSION:  No CT evidence for gallstones. Apparent nonspecific trace pericholecystic   fluid. If clinically indicated, gallbladder ultrasoundmay be pursued for   further evaluation.    Indeterminate 1.4 cm hypodense lesion in the right hepatic lobe. If   clinically indicated, nonemergent abdominal MR without and with IV   contrast may be pursued for further evaluation.      RECENT CULTURES:  Culture Results:   NEGATIVE for Plasmodium antigens. Microscopy is performed for  confirmation.  This test does not detect the presence of Babesia species.  If Babesiosis is suspected, please order test for Babesia PCR: Babesia  microti PCR Bld  ************************************************************  Babesia species  by [facility] Stain  Parasitemia = 7.4% (07.19.23 @ 12:41)        Cardiac testing : reviewed   EKG   12 Lead ECG (07.19.23 @ 15:54) >  Ventricular Rate 81 BPM  QTC Calculation(Bazett) 439 ms   Normal sinus rhythm  Possible Left atrial enlargement  Borderline ECG  No previous ECGs available    - TroponinI hsT: <-14.17      Procedures :     Devices:     Current medications:  acetaminophen     Tablet .. 650 milliGRAM(s) Oral every 6 hours PRN  aluminum hydroxide/magnesium hydroxide/simethicone Suspension 30 milliLiter(s) Oral every 4 hours PRN  atovaquone  Suspension 750 milliGRAM(s) Oral two times a day  azithromycin  IVPB 500 milliGRAM(s) IV Intermittent every 24 hours  benzocaine/menthol Lozenge 1 Lozenge Oral four times a day PRN  clonazePAM  Tablet 0.5 milliGRAM(s) Oral at bedtime PRN  cyanocobalamin 1000 MICROGram(s) Oral daily  magnesium oxide 400 milliGRAM(s) Oral at bedtime  melatonin 3 milliGRAM(s) Oral at bedtime PRN  multivitamin 1 Tablet(s) Oral daily  ondansetron Injectable 4 milliGRAM(s) IV Push every 8 hours PRN  saccharomyces boulardii 250 milliGRAM(s) Oral two times a day  sodium chloride 0.9% with potassium chloride 20 mEq/L 1000 milliLiter(s) IV Continuous <Continuous>

## 2023-07-21 NOTE — PROGRESS NOTE ADULT - SUBJECTIVE AND OBJECTIVE BOX
Date of service: 07-21-23 @ 13:51    pt seen and examined  febrile to 102.7  has fatigue, malaise  feels weak    ROS: denies dizziness, no HA, no SOB or cough, no abdominal pain, no diarrhea or constipation; no dysuria, no urinary frequency, no legs pain, no rashes    MEDICATIONS  (STANDING):  atovaquone  Suspension 750 milliGRAM(s) Oral two times a day  azithromycin  IVPB 500 milliGRAM(s) IV Intermittent every 24 hours  cyanocobalamin 1000 MICROGram(s) Oral daily  magnesium oxide 400 milliGRAM(s) Oral at bedtime  multivitamin 1 Tablet(s) Oral daily  saccharomyces boulardii 250 milliGRAM(s) Oral two times a day    Vital Signs Last 24 Hrs  T(C): 37.4 (21 Jul 2023 11:42), Max: 40.5 (20 Jul 2023 16:01)  T(F): 99.3 (21 Jul 2023 11:42), Max: 104.9 (20 Jul 2023 16:01)  HR: 84 (21 Jul 2023 07:19) (73 - 97)  BP: 97/64 (21 Jul 2023 07:19) (91/54 - 109/60)  BP(mean): --  RR: 18 (21 Jul 2023 07:19) (18 - 18)  SpO2: 91% (21 Jul 2023 07:19) (91% - 94%)    Parameters below as of 21 Jul 2023 07:19  Patient On (Oxygen Delivery Method): room air    PE:  Constitutional: NAD   HEENT: NC/AT, EOMI, PERRLA, conjunctivae clear; ears and nose atraumatic; pharynx benign  Neck: supple; thyroid not palpable  Back: no tenderness  Respiratory: respiratory effort normal; clear to auscultation  Cardiovascular: S1S2 regular, no murmurs  Abdomen: soft, not tender, not distended, positive BS; liver and spleen WNL  Genitourinary: no suprapubic tenderness  Lymphatic: no LN palpable  Musculoskeletal: no muscle tenderness, no joint swelling or tenderness  Extremities: no pedal edema  Neurological/ Psychiatric: AxOx3, Judgement and insight normal;  moving all extremities  Skin: no rashes; no palpable lesions    Labs: all available labs reviewed                        10.8   8.10  )-----------( 154      ( 21 Jul 2023 08:39 )             30.9     07-21    133<L>  |  103  |  12  ----------------------------<  109<H>  3.8   |  26  |  0.74    Ca    8.2<L>      21 Jul 2023 08:39  Phos  2.0     07-21  Mg     2.1     07-21    TPro  6.9  /  Alb  2.2<L>  /  TBili  1.4<H>  /  DBili  x   /  AST  115<H>  /  ALT  135<H>  /  AlkPhos  110  07-21         Urinalysis Basic - ( 20 Jul 2023 06:14 )    Color: x / Appearance: x / SG: x / pH: x  Gluc: 104 mg/dL / Ketone: x  / Bili: x / Urobili: x   Blood: x / Protein: x / Nitrite: x   Leuk Esterase: x / RBC: x / WBC x   Sq Epi: x / Non Sq Epi: x / Bacteria: x    Parasitemia, Blood (07.20.23 @ 06:14)   Culture Results:   Babesia species   by [facility] Stain   Parasitemia = 5.0%  Culture - Blood (07.19.23 @ 15:38)   Specimen Source: .Blood None  Culture Results:   No growth at 24 hours    Culture Results:   NEGATIVE for Plasmodium antigens. Microscopy is performed for  confirmation.    < end of copied text >  This test does not detect the presence of Babesia species.  If Babesiosis is suspected, please order test for Babesia PCR: Babesia  microti PCR Bld  ************************************************************  Babesia species  by [facility] Stain  Parasitemia = 7.4% (07-19 @ 12:41)    Radiology: all available radiological tests reviewed  < from: CT Abdomen and Pelvis w/ IV Cont (07.19.23 @ 14:54) >    ACC: [id number] EXAM:  CT ABDOMEN AND PELVIS IC   ORDERED BY: JAY CHA     PROCEDURE DATE:  07/19/2023          INTERPRETATION:  CLINICAL INFORMATION: Abdominal pain    COMPARISON: None.    CONTRAST/COMPLICATIONS:  IV Contrast: Omnipaque 350  90cc administered   10 cc discarded  Oral Contrast: NONE  Complications: None reported at time of study completion    PROCEDURE:  CT of the Abdomen and Pelvis was performed.  Sagittal and coronal reformats were performed.    FINDINGS:  LOWER CHEST: Small bilateral atelectasis.    LIVER: Indeterminate 1.4 cm hypodense lesion in the right hepatic lobe.   If clinically indicated, nonemergent abdominal MR without and with IV   contrast may be pursued for further evaluation.  BILE DUCTS: Normal caliber.  GALLBLADDER: No CT evidence for gallstones. Apparent nonspecific trace   pericholecystic fluid. If clinically indicated, gallbladder ultrasound   may be pursued for further evaluation.  SPLEEN: Within normal limits.  PANCREAS: Within normal limits.  ADRENALS: Within normal limits.  KIDNEYS/URETERS: Within normal limits except for a small hypodense lesion   in the left kidney, too small to characterize.    BLADDER: Within normal limits.  REPRODUCTIVE ORGANS: The uterus and adnexa appear grossly unremarkable.    BOWEL: No bowel obstruction or grossly thickened bowel wall. Appendix   within normal limits.  PERITONEUM: No free air or ascites.  VESSELS: Within normal limits.  RETROPERITONEUM/LYMPH NODES: No lymphadenopathy.  ABDOMINAL WALL: Tiny fat-containing umbilical hernia.  BONES: Mild degenerative spondylosis. Grade 1 anterolisthesis at L4-5.    IMPRESSION:  No CT evidence for gallstones. Apparent nonspecific trace pericholecystic   fluid. If clinically indicated, gallbladder ultrasoundmay be pursued for   further evaluation.    Indeterminate 1.4 cm hypodense lesion in the right hepatic lobe. If   clinically indicated, nonemergent abdominal MR without and with IV   contrast may be pursued for further evaluation.      ACC: [id number] EXAM:  US ABDOMEN RT UPR QUADRANT   ORDERED BY: JAY CHA     PROCEDURE DATE:  07/19/2023          INTERPRETATION:  CLINICAL INFORMATION: Right upper quadrant pain, fever    COMPARISON: CT of the abdomen and pelvis from 7/19/2023    TECHNIQUE: Sonography of the right upper quadrant.    FINDINGS:  Liver: Within normal limits in echogenicity and size. There is a 1.5 x   1.3 x 1.3 cm echogenic lesion in the right hepatic lobe, presumably a   hepatic hemangioma, however not characterized on the current exam. The   visualized main portal vein appears patent with normal direction of flow.  Bile ducts: Normal caliber. Common bile duct measures 2 mm.  Gallbladder: No gallstones are seen within the gallbladder.  Negative   sonographic Almeida's sign. There is mild, focal anterior gallbladder wall   thickening/edema, up to 4.5 mm,  located between the gallbladder and the   liver, as was suggested on recent prior CT. The remainder of the   gallbladder wall appears within normal limits.  Pancreas: Visualized portions are within normal limits.  Right kidney: 11.6 cm. No hydronephrosis.  Ascites: None.  Aorta/IVC: Visualized portions are within normal limits.    IMPRESSION:  Nonspecific, mild, focal anterior gallbladder wall thickening/edema   located between the liver and gallbladder.  Remainder of the gallbladder   appears within normal limits. No gallstones are seen. Negative   sonographic Almeida sign. No intra or extrahepatic biliary ductal   dilatation. Finding is nonspecific.  Question hepatitis or other   secondary cause for mild gallbladder wall thickening. Recommend clinical   correlation.  Short interval repeat exam or MRI/MRCP can be obtained for   additional evaluation as clinically indicated.    There is a 1.5 x 1.3 x 1.3 cm echogenic lesion in the right hepatic lobe,   presumably a hepatic hemangioma, however not characterized on the current   exam. If clinically indicated, abdominal MR without and with IV contrast   may be pursued for further evaluation, when clinically feasible.        Advanced directives addressed: full resuscitation

## 2023-07-21 NOTE — PROGRESS NOTE ADULT - ASSESSMENT
52 year old female PMHx significant for ulcerative colitis presents to the [facility] on 7/19/23  for further evaluation and management of symptoms of progressively worsening generalized weakness, malaise/fatigue, myalgias, subjective fevers (TMax at home 103.7'F), generalized abdominal discomfort and decreased PO intake which began on Friday (7/14).  The patient states that her symptoms progressed despite taking Tylenol and Motrin prn. Of note the patient reportedly tested negative for Influenza and COVID-19. Upon triage the patient met sepsis criteria => Vital signs in triage => BP 86/53, HR 70/min, RR 27/min, SpO2 97% on room air. In the ED CCM was consulted to further evaluate.     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 IV hydration  ~f/u PAN C+S , Lyme Vise IgG/IgM AB Reflex, hepatitis   ~ Babesia microti - PCR positive   ~f/u Enrlichea/Anaplasma - PCR  ~cont. Atovaquone 750mg po q12h  ~cont. Azithromycin 500mg IVPB daily  ~ID consult noted - 10 days course   ~cont. anti-pyretics prn  - tylenol alternating with ibuprophen    Cough , possible mild fluids overload  - cepacol  - CT chest - mild septal thickening in both lungs - ? edema, vs infection   - as per ID team  - stop IV fluids    1.4 cm right hepatic lesion with transaminitis   - MRCP - likely hemangioma , o/p repeat test   - CT abd - noted  - check hepatitis panel     Hypovolumic hyponatremia, Hypoalbuminemia  - replace   - Na improving    Hypocalcemia  - check vit D wnl    UC - add probiotics    Anxiety ~cont. Clonazepam 0.5mg po qhs prn    ADHD  ~will hold Dextroamphetamine-Amphetamine (takes 10mg po daily)  ~takes Dextroamphetamine-Amphetamine ER 20mg daily prn    Vte ppx  ~cont. SCDs for now due to low Platelets    Dispo - abx, antipyretics

## 2023-07-21 NOTE — PROGRESS NOTE ADULT - ASSESSMENT
52 year old female PMHx significant for ulcerative colitis presents to the [facility] for further evaluation and management of symptoms of progressively worsening generalized weakness, malaise/fatigue, myalgias, subjective fevers (TMax at home 103.7'F), generalized abdominal discomfort and decreased PO intake which began on Friday (7/14).  The patient states that her symptoms progressed despite taking Tylenol and Motrin prn. Of note the patient reportedly tested negative for Influenza and COVID-19. Upon triage the patient met sepsis criteria => Vital signs in triage => BP 86/53, HR 70/min, RR 27/min, SpO2 97% on room air. In the ED CCM was consulted to further evaluate.  Labs => PLT 75, Peripheral smear notable for intra-erythrocytic ring form parasites, INR 1.6, Alb 2.6, TBili 1.9, AST//186, CT ABD/Pelvis w/ IV Contrast => No CT evidence for gallstones. Apparent nonspecific trace pericholecystic fluid. US Abdomen => Nonspecific, mild, focal anterior gallbladder wall thickening/edema located between the liver and gallbladder.  Remainder of the gallbladder appears within normal limits. No gallstones are seen. Negative sonographic Almeida sign. No intra or extrahepatic biliary ductal dilatation. Question hepatitis or other secondary cause for mild gallbladder wall thickening.  In the ED the patient received Acetaminophen 650mg PO x 1, Ceftriaxone 1g IVPB x 1, Azithromycin 500mg IVPB x 1, Ketorolac 30mg IVP x 1, and NS x 1.9L.     1. Fever. Babesiosis. Tick borne illness.   - imaging reviewed   - fever, anemia, transaminitis, thrombocytopenia d/t babesia  - 7.4 % parasitemia c/w severe disease - improving - down to 5%; f/u percent parasitemia  - on atovaquone 750mg BID #2-3, on azithromycin 500mg daily #3  - will require 10 day course  - lyme screen (-), f/u ehrlichia, anaplasma pcr  - monitor temps  - tolerating abx well so far; no side effects noted  - reason for abx use and side effects reviewed with patient  - supportive care  - fu cbc    2. other issues - care per medicine

## 2023-07-21 NOTE — PROVIDER CONTACT NOTE (CRITICAL VALUE NOTIFICATION) - TEST AND RESULT REPORTED:
Bebisia PCR positive
blood parasites (july 19)- positive, negative for plasminogen antigen  PARASITENIA- 7.4%
"Paracytemia blood , BCBC detected."

## 2023-07-22 LAB
A PHAGOCYTOPH DNA BLD QL NAA+PROBE: NEGATIVE — SIGNIFICANT CHANGE UP
ALBUMIN SERPL ELPH-MCNC: 1.6 G/DL — LOW (ref 3.3–5)
ALP SERPL-CCNC: 98 U/L — SIGNIFICANT CHANGE UP (ref 40–120)
ALT FLD-CCNC: 111 U/L — HIGH (ref 12–78)
ANION GAP SERPL CALC-SCNC: 5 MMOL/L — SIGNIFICANT CHANGE UP (ref 5–17)
AST SERPL-CCNC: 113 U/L — HIGH (ref 15–37)
BASOPHILS # BLD AUTO: 0.02 K/UL — SIGNIFICANT CHANGE UP (ref 0–0.2)
BASOPHILS NFR BLD AUTO: 0.3 % — SIGNIFICANT CHANGE UP (ref 0–2)
BILIRUB SERPL-MCNC: 1 MG/DL — SIGNIFICANT CHANGE UP (ref 0.2–1.2)
BUN SERPL-MCNC: 12 MG/DL — SIGNIFICANT CHANGE UP (ref 7–23)
CALCIUM SERPL-MCNC: 7.5 MG/DL — LOW (ref 8.5–10.1)
CHLORIDE SERPL-SCNC: 100 MMOL/L — SIGNIFICANT CHANGE UP (ref 96–108)
CK SERPL-CCNC: 169 U/L — SIGNIFICANT CHANGE UP (ref 26–192)
CO2 SERPL-SCNC: 27 MMOL/L — SIGNIFICANT CHANGE UP (ref 22–31)
CREAT SERPL-MCNC: 0.71 MG/DL — SIGNIFICANT CHANGE UP (ref 0.5–1.3)
CRP SERPL-MCNC: 169 MG/L — HIGH
CULTURE RESULTS: SIGNIFICANT CHANGE UP
E CHAFFEENSIS DNA BLD QL NAA+PROBE: NEGATIVE — SIGNIFICANT CHANGE UP
E EWINGII DNA SPEC QL NAA+PROBE: NEGATIVE — SIGNIFICANT CHANGE UP
EGFR: 102 ML/MIN/1.73M2 — SIGNIFICANT CHANGE UP
EHRLICHIA DNA SPEC QL NAA+PROBE: NEGATIVE — SIGNIFICANT CHANGE UP
EOSINOPHIL # BLD AUTO: 0.03 K/UL — SIGNIFICANT CHANGE UP (ref 0–0.5)
EOSINOPHIL NFR BLD AUTO: 0.4 % — SIGNIFICANT CHANGE UP (ref 0–6)
GLUCOSE SERPL-MCNC: 110 MG/DL — HIGH (ref 70–99)
HCT VFR BLD CALC: 25.5 % — LOW (ref 34.5–45)
HGB BLD-MCNC: 9 G/DL — LOW (ref 11.5–15.5)
IMM GRANULOCYTES NFR BLD AUTO: 1 % — HIGH (ref 0–0.9)
LYMPHOCYTES # BLD AUTO: 1.84 K/UL — SIGNIFICANT CHANGE UP (ref 1–3.3)
LYMPHOCYTES # BLD AUTO: 27 % — SIGNIFICANT CHANGE UP (ref 13–44)
MAGNESIUM SERPL-MCNC: 1.9 MG/DL — SIGNIFICANT CHANGE UP (ref 1.6–2.6)
MANUAL SMEAR VERIFICATION: SIGNIFICANT CHANGE UP
MCHC RBC-ENTMCNC: 29 PG — SIGNIFICANT CHANGE UP (ref 27–34)
MCHC RBC-ENTMCNC: 35.3 GM/DL — SIGNIFICANT CHANGE UP (ref 32–36)
MCV RBC AUTO: 82.3 FL — SIGNIFICANT CHANGE UP (ref 80–100)
MONOCYTES # BLD AUTO: 1.15 K/UL — HIGH (ref 0–0.9)
MONOCYTES NFR BLD AUTO: 16.9 % — HIGH (ref 2–14)
NEUTROPHILS # BLD AUTO: 3.71 K/UL — SIGNIFICANT CHANGE UP (ref 1.8–7.4)
NEUTROPHILS NFR BLD AUTO: 54.4 % — SIGNIFICANT CHANGE UP (ref 43–77)
OB PNL STL: NEGATIVE — SIGNIFICANT CHANGE UP
PHOSPHATE SERPL-MCNC: 1.4 MG/DL — LOW (ref 2.5–4.5)
PLAT MORPH BLD: SIGNIFICANT CHANGE UP
PLATELET # BLD AUTO: 182 K/UL — SIGNIFICANT CHANGE UP (ref 150–400)
POTASSIUM SERPL-MCNC: 3.5 MMOL/L — SIGNIFICANT CHANGE UP (ref 3.5–5.3)
POTASSIUM SERPL-SCNC: 3.5 MMOL/L — SIGNIFICANT CHANGE UP (ref 3.5–5.3)
PROCALCITONIN SERPL-MCNC: 4.44 NG/ML — HIGH (ref 0.02–0.1)
PROT SERPL-MCNC: 5.5 GM/DL — LOW (ref 6–8.3)
RBC # BLD: 3.1 M/UL — LOW (ref 3.8–5.2)
RBC # FLD: 14.6 % — HIGH (ref 10.3–14.5)
RBC BLD AUTO: SIGNIFICANT CHANGE UP
SODIUM SERPL-SCNC: 132 MMOL/L — LOW (ref 135–145)
SPECIMEN SOURCE: SIGNIFICANT CHANGE UP
WBC # BLD: 6.82 K/UL — SIGNIFICANT CHANGE UP (ref 3.8–10.5)
WBC # FLD AUTO: 6.82 K/UL — SIGNIFICANT CHANGE UP (ref 3.8–10.5)

## 2023-07-22 PROCEDURE — 99232 SBSQ HOSP IP/OBS MODERATE 35: CPT

## 2023-07-22 RX ORDER — SODIUM,POTASSIUM PHOSPHATES 278-250MG
1 POWDER IN PACKET (EA) ORAL
Refills: 0 | Status: COMPLETED | OUTPATIENT
Start: 2023-07-22 | End: 2023-07-23

## 2023-07-22 RX ORDER — ALBUTEROL 90 UG/1
1 AEROSOL, METERED ORAL EVERY 4 HOURS
Refills: 0 | Status: DISCONTINUED | OUTPATIENT
Start: 2023-07-22 | End: 2023-08-01

## 2023-07-22 RX ORDER — CEFTRIAXONE 500 MG/1
1000 INJECTION, POWDER, FOR SOLUTION INTRAMUSCULAR; INTRAVENOUS ONCE
Refills: 0 | Status: COMPLETED | OUTPATIENT
Start: 2023-07-22 | End: 2023-07-22

## 2023-07-22 RX ORDER — ALBUTEROL 90 UG/1
2.5 AEROSOL, METERED ORAL EVERY 8 HOURS
Refills: 0 | Status: DISCONTINUED | OUTPATIENT
Start: 2023-07-22 | End: 2023-08-01

## 2023-07-22 RX ORDER — CEFTRIAXONE 500 MG/1
1000 INJECTION, POWDER, FOR SOLUTION INTRAMUSCULAR; INTRAVENOUS EVERY 24 HOURS
Refills: 0 | Status: COMPLETED | OUTPATIENT
Start: 2023-07-23 | End: 2023-07-27

## 2023-07-22 RX ORDER — CEFTRIAXONE 500 MG/1
INJECTION, POWDER, FOR SOLUTION INTRAMUSCULAR; INTRAVENOUS
Refills: 0 | Status: COMPLETED | OUTPATIENT
Start: 2023-07-22 | End: 2023-07-28

## 2023-07-22 RX ADMIN — Medication 650 MILLIGRAM(S): at 14:38

## 2023-07-22 RX ADMIN — ALBUTEROL 2.5 MILLIGRAM(S): 90 AEROSOL, METERED ORAL at 15:26

## 2023-07-22 RX ADMIN — PREGABALIN 1000 MICROGRAM(S): 225 CAPSULE ORAL at 09:42

## 2023-07-22 RX ADMIN — ALBUTEROL 2.5 MILLIGRAM(S): 90 AEROSOL, METERED ORAL at 20:40

## 2023-07-22 RX ADMIN — ATOVAQUONE 750 MILLIGRAM(S): 750 SUSPENSION ORAL at 21:13

## 2023-07-22 RX ADMIN — Medication 650 MILLIGRAM(S): at 15:08

## 2023-07-22 RX ADMIN — Medication 200 MILLIGRAM(S): at 11:31

## 2023-07-22 RX ADMIN — Medication 1 TABLET(S): at 10:08

## 2023-07-22 RX ADMIN — Medication 62.5 MILLIMOLE(S): at 14:39

## 2023-07-22 RX ADMIN — Medication 650 MILLIGRAM(S): at 21:14

## 2023-07-22 RX ADMIN — Medication 1 TABLET(S): at 21:13

## 2023-07-22 RX ADMIN — Medication 1 TABLET(S): at 09:43

## 2023-07-22 RX ADMIN — Medication 400 MILLIGRAM(S): at 05:28

## 2023-07-22 RX ADMIN — Medication 200 MILLIGRAM(S): at 05:24

## 2023-07-22 RX ADMIN — Medication 200 MILLIGRAM(S): at 17:31

## 2023-07-22 RX ADMIN — CEFTRIAXONE 1000 MILLIGRAM(S): 500 INJECTION, POWDER, FOR SOLUTION INTRAMUSCULAR; INTRAVENOUS at 09:42

## 2023-07-22 RX ADMIN — Medication 250 MILLIGRAM(S): at 09:43

## 2023-07-22 RX ADMIN — AZITHROMYCIN 255 MILLIGRAM(S): 500 TABLET, FILM COATED ORAL at 14:40

## 2023-07-22 RX ADMIN — Medication 250 MILLIGRAM(S): at 21:13

## 2023-07-22 RX ADMIN — ATOVAQUONE 750 MILLIGRAM(S): 750 SUSPENSION ORAL at 09:46

## 2023-07-22 RX ADMIN — MAGNESIUM OXIDE 400 MG ORAL TABLET 400 MILLIGRAM(S): 241.3 TABLET ORAL at 21:13

## 2023-07-22 NOTE — PROGRESS NOTE ADULT - SUBJECTIVE AND OBJECTIVE BOX
Subjective:  Chief complain :  Generalized Malaise, Fatigue, Myalgias, Fever, Abdominal Pain, Nausea, decreased PO intake.    HPI:  52 year old female PMHx significant for ulcerative colitis presents to the [facility] on 7/19/23  for further evaluation and management of symptoms of progressively worsening generalized weakness, malaise/fatigue, myalgias, subjective fevers (TMax at home 103.7'F), generalized abdominal discomfort and decreased PO intake which began on Friday (7/14).  The patient states that her symptoms progressed despite taking Tylenol and Motrin prn. Of note the patient reportedly tested negative for Influenza and COVID-19. Upon triage the patient met sepsis criteria => Vital signs in triage => BP 86/53, HR 70/min, RR 27/min, SpO2 97% on room air. In the ED CCM was consulted to further evaluate.     Labs => PLT 75, Peripheral smear notable for intra-erythrocytic ring form parasites, INR 1.6, Alb 2.6, TBili 1.9, AST//186, CT ABD/Pelvis w/ IV Contrast => No CT evidence for gallstones. Apparent nonspecific trace pericholecystic fluid. If clinically indicated, gallbladder ultrasound may be pursued for further evaluation. Indeterminate 1.4 cm hypodense lesion in the right hepatic lobe. If clinically indicated, nonemergent abdominal MR without and with IV contrast may be pursued for further evaluation.  US Abdomen => Nonspecific, mild, focal anterior gallbladder wall thickening/edema located between the liver and gallbladder.  Remainder of the gallbladder appears within normal limits. No gallstones are seen. Negative sonographic Almeida sign. No intra or extrahepatic biliary ductal dilatation. Finding is nonspecific.  Question hepatitis or other secondary cause for mild gallbladder wall thickening. Recommend clinical correlation.  Short interval repeat exam or MRI/MRCP can be obtained for additional evaluation as clinically indicated. There is a 1.5 x 1.3 x 1.3 cm echogenic lesion in the right hepatic lobe, presumably a hepatic hemangioma, however not characterized on the current exam. If clinically indicated, abdominal MR without and with IV contrast may be pursued for further evaluation, when clinically feasible.   In the ED the patient received Acetaminophen 650mg PO x 1, Ceftriaxone 1g IVPB x 1, Azithromycin 500mg IVPB x 1, Ketorolac 30mg IVP x 1, and NS x 1.9L.      7/20 - Patient seen and examined at bedside earlier today, + generalized chills, aches, poor po intake, generalized abdominal discomfort   7/21 - pt seen and examined in am, + febrile, + muscle aches, denies cp, + cough persist, denies cp, abdominal pain , + lose bm  7/22 - no events, still febrile, + cough, dyspnea overnight , denies cp, abdominal pain, eager to go home    Review of system- Rest of the review of system are negative except mentioned in HPI    Vital sings reviewed for last 24 h  T(C): 38 (07-22-23 @ 07:26), Max: 39.4 (07-21-23 @ 15:45)  T(F): 100.4 (07-22-23 @ 07:26), Max: 102.9 (07-21-23 @ 15:45)  HR: 77 (07-22-23 @ 07:26) (70 - 95)  BP: 94/59 (07-22-23 @ 07:26) (87/57 - 116/62)  RR: 18 (07-22-23 @ 07:26) (18 - 19)  SpO2: 94% (07-22-23 @ 07:26) (92% - 98%)      Physical exam:   General : NAD, appear to be of stated age , well groomed   NERVOUS SYSTEM:  Alert & Oriented X3, non- focal exam, Motor Strength 5/5 B/L upper and lower extremities; DTRs 2+ intact and symmetric  HEAD:  Atraumatic, Normocephalic  EYES: EOMI, PERRLA, conjunctiva and sclera clear  HEENT: Moist mucous membranes, Supple neck , No JVD  CHEST: Clear to auscultation bilaterally; No rales, no rhonchi, no wheezing  HEART: Regular rate and rhythm; No murmurs, no rubs or gallops  ABDOMEN: Soft, Non-tender, Non-distended; Bowel sounds present, no guarding , no peritoneal irritation   GENITOURINARY- Voiding, no suprapubic tenderness  EXTREMITIES:  2+ Peripheral Pulses, No clubbing, cyanosis,   edema  MUSCULOSKELETAL:- No muscle tenderness, Muscle tone normal, No joint tenderness, no Joint swelling,  Joint ROM –normal   SKIN-no rash, no lesion    Labs radiologic and other test : all reviewed and interpreted :   07-22    132<L>  |  100  |  12  ----------------------------<  110<H>  3.5   |  27  |  0.71    Ca    7.5<L>      22 Jul 2023 06:45  Phos  1.4     07-22  Mg     1.9     07-22    TPro  5.5<L>  /  Alb  1.6<L>  /  TBili  1.0  /  DBili  x   /  AST  113<H>  /  ALT  111<H>  /  AlkPhos  98  07-22                            9.0    6.82  )-----------( 182      ( 22 Jul 2023 06:45 )             25.5       CARDIAC MARKERS ( 22 Jul 2023 06:45 )  x     / x     / 169 U/L / x     / x      CARDIAC MARKERS ( 21 Jul 2023 08:39 )  x     / x     / 258 U/L / x     / x      CARDIAC MARKERS ( 21 Jul 2023 07:16 )  x     / x     / 140 U/L / x     / x            LIVER FUNCTIONS - ( 22 Jul 2023 06:45 )  Alb: 1.6 g/dL / Pro: 5.5 gm/dL / ALK PHOS: 98 U/L / ALT: 111 U/L / AST: 113 U/L / GGT: x                 Parasitemia, Blood (07.22.23 @ 06:45)    Specimen Source: .Blood None   Culture Results:   Babesia species  by [facility] Stain  Parasitemia = 2.3 %        Parasitemia, Blood (07.21.23 @ 07:16)    Culture Results:   Babesia species  by [facility] Stain  Parasitemia = 5.0 %   Specimen Source: .Blood None      Blood Parasites- Travel Associated (07.19.23 @ 12:41)    Specimen Source: .Blood None   Culture Results:   NEGATIVE for Plasmodium antigens. Microscopy is performed for  confirmation.  This test does not detect the presence of Babesia species.  If Babesiosis is suspected, please order test for Babesia PCR: Babesia  microti PCR Bld  ************************************************************  Babesia species  by [facility] Stain  Parasitemia = 7.4%    MR MRCP w/wo IV Cont (07.21.23 @ 11:46) >    IMPRESSION:    1.4 cm lesion in the right hepatic lobe as described above; differential   includes a hemangioma, however a follow-up pre and post IV contrast MRI   of the abdomen is recommended in 3-6 months to assess for stability.    Trace perihepatic ascites.    Small amount of nonspecific pericholecystic fluid.    < end of copied text >       CT Chest No Cont (07.20.23 @ 11:46) >  IMPRESSION:    Mild septal thickening in both lungs, which may be due to pulmonary   edema/volume overload, or known infection.    Clusters small nodules in the right middle lobe, likely related to distal   airway impaction. Recommend CT chest in 3 months to assess for clearance.      Xray Chest 1 View- PORTABLE-Urgent (07.19.23 @ 16:01) >  IMPRESSION: Clear lungs.     US Abdomen Upper Quadrant Right (07.19.23 @ 15:59) >  IMPRESSION:  Nonspecific, mild, focal anterior gallbladder wall thickening/edema   located between the liver and gallbladder.  Remainder of the gallbladder   appears within normal limits. No gallstones are seen. Negative   sonographic Almeida sign. No intra or extrahepatic biliary ductal   dilatation. Finding is nonspecific.  Question hepatitis or other   secondary cause for mild gallbladder wall thickening. Recommend clinical   correlation.  Short interval repeat exam or MRI/MRCP can be obtained for   additional evaluation as clinically indicated.    There is a 1.5 x 1.3 x 1.3 cm echogenic lesion in the right hepatic lobe,   presumably a hepatic hemangioma, however not characterized on the current   exam. If clinically indicated, abdominal MR without and with IV contrast   may be pursued for further evaluation, when clinically feasible.     CT Abdomen and Pelvis w/ IV Cont (07.19.23 @ 14:54) >  IMPRESSION:  No CT evidence for gallstones. Apparent nonspecific trace pericholecystic   fluid. If clinically indicated, gallbladder ultrasoundmay be pursued for   further evaluation.    Indeterminate 1.4 cm hypodense lesion in the right hepatic lobe. If   clinically indicated, nonemergent abdominal MR without and with IV   contrast may be pursued for further evaluation.      RECENT CULTURES:  Culture Results:   NEGATIVE for Plasmodium antigens. Microscopy is performed for  confirmation.  This test does not detect the presence of Babesia species.  If Babesiosis is suspected, please order test for Babesia PCR: Babesia  microti PCR Bld  ************************************************************  Babesia species  by Giemsa Stain  Parasitemia = 7.4% (07.19.23 @ 12:41)        Cardiac testing : reviewed   EKG   12 Lead ECG (07.19.23 @ 15:54) >  Ventricular Rate 81 BPM  QTC Calculation(Bazett) 439 ms   Normal sinus rhythm  Possible Left atrial enlargement  Borderline ECG  No previous ECGs available    - TroponinI hsT: <-14.17      Procedures :     Devices:     Current medications:  acetaminophen     Tablet .. 650 milliGRAM(s) Oral every 6 hours PRN  aluminum hydroxide/magnesium hydroxide/simethicone Suspension 30 milliLiter(s) Oral every 4 hours PRN  atovaquone  Suspension 750 milliGRAM(s) Oral two times a day  azithromycin  IVPB 500 milliGRAM(s) IV Intermittent every 24 hours  benzocaine/menthol Lozenge 1 Lozenge Oral four times a day PRN  clonazePAM  Tablet 0.5 milliGRAM(s) Oral at bedtime PRN  cyanocobalamin 1000 MICROGram(s) Oral daily  magnesium oxide 400 milliGRAM(s) Oral at bedtime  melatonin 3 milliGRAM(s) Oral at bedtime PRN  multivitamin 1 Tablet(s) Oral daily  ondansetron Injectable 4 milliGRAM(s) IV Push every 8 hours PRN  saccharomyces boulardii 250 milliGRAM(s) Oral two times a day  sodium chloride 0.9% with potassium chloride 20 mEq/L 1000 milliLiter(s) IV Continuous <Continuous>

## 2023-07-22 NOTE — PROGRESS NOTE ADULT - ASSESSMENT
52 year old female PMHx significant for ulcerative colitis presents to the [facility] on 7/19/23  for further evaluation and management of symptoms of progressively worsening generalized weakness, malaise/fatigue, myalgias, subjective fevers (TMax at home 103.7'F), generalized abdominal discomfort and decreased PO intake which began on Friday (7/14).  The patient states that her symptoms progressed despite taking Tylenol and Motrin prn. Of note the patient reportedly tested negative for Influenza and COVID-19. Upon triage the patient met sepsis criteria => Vital signs in triage => BP 86/53, HR 70/min, RR 27/min, SpO2 97% on room air. In the ED CCM was consulted to further evaluate.     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 IV hydration  ~f/u PAN C+S , Lyme Vise IgG/IgM AB Reflex, hepatitis   ~ Babesia microti - PCR positive   ~f/u Enrlichea/Anaplasma - PCR   ~cont. Atovaquone 750mg po q12h  ~cont. Azithromycin 500mg IVPB daily  ~ID consult noted - 10 days course   ~cont. anti-pyretics prn  - tylenol alternating with ibuprofen    Cough , possible mild fluids overload vs early Pneumonia   - Cepacol  - CT chest - mild septal thickening in both lungs - ? edema, vs infection   - as per ID team  - stop IV fluids  - 7/22 still febrile will add Ceftriaxone  - albuterol inhalers      1.4 cm right hepatic lesion with transaminitis   - MRCP - likely hemangioma , o/p repeat test   - CT abd - noted  - check hepatitis panel     Hypovolumic hyponatremia, Hypoalbuminemia  - replace   - Na improving    Hypocalcemia  - check vit D wnl    [facility] - add probiotics    Anxiety ~cont. Clonazepam 0.5mg po qhs prn    ADHD  ~will hold Dextroamphetamine-Amphetamine (takes 10mg po daily)  ~takes Dextroamphetamine-Amphetamine ER 20mg daily prn    Vte ppx  ~cont. SCDs for now due to low Platelets    Dispo - abx, antipyretics

## 2023-07-23 LAB
ADD ON TEST-SPECIMEN IN LAB: SIGNIFICANT CHANGE UP
ALBUMIN SERPL ELPH-MCNC: 1.6 G/DL — LOW (ref 3.3–5)
ALP SERPL-CCNC: 105 U/L — SIGNIFICANT CHANGE UP (ref 40–120)
ALT FLD-CCNC: 121 U/L — HIGH (ref 12–78)
ANION GAP SERPL CALC-SCNC: 6 MMOL/L — SIGNIFICANT CHANGE UP (ref 5–17)
ANISOCYTOSIS BLD QL: SLIGHT — SIGNIFICANT CHANGE UP
AST SERPL-CCNC: 128 U/L — HIGH (ref 15–37)
BASE EXCESS BLDA CALC-SCNC: 4.3 MMOL/L — HIGH (ref -2–3)
BASOPHILS # BLD AUTO: 0 K/UL — SIGNIFICANT CHANGE UP (ref 0–0.2)
BASOPHILS NFR BLD AUTO: 0 % — SIGNIFICANT CHANGE UP (ref 0–2)
BILIRUB SERPL-MCNC: 1.1 MG/DL — SIGNIFICANT CHANGE UP (ref 0.2–1.2)
BLOOD GAS COMMENTS ARTERIAL: SIGNIFICANT CHANGE UP
BUN SERPL-MCNC: 9 MG/DL — SIGNIFICANT CHANGE UP (ref 7–23)
CALCIUM SERPL-MCNC: 7.5 MG/DL — LOW (ref 8.5–10.1)
CHLORIDE SERPL-SCNC: 100 MMOL/L — SIGNIFICANT CHANGE UP (ref 96–108)
CO2 SERPL-SCNC: 29 MMOL/L — SIGNIFICANT CHANGE UP (ref 22–31)
CREAT SERPL-MCNC: 0.66 MG/DL — SIGNIFICANT CHANGE UP (ref 0.5–1.3)
CRP SERPL-MCNC: 174 MG/L — HIGH
CULTURE RESULTS: SIGNIFICANT CHANGE UP
EGFR: 105 ML/MIN/1.73M2 — SIGNIFICANT CHANGE UP
EOSINOPHIL # BLD AUTO: 0 K/UL — SIGNIFICANT CHANGE UP (ref 0–0.5)
EOSINOPHIL NFR BLD AUTO: 0 % — SIGNIFICANT CHANGE UP (ref 0–6)
GLUCOSE SERPL-MCNC: 119 MG/DL — HIGH (ref 70–99)
HAPTOGLOB SERPL-MCNC: <20 MG/DL — LOW (ref 34–200)
HCO3 BLDA-SCNC: 27 MMOL/L — SIGNIFICANT CHANGE UP (ref 21–28)
HCT VFR BLD CALC: 24 % — LOW (ref 34.5–45)
HGB BLD-MCNC: 8.5 G/DL — LOW (ref 11.5–15.5)
HYPOCHROMIA BLD QL: SLIGHT — SIGNIFICANT CHANGE UP
LYMPHOCYTES # BLD AUTO: 0.81 K/UL — LOW (ref 1–3.3)
LYMPHOCYTES # BLD AUTO: 15 % — SIGNIFICANT CHANGE UP (ref 13–44)
MAGNESIUM SERPL-MCNC: 1.9 MG/DL — SIGNIFICANT CHANGE UP (ref 1.6–2.6)
MANUAL SMEAR VERIFICATION: SIGNIFICANT CHANGE UP
MCHC RBC-ENTMCNC: 28.9 PG — SIGNIFICANT CHANGE UP (ref 27–34)
MCHC RBC-ENTMCNC: 35.4 GM/DL — SIGNIFICANT CHANGE UP (ref 32–36)
MCV RBC AUTO: 81.6 FL — SIGNIFICANT CHANGE UP (ref 80–100)
MONOCYTES # BLD AUTO: 0.38 K/UL — SIGNIFICANT CHANGE UP (ref 0–0.9)
MONOCYTES NFR BLD AUTO: 7 % — SIGNIFICANT CHANGE UP (ref 2–14)
NEUTROPHILS # BLD AUTO: 4.17 K/UL — SIGNIFICANT CHANGE UP (ref 1.8–7.4)
NEUTROPHILS NFR BLD AUTO: 70 % — SIGNIFICANT CHANGE UP (ref 43–77)
NEUTS BAND # BLD: 7 % — SIGNIFICANT CHANGE UP (ref 0–8)
NRBC # BLD: 0 /100 — SIGNIFICANT CHANGE UP (ref 0–0)
NRBC # BLD: SIGNIFICANT CHANGE UP /100 WBCS (ref 0–0)
NT-PROBNP SERPL-SCNC: 331 PG/ML — HIGH (ref 0–125)
PCO2 BLDA: 34 MMHG — SIGNIFICANT CHANGE UP (ref 32–45)
PH BLDA: 7.51 — HIGH (ref 7.35–7.45)
PHOSPHATE SERPL-MCNC: 2.8 MG/DL — SIGNIFICANT CHANGE UP (ref 2.5–4.5)
PLAT MORPH BLD: NORMAL — SIGNIFICANT CHANGE UP
PLATELET # BLD AUTO: 216 K/UL — SIGNIFICANT CHANGE UP (ref 150–400)
PLATELET COUNT - ESTIMATE: NORMAL — SIGNIFICANT CHANGE UP
PO2 BLDA: 61 MMHG — LOW (ref 83–108)
POLYCHROMASIA BLD QL SMEAR: SLIGHT — SIGNIFICANT CHANGE UP
POTASSIUM SERPL-MCNC: 3 MMOL/L — LOW (ref 3.5–5.3)
POTASSIUM SERPL-SCNC: 3 MMOL/L — LOW (ref 3.5–5.3)
PROCALCITONIN SERPL-MCNC: 3.03 NG/ML — HIGH (ref 0.02–0.1)
PROT SERPL-MCNC: 5.8 GM/DL — LOW (ref 6–8.3)
RBC # BLD: 2.94 M/UL — LOW (ref 3.8–5.2)
RBC # FLD: 14.5 % — SIGNIFICANT CHANGE UP (ref 10.3–14.5)
RBC BLD AUTO: SIGNIFICANT CHANGE UP
SAO2 % BLDA: 93 % — LOW (ref 94–98)
SODIUM SERPL-SCNC: 135 MMOL/L — SIGNIFICANT CHANGE UP (ref 135–145)
SPECIMEN SOURCE: SIGNIFICANT CHANGE UP
VARIANT LYMPHS # BLD: 1 % — SIGNIFICANT CHANGE UP (ref 0–6)
WBC # BLD: 5.42 K/UL — SIGNIFICANT CHANGE UP (ref 3.8–10.5)
WBC # FLD AUTO: 5.42 K/UL — SIGNIFICANT CHANGE UP (ref 3.8–10.5)

## 2023-07-23 PROCEDURE — 71275 CT ANGIOGRAPHY CHEST: CPT | Mod: 26

## 2023-07-23 PROCEDURE — 99232 SBSQ HOSP IP/OBS MODERATE 35: CPT

## 2023-07-23 PROCEDURE — 93970 EXTREMITY STUDY: CPT | Mod: 26

## 2023-07-23 RX ORDER — FUROSEMIDE 40 MG
20 TABLET ORAL ONCE
Refills: 0 | Status: COMPLETED | OUTPATIENT
Start: 2023-07-23 | End: 2023-07-23

## 2023-07-23 RX ORDER — POTASSIUM CHLORIDE 20 MEQ
20 PACKET (EA) ORAL
Refills: 0 | Status: COMPLETED | OUTPATIENT
Start: 2023-07-23 | End: 2023-07-23

## 2023-07-23 RX ORDER — BUDESONIDE AND FORMOTEROL FUMARATE DIHYDRATE 160; 4.5 UG/1; UG/1
2 AEROSOL RESPIRATORY (INHALATION)
Refills: 0 | Status: DISCONTINUED | OUTPATIENT
Start: 2023-07-23 | End: 2023-08-01

## 2023-07-23 RX ADMIN — Medication 1 TABLET(S): at 09:10

## 2023-07-23 RX ADMIN — ATOVAQUONE 750 MILLIGRAM(S): 750 SUSPENSION ORAL at 21:56

## 2023-07-23 RX ADMIN — BUDESONIDE AND FORMOTEROL FUMARATE DIHYDRATE 2 PUFF(S): 160; 4.5 AEROSOL RESPIRATORY (INHALATION) at 20:48

## 2023-07-23 RX ADMIN — Medication 250 MILLIGRAM(S): at 21:57

## 2023-07-23 RX ADMIN — Medication 0.5 MILLIGRAM(S): at 21:59

## 2023-07-23 RX ADMIN — Medication 200 MILLIGRAM(S): at 17:26

## 2023-07-23 RX ADMIN — Medication 650 MILLIGRAM(S): at 13:51

## 2023-07-23 RX ADMIN — Medication 250 MILLIGRAM(S): at 09:10

## 2023-07-23 RX ADMIN — Medication 1 TABLET(S): at 21:57

## 2023-07-23 RX ADMIN — BENZOCAINE AND MENTHOL 1 LOZENGE: 5; 1 LIQUID ORAL at 04:00

## 2023-07-23 RX ADMIN — ALBUTEROL 2.5 MILLIGRAM(S): 90 AEROSOL, METERED ORAL at 08:16

## 2023-07-23 RX ADMIN — Medication 200 MILLIGRAM(S): at 12:10

## 2023-07-23 RX ADMIN — CEFTRIAXONE 1000 MILLIGRAM(S): 500 INJECTION, POWDER, FOR SOLUTION INTRAMUSCULAR; INTRAVENOUS at 07:49

## 2023-07-23 RX ADMIN — Medication 650 MILLIGRAM(S): at 14:53

## 2023-07-23 RX ADMIN — Medication 20 MILLIEQUIVALENT(S): at 14:37

## 2023-07-23 RX ADMIN — Medication 650 MILLIGRAM(S): at 03:40

## 2023-07-23 RX ADMIN — ALBUTEROL 2.5 MILLIGRAM(S): 90 AEROSOL, METERED ORAL at 15:46

## 2023-07-23 RX ADMIN — Medication 200 MILLIGRAM(S): at 00:53

## 2023-07-23 RX ADMIN — AZITHROMYCIN 255 MILLIGRAM(S): 500 TABLET, FILM COATED ORAL at 14:37

## 2023-07-23 RX ADMIN — MAGNESIUM OXIDE 400 MG ORAL TABLET 400 MILLIGRAM(S): 241.3 TABLET ORAL at 21:56

## 2023-07-23 RX ADMIN — ATOVAQUONE 750 MILLIGRAM(S): 750 SUSPENSION ORAL at 09:09

## 2023-07-23 RX ADMIN — Medication 200 MILLIGRAM(S): at 23:08

## 2023-07-23 RX ADMIN — Medication 200 MILLIGRAM(S): at 06:19

## 2023-07-23 RX ADMIN — Medication 20 MILLIEQUIVALENT(S): at 12:10

## 2023-07-23 RX ADMIN — ALBUTEROL 2.5 MILLIGRAM(S): 90 AEROSOL, METERED ORAL at 20:48

## 2023-07-23 RX ADMIN — Medication 20 MILLIGRAM(S): at 10:56

## 2023-07-23 RX ADMIN — Medication 20 MILLIEQUIVALENT(S): at 09:56

## 2023-07-23 RX ADMIN — PREGABALIN 1000 MICROGRAM(S): 225 CAPSULE ORAL at 09:09

## 2023-07-23 NOTE — PROGRESS NOTE ADULT - SUBJECTIVE AND OBJECTIVE BOX
Subjective:  Chief complain :  Generalized Malaise, Fatigue, Myalgias, Fever, Abdominal Pain, Nausea, decreased PO intake.    HPI:  52 year old female PMHx significant for ulcerative colitis presents to the [facility] on 7/19/23  for further evaluation and management of symptoms of progressively worsening generalized weakness, malaise/fatigue, myalgias, subjective fevers (TMax at home 103.7'F), generalized abdominal discomfort and decreased PO intake which began on Friday (7/14).  The patient states that her symptoms progressed despite taking Tylenol and Motrin prn. Of note the patient reportedly tested negative for Influenza and COVID-19. Upon triage the patient met sepsis criteria => Vital signs in triage => BP 86/53, HR 70/min, RR 27/min, SpO2 97% on room air. In the ED CCM was consulted to further evaluate.     Labs => PLT 75, Peripheral smear notable for intra-erythrocytic ring form parasites, INR 1.6, Alb 2.6, TBili 1.9, AST//186, CT ABD/Pelvis w/ IV Contrast => No CT evidence for gallstones. Apparent nonspecific trace pericholecystic fluid. If clinically indicated, gallbladder ultrasound may be pursued for further evaluation. Indeterminate 1.4 cm hypodense lesion in the right hepatic lobe. If clinically indicated, nonemergent abdominal MR without and with IV contrast may be pursued for further evaluation.  US Abdomen => Nonspecific, mild, focal anterior gallbladder wall thickening/edema located between the liver and gallbladder.  Remainder of the gallbladder appears within normal limits. No gallstones are seen. Negative sonographic Almeida sign. No intra or extrahepatic biliary ductal dilatation. Finding is nonspecific.  Question hepatitis or other secondary cause for mild gallbladder wall thickening. Recommend clinical correlation.  Short interval repeat exam or MRI/MRCP can be obtained for additional evaluation as clinically indicated. There is a 1.5 x 1.3 x 1.3 cm echogenic lesion in the right hepatic lobe, presumably a hepatic hemangioma, however not characterized on the current exam. If clinically indicated, abdominal MR without and with IV contrast may be pursued for further evaluation, when clinically feasible.   In the ED the patient received Acetaminophen 650mg PO x 1, Ceftriaxone 1g IVPB x 1, Azithromycin 500mg IVPB x 1, Ketorolac 30mg IVP x 1, and NS x 1.9L.      7/20 - Patient seen and examined at bedside earlier today, + generalized chills, aches, poor po intake, generalized abdominal discomfort   7/21 - pt seen and examined in am, + febrile, + muscle aches, denies cp, + cough persist, denies cp, abdominal pain , + lose bm  7/22 - no events, still febrile, + cough, dyspnea overnight , denies cp, abdominal pain, eager to go home  7/23 - + hypoxia overnight on 4 L now, + cough, + dyspnea, + pleuritic chest pain, denies abdominal pain, plan discussed    Review of system- Rest of the review of system are negative except mentioned in HPI    Vital sings reviewed for last 24 h  T(C): 37.8 (07-23-23 @ 15:33), Max: 39.4 (07-22-23 @ 20:48)  T(F): 100 (07-23-23 @ 15:33), Max: 102.9 (07-22-23 @ 20:48)  HR: 80 (07-23-23 @ 15:49) (68 - 96)  BP: 93/56 (07-23-23 @ 15:33) (93/56 - 117/66)  RR: 18 (07-23-23 @ 15:33) (18 - 19)  SpO2: 93% (07-23-23 @ 15:33) (93% - 100%)        Physical exam:   General : NAD, appear to be of stated age , well groomed   NERVOUS SYSTEM:  Alert & Oriented X3, non- focal exam, Motor Strength 5/5 B/L upper and lower extremities; DTRs 2+ intact and symmetric  HEAD:  Atraumatic, Normocephalic  EYES: EOMI, PERRLA, conjunctiva and sclera clear  HEENT: Moist mucous membranes, Supple neck , No JVD  CHEST: Clear to auscultation bilaterally; No rales, no rhonchi, no wheezing  HEART: Regular rate and rhythm; No murmurs, no rubs or gallops  ABDOMEN: Soft, Non-tender, Non-distended; Bowel sounds present, no guarding , no peritoneal irritation   GENITOURINARY- Voiding, no suprapubic tenderness  EXTREMITIES:  2+ Peripheral Pulses, No clubbing, cyanosis,   edema  MUSCULOSKELETAL:- No muscle tenderness, Muscle tone normal, No joint tenderness, no Joint swelling,  Joint ROM –normal   SKIN-no rash, no lesion    Labs radiologic and other test : all reviewed and interpreted :   07-23    135  |  100  |  9   ----------------------------<  119<H>  3.0<L>   |  29  |  0.66    Ca    7.5<L>      23 Jul 2023 06:46  Phos  2.8     07-23  Mg     1.9     07-23    TPro  5.8<L>  /  Alb  1.6<L>  /  TBili  1.1  /  DBili  x   /  AST  128<H>  /  ALT  121<H>  /  AlkPhos  105  07-23                            8.5    5.42  )-----------( 216      ( 23 Jul 2023 06:46 )             24.0       CARDIAC MARKERS ( 22 Jul 2023 06:45 )  x     / x     / 169 U/L / x     / x            LIVER FUNCTIONS - ( 23 Jul 2023 06:46 )  Alb: 1.6 g/dL / Pro: 5.8 gm/dL / ALK PHOS: 105 U/L / ALT: 121 U/L / AST: 128 U/L / GGT: x           Parasitemia, Blood (07.23.23 @ 06:46)    Specimen Source: .Blood None   Culture Results:   Babesia species  by [facility] Stain  Parasitemia = 1%      Parasitemia, Blood (07.22.23 @ 06:45)    Specimen Source: .Blood None   Culture Results:   Babesia species  by [facility] Stain  Parasitemia = 2.3 %        Parasitemia, Blood (07.21.23 @ 07:16)    Culture Results:   Babesia species  by [facility] Stain  Parasitemia = 5.0 %   Specimen Source: .Blood None      Blood Parasites- Travel Associated (07.19.23 @ 12:41)    Specimen Source: .Blood None   Culture Results:   NEGATIVE for Plasmodium antigens. Microscopy is performed for  confirmation.  This test does not detect the presence of Babesia species.  If Babesiosis is suspected, please order test for Babesia PCR: Babesia  microti PCR Bld  ************************************************************  Babesia species  by [facility] Stain  Parasitemia = 7.4%    MR MRCP w/wo IV Cont (07.21.23 @ 11:46) >    IMPRESSION:    1.4 cm lesion in the right hepatic lobe as described above; differential   includes a hemangioma, however a follow-up pre and post IV contrast MRI   of the abdomen is recommended in 3-6 months to assess for stability.    Trace perihepatic ascites.    Small amount of nonspecific pericholecystic fluid.    < from: CT Angio Chest PE Protocol w/ IV Cont (07.23.23 @ 15:05) >  LUNGS AND AIRWAYS: Patent central airways.  New extensive consolidation   throughout the basilar segments of the bilateral lower lobes, right   greater than left. Calcified granuloma in the superior segment of the   right lower lobe. New mild patchy nodular opacity at the right lung apex.   New patchy groundglass and consolidative opacity in the infrahilar right   middle lobe and perihilar left upper lobe.  PLEURA: Mild bilateral layering pleural effusions. No pneumothorax or   pneumomediastinum.  MEDIASTINUM AND REECE: No lymphadenopathy.  VESSELS: There is no evidence of filling defect in the first, second, or   third order branches of the pulmonary arteries. The pulmonary trunk and   main pulmonary arteries are unremarkable. The thoracic aorta and great   vessels are unremarkable.  HEART: Heart size is normal. No pericardial effusion.  CHEST WALL AND LOWER NECK: Within normal limits.  VISUALIZED UPPER ABDOMEN: Within normal limits.  BONES: Within normal limits.    IMPRESSION:  1. New bilateral lower lobe consolidation and additional scattered   consolidative opacities, as described. Recommend plain film follow-up.  2. No evidence of pulmonary embolism.    < end of copied text >         CT Chest No Cont (07.20.23 @ 11:46) >  IMPRESSION:    Mild septal thickening in both lungs, which may be due to pulmonary   edema/volume overload, or known infection.    Clusters small nodules in the right middle lobe, likely related to distal   airway impaction. Recommend CT chest in 3 months to assess for clearance.      Xray Chest 1 View- PORTABLE-Urgent (07.19.23 @ 16:01) >  IMPRESSION: Clear lungs.     US Abdomen Upper Quadrant Right (07.19.23 @ 15:59) >  IMPRESSION:  Nonspecific, mild, focal anterior gallbladder wall thickening/edema   located between the liver and gallbladder.  Remainder of the gallbladder   appears within normal limits. No gallstones are seen. Negative   sonographic Almeida sign. No intra or extrahepatic biliary ductal   dilatation. Finding is nonspecific.  Question hepatitis or other   secondary cause for mild gallbladder wall thickening. Recommend clinical   correlation.  Short interval repeat exam or MRI/MRCP can be obtained for   additional evaluation as clinically indicated.    There is a 1.5 x 1.3 x 1.3 cm echogenic lesion in the right hepatic lobe,   presumably a hepatic hemangioma, however not characterized on the current   exam. If clinically indicated, abdominal MR without and with IV contrast   may be pursued for further evaluation, when clinically feasible.     CT Abdomen and Pelvis w/ IV Cont (07.19.23 @ 14:54) >  IMPRESSION:  No CT evidence for gallstones. Apparent nonspecific trace pericholecystic   fluid. If clinically indicated, gallbladder ultrasoundmay be pursued for   further evaluation.    Indeterminate 1.4 cm hypodense lesion in the right hepatic lobe. If   clinically indicated, nonemergent abdominal MR without and with IV   contrast may be pursued for further evaluation.      RECENT CULTURES:  Culture Results:   NEGATIVE for Plasmodium antigens. Microscopy is performed for  confirmation.  This test does not detect the presence of Babesia species.  If Babesiosis is suspected, please order test for Babesia PCR: Babesia  microti PCR Bld  ************************************************************  Babesia species  by [facility] Stain  Parasitemia = 7.4% (07.19.23 @ 12:41)        Cardiac testing : reviewed   EKG   12 Lead ECG (07.19.23 @ 15:54) >  Ventricular Rate 81 BPM  QTC Calculation(Bazett) 439 ms   Normal sinus rhythm  Possible Left atrial enlargement  Borderline ECG  No previous ECGs available    - TroponinI hsT: <-14.17      Procedures :     Devices:     Current medications:  acetaminophen     Tablet .. 650 milliGRAM(s) Oral every 6 hours PRN  aluminum hydroxide/magnesium hydroxide/simethicone Suspension 30 milliLiter(s) Oral every 4 hours PRN  atovaquone  Suspension 750 milliGRAM(s) Oral two times a day  azithromycin  IVPB 500 milliGRAM(s) IV Intermittent every 24 hours  benzocaine/menthol Lozenge 1 Lozenge Oral four times a day PRN  clonazePAM  Tablet 0.5 milliGRAM(s) Oral at bedtime PRN  cyanocobalamin 1000 MICROGram(s) Oral daily  magnesium oxide 400 milliGRAM(s) Oral at bedtime  melatonin 3 milliGRAM(s) Oral at bedtime PRN  multivitamin 1 Tablet(s) Oral daily  ondansetron Injectable 4 milliGRAM(s) IV Push every 8 hours PRN  saccharomyces boulardii 250 milliGRAM(s) Oral two times a day  sodium chloride 0.9% with potassium chloride 20 mEq/L 1000 milliLiter(s) IV Continuous <Continuous>

## 2023-07-23 NOTE — CONSULT NOTE ADULT - SUBJECTIVE AND OBJECTIVE BOX
Patient is a 52y old  Female who presents with a chief complaint of Generalized Malaise, Fatigue, Myalgias, Fever, Abdominal Pain, Nausea, decreased PO intake. (2023 14:46)      HPI:  52 year old female PMHx significant for ulcerative colitis presents to the [facility] for further evaluation and management of symptoms of progressively worsening generalized weakness, malaise/fatigue, myalgias, subjective fevers (TMax at home 103.7'F), generalized abdominal discomfort and decreased PO intake which began on Friday ().  The patient states that her symptoms progressed despite taking Tylenol and Motrin prn. Of note the patient reportedly tested negative for Influenza and COVID-19. Upon triage the patient met sepsis criteria => Vital signs in triage => BP 86/53, HR 70/min, RR 27/min, SpO2 97% on room air. In the ED CCM was consulted to further evaluate.   Found to have Babesiosis   CT Chest revealed findings concerning of mild pulmonary edema (likely non cardiogenic)/parasite related     PAST MEDICAL & SURGICAL HISTORY:  Ulcerative colitis      H/O:   x3          PREVIOUS DIAGNOSTIC TESTING:      MEDICATIONS  (STANDING):  albuterol    0.083% 2.5 milliGRAM(s) Nebulizer every 8 hours  albuterol    90 MICROgram(s) HFA Inhaler 1 Puff(s) Inhalation every 4 hours  atovaquone  Suspension 750 milliGRAM(s) Oral two times a day  azithromycin  IVPB 500 milliGRAM(s) IV Intermittent every 24 hours  cefTRIAXone Injectable.      cefTRIAXone Injectable. 1000 milliGRAM(s) IV Push every 24 hours  cyanocobalamin 1000 MICROGram(s) Oral daily  guaiFENesin Oral Liquid (Sugar-Free) 200 milliGRAM(s) Oral every 6 hours  magnesium oxide 400 milliGRAM(s) Oral at bedtime  multivitamin 1 Tablet(s) Oral daily  potassium chloride    Tablet ER 20 milliEquivalent(s) Oral every 2 hours  potassium phosphate / sodium phosphate Tablet (K-PHOS No. 2) 1 Tablet(s) Oral two times a day  saccharomyces boulardii 250 milliGRAM(s) Oral two times a day    MEDICATIONS  (PRN):  acetaminophen     Tablet .. 650 milliGRAM(s) Oral every 6 hours PRN Temp greater or equal to 38C (100.4F), Mild Pain (1 - 3)  aluminum hydroxide/magnesium hydroxide/simethicone Suspension 30 milliLiter(s) Oral every 4 hours PRN Dyspepsia  benzocaine/menthol Lozenge 1 Lozenge Oral four times a day PRN Sore Throat  clonazePAM  Tablet 0.5 milliGRAM(s) Oral at bedtime PRN Insomnia  ibuprofen  Tablet. 400 milliGRAM(s) Oral every 6 hours PRN Temp greater or equal to 38.5C (101.3F)  melatonin 3 milliGRAM(s) Oral at bedtime PRN Insomnia  ondansetron Injectable 4 milliGRAM(s) IV Push every 8 hours PRN Nausea and/or Vomiting      FAMILY HISTORY:  No pertinent family history in first degree relatives        SOCIAL HISTORY:  ***    REVIEW OF SYSTEM:  cough, dyspnea     Vital Signs Last 24 Hrs  T(C): 37.6 (2023 07:53), Max: 39.4 (2023 20:48)  T(F): 99.7 (2023 07:53), Max: 102.9 (2023 20:48)  HR: 72 (2023 10:48) (68 - 100)  BP: 96/63 (2023 10:48) (96/63 - 133/100)  BP(mean): --  RR: 18 (2023 09:43) (18 - 19)  SpO2: 94% (2023 09:43) (94% - 100%)    Parameters below as of 2023 09:43  Patient On (Oxygen Delivery Method): nasal cannula  O2 Flow (L/min): 2.5      I&O's Summary    PHYSICAL EXAM  General Appearance: cooperative, no acute distress,   HEENT: PERRL, conjunctiva clear, EOM's intact, non injected pharynx, no exudate, TM   normal  Neck: Supple, , no adenopathy, thyroid: not enlarged, no carotid bruit or JVD  Back: Symmetric, no  tenderness,no soft tissue tenderness  Lungs: diminished at the bases, cough is present with exhalation   Heart: Regular rate and rhythm, S1, S2 normal, no murmur, rub or gallop  Abdomen: Soft, non-tender, bowel sounds active , no hepatosplenomegaly  Extremities: no cyanosis or edema, no joint swelling  Skin: Skin color, texture normal, no rashes   Neurologic: Alert and oriented X3 , cranial nerves intact, sensory and motor normal,    ECG:    LABS:                          8.5    5.42  )-----------( 216      ( 2023 06:46 )             24.0         135  |  100  |  9   ----------------------------<  119<H>  3.0<L>   |  29  |  0.66    Ca    7.5<L>      2023 06:46  Phos  2.8       Mg     1.9         TPro  5.8<L>  /  Alb  1.6<L>  /  TBili  1.1  /  DBili  x   /  AST  128<H>  /  ALT  121<H>  /  AlkPhos  105      CARDIAC MARKERS ( 2023 06:45 )  x     / x     / 169 U/L / x     / x                Urinalysis Basic - ( 2023 06:46 )    Color: x / Appearance: x / SG: x / pH: x  Gluc: 119 mg/dL / Ketone: x  / Bili: x / Urobili: x   Blood: x / Protein: x / Nitrite: x   Leuk Esterase: x / RBC: x / WBC x   Sq Epi: x / Non Sq Epi: x / Bacteria: x            RADIOLOGY & ADDITIONAL STUDIES:

## 2023-07-23 NOTE — CONSULT NOTE ADULT - ASSESSMENT
52 year old female PMHx significant for ulcerative colitis presents to the [facility] for further evaluation and management of symptoms of progressively worsening generalized weakness, malaise/fatigue, myalgias, subjective fevers (TMax at home 103.7'F), generalized abdominal discomfort and decreased PO intake which began on Friday (7/14).  The patient states that her symptoms progressed despite taking Tylenol and Motrin prn. Of note the patient reportedly tested negative for Influenza and COVID-19. Upon triage the patient met sepsis criteria => Vital signs in triage => BP 86/53, HR 70/min, RR 27/min, SpO2 97% on room air. In the ED CCM was consulted to further evaluate.   Found to have Babesiosis   CT Chest revealed findings concerning of mild pulmonary edema (likely non cardiogenic)/parasite related   On examination, she has diminished breath sounds at the bases and a cough with exhalation  The cough is likely not related to the babesiosis but if she has underlying pleural irritation/inflammation causing non cardiogenic pulmonary edema (which is mild evidenced by the septal thickening and small effusions bilaterally on CT Chest), it could contribute to the cough.   Agree with Lasix 20mg/d in the interim, can increase dose if BP tolerates  Started Symbicort temporarily  On Atovaquone/Azithromycin   Currently on NC O2 (2.5L  NC O2), follow up ABG   Continue trend BNP, XR  ABG on RA  Will continue to monitor    52 year old female PMHx significant for ulcerative colitis presents to the [facility] for further evaluation and management of symptoms of progressively worsening generalized weakness, malaise/fatigue, myalgias, subjective fevers (TMax at home 103.7'F), generalized abdominal discomfort and decreased PO intake which began on Friday (7/14).  The patient states that her symptoms progressed despite taking Tylenol and Motrin prn. Of note the patient reportedly tested negative for Influenza and COVID-19. Upon triage the patient met sepsis criteria => Vital signs in triage => BP 86/53, HR 70/min, RR 27/min, SpO2 97% on room air. In the ED CCM was consulted to further evaluate.   Found to have Babesiosis   CT Chest revealed findings concerning of mild pulmonary edema (likely non cardiogenic)/parasite related   On examination, she has diminished breath sounds at the bases and a cough with exhalation  The cough is likely not related to the babesiosis but if she has underlying pleural irritation/inflammation causing non cardiogenic pulmonary edema (which is mild evidenced by the septal thickening and small effusions bilaterally on CT Chest), it could contribute to the cough.   Agree with Lasix 20mg/d in the interim, can increase dose if BP tolerates  Started Symbicort temporarily  On Atovaquone/Azithromycin   Currently on NC O2 (2.5L  NC O2), follow up ABG   Continue trend BNP, XR  ABG on RA  Follow up CTA/Dopplers  Will continue to monitor

## 2023-07-23 NOTE — PROGRESS NOTE ADULT - ASSESSMENT
52 year old female PMHx significant for ulcerative colitis presents to the [facility] on 7/19/23  for further evaluation and management of symptoms of progressively worsening generalized weakness, malaise/fatigue, myalgias, subjective fevers (TMax at home 103.7'F), generalized abdominal discomfort and decreased PO intake which began on Friday (7/14).  The patient states that her symptoms progressed despite taking Tylenol and Motrin prn. Of note the patient reportedly tested negative for Influenza and COVID-19. Upon triage the patient met sepsis criteria => Vital signs in triage => BP 86/53, HR 70/min, RR 27/min, SpO2 97% on room air. In the ED CCM was consulted to further evaluate.     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 IV hydration  ~f/u PAN C+S , Lyme Vise IgG/IgM AB Reflex, hepatitis   ~ Babesia microti - PCR positive   ~f/u Enrlichea/Anaplasma - PCR   ~cont. Atovaquone 750mg po q12h  ~cont. Azithromycin 500mg IVPB daily  ~ID consult noted - 10 days course   ~cont. anti-pyretics prn  - tylenol alternating with ibuprofen  - parasitemia 7.4% --> 2--> 1 %     Acute hypoxic respiratory failure due to   probable mild fluids overload  and bilateral early Pneumonia   - CT chest - mild septal thickening in both lungs - ? edema, vs infection   - CTA 7/23 - New bilateral lower lobe consolidation  - doppler LE - no DVT    -  stop IV fluid   - 7/22 still febrile will add Ceftriaxone  - 7/23 - s/p low dose lasix 20 mg   - ABG  noted  - pulmonary consult   - albuterol inhalers      1.4 cm right hepatic lesion with transaminitis   - MRCP - likely hemangioma , o/p repeat test   - CT abd - noted  - check hepatitis panel     Hypovolumic hyponatremia, Hypoalbuminemia  - replace   - Na improving    Hypocalcemia  - check vit D wnl    UC - add probiotics    Anxiety ~cont. Clonazepam 0.5mg po qhs prn    ADHD  ~will hold Dextroamphetamine-Amphetamine (takes 10mg po daily)  ~takes Dextroamphetamine-Amphetamine ER 20mg daily prn    Vte ppx  ~cont. SCDs for now due to low Platelets    Dispo - abx, antipyretics , inpatient monitoring until cleared by ID and pulmonary

## 2023-07-24 LAB
ABO RH CONFIRMATION: SIGNIFICANT CHANGE UP
ALBUMIN SERPL ELPH-MCNC: 1.7 G/DL — LOW (ref 3.3–5)
ALP SERPL-CCNC: 96 U/L — SIGNIFICANT CHANGE UP (ref 40–120)
ALT FLD-CCNC: 132 U/L — HIGH (ref 12–78)
ANION GAP SERPL CALC-SCNC: 6 MMOL/L — SIGNIFICANT CHANGE UP (ref 5–17)
ANISOCYTOSIS BLD QL: SLIGHT — SIGNIFICANT CHANGE UP
AST SERPL-CCNC: 134 U/L — HIGH (ref 15–37)
BASOPHILS # BLD AUTO: 0 K/UL — SIGNIFICANT CHANGE UP (ref 0–0.2)
BASOPHILS NFR BLD AUTO: 0 % — SIGNIFICANT CHANGE UP (ref 0–2)
BILIRUB SERPL-MCNC: 1.1 MG/DL — SIGNIFICANT CHANGE UP (ref 0.2–1.2)
BLD GP AB SCN SERPL QL: SIGNIFICANT CHANGE UP
BUN SERPL-MCNC: 9 MG/DL — SIGNIFICANT CHANGE UP (ref 7–23)
CALCIUM SERPL-MCNC: 7.5 MG/DL — LOW (ref 8.5–10.1)
CHLORIDE SERPL-SCNC: 98 MMOL/L — SIGNIFICANT CHANGE UP (ref 96–108)
CO2 SERPL-SCNC: 28 MMOL/L — SIGNIFICANT CHANGE UP (ref 22–31)
CREAT SERPL-MCNC: 0.73 MG/DL — SIGNIFICANT CHANGE UP (ref 0.5–1.3)
CRP SERPL-MCNC: 165 MG/L — HIGH
CULTURE RESULTS: SIGNIFICANT CHANGE UP
EGFR: 99 ML/MIN/1.73M2 — SIGNIFICANT CHANGE UP
EOSINOPHIL # BLD AUTO: 0 K/UL — SIGNIFICANT CHANGE UP (ref 0–0.5)
EOSINOPHIL NFR BLD AUTO: 0 % — SIGNIFICANT CHANGE UP (ref 0–6)
GLUCOSE SERPL-MCNC: 127 MG/DL — HIGH (ref 70–99)
HCT VFR BLD CALC: 21.3 % — LOW (ref 34.5–45)
HCT VFR BLD CALC: 21.5 % — LOW (ref 34.5–45)
HGB BLD-MCNC: 7.7 G/DL — LOW (ref 11.5–15.5)
HGB BLD-MCNC: 7.7 G/DL — LOW (ref 11.5–15.5)
HYPOCHROMIA BLD QL: SLIGHT — SIGNIFICANT CHANGE UP
LYMPHOCYTES # BLD AUTO: 3.16 K/UL — SIGNIFICANT CHANGE UP (ref 1–3.3)
LYMPHOCYTES # BLD AUTO: 36 % — SIGNIFICANT CHANGE UP (ref 13–44)
MAGNESIUM SERPL-MCNC: 1.9 MG/DL — SIGNIFICANT CHANGE UP (ref 1.6–2.6)
MANUAL SMEAR VERIFICATION: SIGNIFICANT CHANGE UP
MCHC RBC-ENTMCNC: 29.5 PG — SIGNIFICANT CHANGE UP (ref 27–34)
MCHC RBC-ENTMCNC: 36.2 GM/DL — HIGH (ref 32–36)
MCV RBC AUTO: 81.6 FL — SIGNIFICANT CHANGE UP (ref 80–100)
MICROCYTES BLD QL: SLIGHT — SIGNIFICANT CHANGE UP
MONOCYTES # BLD AUTO: 1.67 K/UL — HIGH (ref 0–0.9)
MONOCYTES NFR BLD AUTO: 19 % — HIGH (ref 2–14)
NEUTROPHILS # BLD AUTO: 3.96 K/UL — SIGNIFICANT CHANGE UP (ref 1.8–7.4)
NEUTROPHILS NFR BLD AUTO: 45 % — SIGNIFICANT CHANGE UP (ref 43–77)
NRBC # BLD: 0 /100 — SIGNIFICANT CHANGE UP (ref 0–0)
NRBC # BLD: SIGNIFICANT CHANGE UP /100 WBCS (ref 0–0)
NT-PROBNP SERPL-SCNC: 372 PG/ML — HIGH (ref 0–125)
OVALOCYTES BLD QL SMEAR: SLIGHT — SIGNIFICANT CHANGE UP
PHOSPHATE SERPL-MCNC: 2.4 MG/DL — LOW (ref 2.5–4.5)
PLAT MORPH BLD: NORMAL — SIGNIFICANT CHANGE UP
PLATELET # BLD AUTO: 317 K/UL — SIGNIFICANT CHANGE UP (ref 150–400)
POIKILOCYTOSIS BLD QL AUTO: SLIGHT — SIGNIFICANT CHANGE UP
POLYCHROMASIA BLD QL SMEAR: SLIGHT — SIGNIFICANT CHANGE UP
POTASSIUM SERPL-MCNC: 3.3 MMOL/L — LOW (ref 3.5–5.3)
POTASSIUM SERPL-SCNC: 3.3 MMOL/L — LOW (ref 3.5–5.3)
PROCALCITONIN SERPL-MCNC: 1.91 NG/ML — HIGH (ref 0.02–0.1)
PROT SERPL-MCNC: 6.1 GM/DL — SIGNIFICANT CHANGE UP (ref 6–8.3)
RBC # BLD: 2.61 M/UL — LOW (ref 3.8–5.2)
RBC # FLD: 14.5 % — SIGNIFICANT CHANGE UP (ref 10.3–14.5)
RBC BLD AUTO: ABNORMAL
SARS-COV-2 RNA SPEC QL NAA+PROBE: SIGNIFICANT CHANGE UP
SCHISTOCYTES BLD QL AUTO: SLIGHT — SIGNIFICANT CHANGE UP
SODIUM SERPL-SCNC: 132 MMOL/L — LOW (ref 135–145)
SPECIMEN SOURCE: SIGNIFICANT CHANGE UP
TARGETS BLD QL SMEAR: SLIGHT — SIGNIFICANT CHANGE UP
WBC # BLD: 8.79 K/UL — SIGNIFICANT CHANGE UP (ref 3.8–10.5)
WBC # FLD AUTO: 8.79 K/UL — SIGNIFICANT CHANGE UP (ref 3.8–10.5)

## 2023-07-24 PROCEDURE — 99232 SBSQ HOSP IP/OBS MODERATE 35: CPT

## 2023-07-24 RX ORDER — LIDOCAINE 4 G/100G
1 CREAM TOPICAL ONCE
Refills: 0 | Status: COMPLETED | OUTPATIENT
Start: 2023-07-24 | End: 2023-07-24

## 2023-07-24 RX ORDER — FUROSEMIDE 40 MG
20 TABLET ORAL ONCE
Refills: 0 | Status: COMPLETED | OUTPATIENT
Start: 2023-07-24 | End: 2023-07-24

## 2023-07-24 RX ORDER — SODIUM,POTASSIUM PHOSPHATES 278-250MG
1 POWDER IN PACKET (EA) ORAL THREE TIMES A DAY
Refills: 0 | Status: COMPLETED | OUTPATIENT
Start: 2023-07-24 | End: 2023-07-25

## 2023-07-24 RX ORDER — POTASSIUM CHLORIDE 20 MEQ
20 PACKET (EA) ORAL ONCE
Refills: 0 | Status: COMPLETED | OUTPATIENT
Start: 2023-07-24 | End: 2023-07-24

## 2023-07-24 RX ORDER — ACETAMINOPHEN 500 MG
1000 TABLET ORAL ONCE
Refills: 0 | Status: COMPLETED | OUTPATIENT
Start: 2023-07-24 | End: 2023-07-24

## 2023-07-24 RX ADMIN — ALBUTEROL 2.5 MILLIGRAM(S): 90 AEROSOL, METERED ORAL at 22:09

## 2023-07-24 RX ADMIN — BUDESONIDE AND FORMOTEROL FUMARATE DIHYDRATE 2 PUFF(S): 160; 4.5 AEROSOL RESPIRATORY (INHALATION) at 22:10

## 2023-07-24 RX ADMIN — Medication 400 MILLIGRAM(S): at 15:39

## 2023-07-24 RX ADMIN — Medication 200 MILLIGRAM(S): at 17:47

## 2023-07-24 RX ADMIN — ATOVAQUONE 750 MILLIGRAM(S): 750 SUSPENSION ORAL at 21:57

## 2023-07-24 RX ADMIN — AZITHROMYCIN 255 MILLIGRAM(S): 500 TABLET, FILM COATED ORAL at 14:01

## 2023-07-24 RX ADMIN — Medication 250 MILLIGRAM(S): at 10:38

## 2023-07-24 RX ADMIN — ALBUTEROL 2.5 MILLIGRAM(S): 90 AEROSOL, METERED ORAL at 06:11

## 2023-07-24 RX ADMIN — LIDOCAINE 1 PATCH: 4 CREAM TOPICAL at 23:24

## 2023-07-24 RX ADMIN — ALBUTEROL 2.5 MILLIGRAM(S): 90 AEROSOL, METERED ORAL at 13:52

## 2023-07-24 RX ADMIN — Medication 20 MILLIGRAM(S): at 22:02

## 2023-07-24 RX ADMIN — BUDESONIDE AND FORMOTEROL FUMARATE DIHYDRATE 2 PUFF(S): 160; 4.5 AEROSOL RESPIRATORY (INHALATION) at 08:30

## 2023-07-24 RX ADMIN — Medication 1 TABLET(S): at 13:57

## 2023-07-24 RX ADMIN — Medication 20 MILLIEQUIVALENT(S): at 11:28

## 2023-07-24 RX ADMIN — Medication 650 MILLIGRAM(S): at 05:31

## 2023-07-24 RX ADMIN — Medication 200 MILLIGRAM(S): at 05:31

## 2023-07-24 RX ADMIN — PREGABALIN 1000 MICROGRAM(S): 225 CAPSULE ORAL at 10:37

## 2023-07-24 RX ADMIN — Medication 1 TABLET(S): at 10:37

## 2023-07-24 RX ADMIN — Medication 650 MILLIGRAM(S): at 06:43

## 2023-07-24 RX ADMIN — Medication 200 MILLIGRAM(S): at 11:28

## 2023-07-24 RX ADMIN — Medication 1 TABLET(S): at 22:02

## 2023-07-24 RX ADMIN — MAGNESIUM OXIDE 400 MG ORAL TABLET 400 MILLIGRAM(S): 241.3 TABLET ORAL at 21:57

## 2023-07-24 RX ADMIN — CEFTRIAXONE 1000 MILLIGRAM(S): 500 INJECTION, POWDER, FOR SOLUTION INTRAMUSCULAR; INTRAVENOUS at 10:37

## 2023-07-24 RX ADMIN — ATOVAQUONE 750 MILLIGRAM(S): 750 SUSPENSION ORAL at 10:36

## 2023-07-24 RX ADMIN — Medication 200 MILLIGRAM(S): at 23:24

## 2023-07-24 RX ADMIN — Medication 250 MILLIGRAM(S): at 21:58

## 2023-07-24 NOTE — PROGRESS NOTE ADULT - ASSESSMENT
52 year old female PMHx significant for ulcerative colitis presents to the [facility] for further evaluation and management of symptoms of progressively worsening generalized weakness, malaise/fatigue, myalgias, subjective fevers (TMax at home 103.7'F), generalized abdominal discomfort and decreased PO intake which began on Friday (7/14).  The patient states that her symptoms progressed despite taking Tylenol and Motrin prn. Of note the patient reportedly tested negative for Influenza and COVID-19. Upon triage the patient met sepsis criteria => Vital signs in triage => BP 86/53, HR 70/min, RR 27/min, SpO2 97% on room air. In the ED CCM was consulted to further evaluate.   Found to have Babesiosis   CTA performed 7/23 due to increased O2 requirements- Findings represent bilateral consolidations with mild pleural effusions- consolidations have increased since 7/20.   On examination, she has diminished breath sounds at the bases and a cough with exhalation still  Cough/dyspnea related to pleural irritation/inflammation/consolidative processes and now causing more hypoxemia   Started Symbicort temporarily  On Atovaquone/Azithromycin   ABG reviewed - 7.51/34/61  Continue trend BNP, XR  Dopplers negative  She is still having findings of intravascular hemolysis, new H/H 7.7 today   Will continue to monitor - she is not in acute respiratory distress but with exertion and at rest, she has evidence of desaturations

## 2023-07-24 NOTE — PROGRESS NOTE ADULT - SUBJECTIVE AND OBJECTIVE BOX
Date of service: 07-24-23 @ 15:07    pt seen and examined  having fevers   has cough  found to have pneumonia      ROS: denies dizziness, no HA, no abdominal pain, no diarrhea or constipation; no dysuria, no urinary frequency, no legs pain, no rashes      MEDICATIONS  (STANDING):  albuterol    0.083% 2.5 milliGRAM(s) Nebulizer every 8 hours  albuterol    90 MICROgram(s) HFA Inhaler 1 Puff(s) Inhalation every 4 hours  atovaquone  Suspension 750 milliGRAM(s) Oral two times a day  azithromycin  IVPB 500 milliGRAM(s) IV Intermittent every 24 hours  budesonide 160 MICROgram(s)/formoterol 4.5 MICROgram(s) Inhaler 2 Puff(s) Inhalation two times a day  cefTRIAXone Injectable. 1000 milliGRAM(s) IV Push every 24 hours  cefTRIAXone Injectable.      cyanocobalamin 1000 MICROGram(s) Oral daily  furosemide   Injectable 20 milliGRAM(s) IV Push once  guaiFENesin Oral Liquid (Sugar-Free) 200 milliGRAM(s) Oral every 6 hours  magnesium oxide 400 milliGRAM(s) Oral at bedtime  multivitamin 1 Tablet(s) Oral daily  potassium phosphate / sodium phosphate Tablet (K-PHOS No. 2) 1 Tablet(s) Oral three times a day  saccharomyces boulardii 250 milliGRAM(s) Oral two times a day    Vital Signs Last 24 Hrs  T(C): 36.8 (24 Jul 2023 10:28), Max: 39.1 (24 Jul 2023 06:01)  T(F): 98.2 (24 Jul 2023 10:28), Max: 102.4 (24 Jul 2023 06:01)  HR: 75 (24 Jul 2023 10:28) (73 - 86)  BP: 93/56 (24 Jul 2023 10:28) (93/56 - 107/66)  BP(mean): --  RR: 16 (24 Jul 2023 10:28) (16 - 18)  SpO2: 94% (24 Jul 2023 10:28) (89% - 98%)    Parameters below as of 24 Jul 2023 10:28  Patient On (Oxygen Delivery Method): room air    PE:  Constitutional: NAD   HEENT: NC/AT, EOMI, PERRLA, conjunctivae clear; ears and nose atraumatic; pharynx benign  Neck: supple; thyroid not palpable  Back: no tenderness  Respiratory: decreased breath sounds   Cardiovascular: S1S2 regular, no murmurs  Abdomen: soft, not tender, not distended, positive BS; liver and spleen WNL  Genitourinary: no suprapubic tenderness  Lymphatic: no LN palpable  Musculoskeletal: no muscle tenderness, no joint swelling or tenderness  Extremities: no pedal edema  Neurological/ Psychiatric: AxOx3, Judgement and insight normal;  moving all extremities  Skin: no rashes; no palpable lesions    Labs: all available labs reviewed                                   7.7    x     )-----------( x        ( 24 Jul 2023 08:48 )             21.5     07-24    132<L>  |  98  |  9   ----------------------------<  127<H>  3.3<L>   |  28  |  0.73    Ca    7.5<L>      24 Jul 2023 06:50  Phos  2.4     07-24  Mg     1.9     07-24    TPro  6.1  /  Alb  1.7<L>  /  TBili  1.1  /  DBili  x   /  AST  134<H>  /  ALT  132<H>  /  AlkPhos  96  07-24       Urinalysis Basic - ( 20 Jul 2023 06:14 )    Color: x / Appearance: x / SG: x / pH: x  Gluc: 104 mg/dL / Ketone: x  / Bili: x / Urobili: x   Blood: x / Protein: x / Nitrite: x   Leuk Esterase: x / RBC: x / WBC x   Sq Epi: x / Non Sq Epi: x / Bacteria: x    Parasitemia, Blood (07.20.23 @ 06:14)   Culture Results:   Babesia species   by Giemsa Stain   Parasitemia = 5.0% --> 1.0%   Culture - Blood (07.19.23 @ 15:38)   Specimen Source: .Blood None  Culture Results:   No growth at 24 hours      Radiology: all available radiological tests reviewed  < from: CT Abdomen and Pelvis w/ IV Cont (07.19.23 @ 14:54) >    ACC: [id number] EXAM:  CT ABDOMEN AND PELVIS IC   ORDERED BY: JAY CHA     PROCEDURE DATE:  07/19/2023          INTERPRETATION:  CLINICAL INFORMATION: Abdominal pain    COMPARISON: None.    CONTRAST/COMPLICATIONS:  IV Contrast: Omnipaque 350  90cc administered   10 cc discarded  Oral Contrast: NONE  Complications: None reported at time of study completion    PROCEDURE:  CT of the Abdomen and Pelvis was performed.  Sagittal and coronal reformats were performed.    FINDINGS:  LOWER CHEST: Small bilateral atelectasis.    LIVER: Indeterminate 1.4 cm hypodense lesion in the right hepatic lobe.   If clinically indicated, nonemergent abdominal MR without and with IV   contrast may be pursued for further evaluation.  BILE DUCTS: Normal caliber.  GALLBLADDER: No CT evidence for gallstones. Apparent nonspecific trace   pericholecystic fluid. If clinically indicated, gallbladder ultrasound   may be pursued for further evaluation.  SPLEEN: Within normal limits.  PANCREAS: Within normal limits.  ADRENALS: Within normal limits.  KIDNEYS/URETERS: Within normal limits except for a small hypodense lesion   in the left kidney, too small to characterize.    BLADDER: Within normal limits.  REPRODUCTIVE ORGANS: The uterus and adnexa appear grossly unremarkable.    BOWEL: No bowel obstruction or grossly thickened bowel wall. Appendix   within normal limits.  PERITONEUM: No free air or ascites.  VESSELS: Within normal limits.  RETROPERITONEUM/LYMPH NODES: No lymphadenopathy.  ABDOMINAL WALL: Tiny fat-containing umbilical hernia.  BONES: Mild degenerative spondylosis. Grade 1 anterolisthesis at L4-5.    IMPRESSION:  No CT evidence for gallstones. Apparent nonspecific trace pericholecystic   fluid. If clinically indicated, gallbladder ultrasoundmay be pursued for   further evaluation.    Indeterminate 1.4 cm hypodense lesion in the right hepatic lobe. If   clinically indicated, nonemergent abdominal MR without and with IV   contrast may be pursued for further evaluation.      ACC: [id number] EXAM:  US ABDOMEN RT UPR QUADRANT   ORDERED BY: JAY CHA     PROCEDURE DATE:  07/19/2023          INTERPRETATION:  CLINICAL INFORMATION: Right upper quadrant pain, fever    COMPARISON: CT of the abdomen and pelvis from 7/19/2023    TECHNIQUE: Sonography of the right upper quadrant.    FINDINGS:  Liver: Within normal limits in echogenicity and size. There is a 1.5 x   1.3 x 1.3 cm echogenic lesion in the right hepatic lobe, presumably a   hepatic hemangioma, however not characterized on the current exam. The   visualized main portal vein appears patent with normal direction of flow.  Bile ducts: Normal caliber. Common bile duct measures 2 mm.  Gallbladder: No gallstones are seen within the gallbladder.  Negative   sonographic Almeida's sign. There is mild, focal anterior gallbladder wall   thickening/edema, up to 4.5 mm,  located between the gallbladder and the   liver, as was suggested on recent prior CT. The remainder of the   gallbladder wall appears within normal limits.  Pancreas: Visualized portions are within normal limits.  Right kidney: 11.6 cm. No hydronephrosis.  Ascites: None.  Aorta/IVC: Visualized portions are within normal limits.    IMPRESSION:  Nonspecific, mild, focal anterior gallbladder wall thickening/edema   located between the liver and gallbladder.  Remainder of the gallbladder   appears within normal limits. No gallstones are seen. Negative   sonographic Almeida sign. No intra or extrahepatic biliary ductal   dilatation. Finding is nonspecific.  Question hepatitis or other   secondary cause for mild gallbladder wall thickening. Recommend clinical   correlation.  Short interval repeat exam or MRI/MRCP can be obtained for   additional evaluation as clinically indicated.    There is a 1.5 x 1.3 x 1.3 cm echogenic lesion in the right hepatic lobe,   presumably a hepatic hemangioma, however not characterized on the current   exam. If clinically indicated, abdominal MR without and with IV contrast   may be pursued for further evaluation, when clinically feasible.        Advanced directives addressed: full resuscitation

## 2023-07-24 NOTE — CONSULT NOTE ADULT - REASON FOR ADMISSION
Generalized Malaise, Fatigue, Myalgias, Fever, Abdominal Pain, Nausea, decreased PO intake.

## 2023-07-24 NOTE — PROGRESS NOTE ADULT - SUBJECTIVE AND OBJECTIVE BOX
Subjective:  Chief complain :  Generalized Malaise, Fatigue, Myalgias, Fever, Abdominal Pain, Nausea, decreased PO intake.    HPI:  52 year old female PMHx significant for ulcerative colitis presents to the [facility] on 7/19/23  for further evaluation and management of symptoms of progressively worsening generalized weakness, malaise/fatigue, myalgias, subjective fevers (TMax at home 103.7'F), generalized abdominal discomfort and decreased PO intake which began on Friday (7/14).  The patient states that her symptoms progressed despite taking Tylenol and Motrin prn. Of note the patient reportedly tested negative for Influenza and COVID-19. Upon triage the patient met sepsis criteria => Vital signs in triage => BP 86/53, HR 70/min, RR 27/min, SpO2 97% on room air. In the ED CCM was consulted to further evaluate.     Labs => PLT 75, Peripheral smear notable for intra-erythrocytic ring form parasites, INR 1.6, Alb 2.6, TBili 1.9, AST//186, CT ABD/Pelvis w/ IV Contrast => No CT evidence for gallstones. Apparent nonspecific trace pericholecystic fluid. If clinically indicated, gallbladder ultrasound may be pursued for further evaluation. Indeterminate 1.4 cm hypodense lesion in the right hepatic lobe. If clinically indicated, nonemergent abdominal MR without and with IV contrast may be pursued for further evaluation.  US Abdomen => Nonspecific, mild, focal anterior gallbladder wall thickening/edema located between the liver and gallbladder.  Remainder of the gallbladder appears within normal limits. No gallstones are seen. Negative sonographic Almeida sign. No intra or extrahepatic biliary ductal dilatation. Finding is nonspecific.  Question hepatitis or other secondary cause for mild gallbladder wall thickening. Recommend clinical correlation.  Short interval repeat exam or MRI/MRCP can be obtained for additional evaluation as clinically indicated. There is a 1.5 x 1.3 x 1.3 cm echogenic lesion in the right hepatic lobe, presumably a hepatic hemangioma, however not characterized on the current exam. If clinically indicated, abdominal MR without and with IV contrast may be pursued for further evaluation, when clinically feasible.   In the ED the patient received Acetaminophen 650mg PO x 1, Ceftriaxone 1g IVPB x 1, Azithromycin 500mg IVPB x 1, Ketorolac 30mg IVP x 1, and NS x 1.9L.      7/20 - Patient seen and examined at bedside earlier today, + generalized chills, aches, poor po intake, generalized abdominal discomfort   7/21 - pt seen and examined in am, + febrile, + muscle aches, denies cp, + cough persist, denies cp, abdominal pain , + lose bm  7/22 - no events, still febrile, + cough, dyspnea overnight , denies cp, abdominal pain, eager to go home  7/23 - + hypoxia overnight on 4 L now, + cough, + dyspnea, + pleuritic chest pain, denies abdominal pain, plan discussed  7/24 - events noted episodes of hypoxemia, + cough, + pleuritic chest pain persist, denies abdominal pain, tolerating po intake, agreeable for blood transfusion    Review of system- Rest of the review of system are negative except mentioned in HPI    Vital sings reviewed for last 24 h  T(C): 36.8 (07-24-23 @ 10:28), Max: 39.1 (07-23-23 @ 13:49)  T(F): 98.2 (07-24-23 @ 10:28), Max: 102.4 (07-23-23 @ 13:49)  HR: 75 (07-24-23 @ 10:28) (73 - 86)  BP: 93/56 (07-24-23 @ 10:28) (93/56 - 107/66)  RR: 16 (07-24-23 @ 10:28) (16 - 18)  SpO2: 94% (07-24-23 @ 10:28) (89% - 98%)      Physical exam:   General : NAD, appear to be of stated age , well groomed   NERVOUS SYSTEM:  Alert & Oriented X3, non- focal exam, Motor Strength 5/5 B/L upper and lower extremities; DTRs 2+ intact and symmetric  HEAD:  Atraumatic, Normocephalic  EYES: EOMI, PERRLA, conjunctiva and sclera clear  HEENT: Moist mucous membranes, Supple neck , No JVD  CHEST: Clear to auscultation bilaterally; No rales, no rhonchi, no wheezing  HEART: Regular rate and rhythm; No murmurs, no rubs or gallops  ABDOMEN: Soft, Non-tender, Non-distended; Bowel sounds present, no guarding , no peritoneal irritation   GENITOURINARY- Voiding, no suprapubic tenderness  EXTREMITIES:  2+ Peripheral Pulses, No clubbing, cyanosis,   edema  MUSCULOSKELETAL:- No muscle tenderness, Muscle tone normal, No joint tenderness, no Joint swelling,  Joint ROM –normal   SKIN-no rash, no lesion    Labs radiologic and other test : all reviewed and interpreted :   07-24    132<L>  |  98  |  9   ----------------------------<  127<H>  3.3<L>   |  28  |  0.73    Ca    7.5<L>      24 Jul 2023 06:50  Phos  2.4     07-24  Mg     1.9     07-24    TPro  6.1  /  Alb  1.7<L>  /  TBili  1.1  /  DBili  x   /  AST  134<H>  /  ALT  132<H>  /  AlkPhos  96  07-24                         7.7    x     )-----------( x        ( 24 Jul 2023 08:48 )             21.5     LIVER FUNCTIONS - ( 24 Jul 2023 06:50 )  Alb: 1.7 g/dL / Pro: 6.1 gm/dL / ALK PHOS: 96 U/L / ALT: 132 U/L / AST: 134 U/L / GGT: x             Parasitemia, Blood (07.23.23 @ 06:46)    Specimen Source: .Blood None   Culture Results:   Babesia species  by [facility] Stain  Parasitemia = 1%      Parasitemia, Blood (07.22.23 @ 06:45)    Specimen Source: .Blood None   Culture Results:   Babesia species  by [facility]a Stain  Parasitemia = 2.3 %        Parasitemia, Blood (07.21.23 @ 07:16)    Culture Results:   Babesia species  by [facility]a Stain  Parasitemia = 5.0 %   Specimen Source: .Blood None      Blood Parasites- Travel Associated (07.19.23 @ 12:41)    Specimen Source: .Blood None   Culture Results:   NEGATIVE for Plasmodium antigens. Microscopy is performed for  confirmation.  This test does not detect the presence of Babesia species.  If Babesiosis is suspected, please order test for Babesia PCR: Babesia  microti PCR Bld  ************************************************************  Babesia species  by [facility] Stain  Parasitemia = 7.4%    < from: US Duplex Venous Lower Ext Complete, Bilateral (07.23.23 @ 17:20) >  IMPRESSION:  No evidence of deep venous thrombosis in either lower extremity.    < end of copied text >    < from: CT Angio Chest PE Protocol w/ IV Cont (07.23.23 @ 15:05) >    IMPRESSION:  1. New bilateral lower lobe consolidation and additional scattered   consolidative opacities, as described. Recommend plain film follow-up.  2. No evidence of pulmonary embolism.      < end of copied text >    MR MRCP w/wo IV Cont (07.21.23 @ 11:46) >    IMPRESSION:    1.4 cm lesion in the right hepatic lobe as described above; differential   includes a hemangioma, however a follow-up pre and post IV contrast MRI   of the abdomen is recommended in 3-6 months to assess for stability.    Trace perihepatic ascites.    Small amount of nonspecific pericholecystic fluid.    < from: CT Angio Chest PE Protocol w/ IV Cont (07.23.23 @ 15:05) >  LUNGS AND AIRWAYS: Patent central airways.  New extensive consolidation   throughout the basilar segments of the bilateral lower lobes, right   greater than left. Calcified granuloma in the superior segment of the   right lower lobe. New mild patchy nodular opacity at the right lung apex.   New patchy groundglass and consolidative opacity in the infrahilar right   middle lobe and perihilar left upper lobe.  PLEURA: Mild bilateral layering pleural effusions. No pneumothorax or   pneumomediastinum.  MEDIASTINUM AND REECE: No lymphadenopathy.  VESSELS: There is no evidence of filling defect in the first, second, or   third order branches of the pulmonary arteries. The pulmonary trunk and   main pulmonary arteries are unremarkable. The thoracic aorta and great   vessels are unremarkable.  HEART: Heart size is normal. No pericardial effusion.  CHEST WALL AND LOWER NECK: Within normal limits.  VISUALIZED UPPER ABDOMEN: Within normal limits.  BONES: Within normal limits.    IMPRESSION:  1. New bilateral lower lobe consolidation and additional scattered   consolidative opacities, as described. Recommend plain film follow-up.  2. No evidence of pulmonary embolism.    < end of copied text >         CT Chest No Cont (07.20.23 @ 11:46) >  IMPRESSION:    Mild septal thickening in both lungs, which may be due to pulmonary   edema/volume overload, or known infection.    Clusters small nodules in the right middle lobe, likely related to distal   airway impaction. Recommend CT chest in 3 months to assess for clearance.      Xray Chest 1 View- PORTABLE-Urgent (07.19.23 @ 16:01) >  IMPRESSION: Clear lungs.     US Abdomen Upper Quadrant Right (07.19.23 @ 15:59) >  IMPRESSION:  Nonspecific, mild, focal anterior gallbladder wall thickening/edema   located between the liver and gallbladder.  Remainder of the gallbladder   appears within normal limits. No gallstones are seen. Negative   sonographic Almeida sign. No intra or extrahepatic biliary ductal   dilatation. Finding is nonspecific.  Question hepatitis or other   secondary cause for mild gallbladder wall thickening. Recommend clinical   correlation.  Short interval repeat exam or MRI/MRCP can be obtained for   additional evaluation as clinically indicated.    There is a 1.5 x 1.3 x 1.3 cm echogenic lesion in the right hepatic lobe,   presumably a hepatic hemangioma, however not characterized on the current   exam. If clinically indicated, abdominal MR without and with IV contrast   may be pursued for further evaluation, when clinically feasible.     CT Abdomen and Pelvis w/ IV Cont (07.19.23 @ 14:54) >  IMPRESSION:  No CT evidence for gallstones. Apparent nonspecific trace pericholecystic   fluid. If clinically indicated, gallbladder ultrasoundmay be pursued for   further evaluation.    Indeterminate 1.4 cm hypodense lesion in the right hepatic lobe. If   clinically indicated, nonemergent abdominal MR without and with IV   contrast may be pursued for further evaluation.      RECENT CULTURES:  Culture Results:   NEGATIVE for Plasmodium antigens. Microscopy is performed for  confirmation.  This test does not detect the presence of Babesia species.  If Babesiosis is suspected, please order test for Babesia PCR: Babesia  microti PCR Bld  ************************************************************  Babesia species  by [facility] Stain  Parasitemia = 7.4% (07.19.23 @ 12:41)        Cardiac testing : reviewed   EKG   12 Lead ECG (07.19.23 @ 15:54) >  Ventricular Rate 81 BPM  QTC Calculation(Bazett) 439 ms   Normal sinus rhythm  Possible Left atrial enlargement  Borderline ECG  No previous ECGs available    - TroponinI hsT: <-14.17      Procedures :     Devices:     Current medications:  acetaminophen     Tablet .. 650 milliGRAM(s) Oral every 6 hours PRN  aluminum hydroxide/magnesium hydroxide/simethicone Suspension 30 milliLiter(s) Oral every 4 hours PRN  atovaquone  Suspension 750 milliGRAM(s) Oral two times a day  azithromycin  IVPB 500 milliGRAM(s) IV Intermittent every 24 hours  benzocaine/menthol Lozenge 1 Lozenge Oral four times a day PRN  clonazePAM  Tablet 0.5 milliGRAM(s) Oral at bedtime PRN  cyanocobalamin 1000 MICROGram(s) Oral daily  magnesium oxide 400 milliGRAM(s) Oral at bedtime  melatonin 3 milliGRAM(s) Oral at bedtime PRN  multivitamin 1 Tablet(s) Oral daily  ondansetron Injectable 4 milliGRAM(s) IV Push every 8 hours PRN  saccharomyces boulardii 250 milliGRAM(s) Oral two times a day  sodium chloride 0.9% with potassium chloride 20 mEq/L 1000 milliLiter(s) IV Continuous <Continuous>

## 2023-07-24 NOTE — PROGRESS NOTE ADULT - ASSESSMENT
52 year old female PMHx significant for ulcerative colitis presents to the [facility] for further evaluation and management of symptoms of progressively worsening generalized weakness, malaise/fatigue, myalgias, subjective fevers (TMax at home 103.7'F), generalized abdominal discomfort and decreased PO intake which began on Friday (7/14).  The patient states that her symptoms progressed despite taking Tylenol and Motrin prn. Of note the patient reportedly tested negative for Influenza and COVID-19. Upon triage the patient met sepsis criteria => Vital signs in triage => BP 86/53, HR 70/min, RR 27/min, SpO2 97% on room air. In the ED CCM was consulted to further evaluate.  Labs => PLT 75, Peripheral smear notable for intra-erythrocytic ring form parasites, INR 1.6, Alb 2.6, TBili 1.9, AST//186, CT ABD/Pelvis w/ IV Contrast => No CT evidence for gallstones. Apparent nonspecific trace pericholecystic fluid. US Abdomen => Nonspecific, mild, focal anterior gallbladder wall thickening/edema located between the liver and gallbladder.  Remainder of the gallbladder appears within normal limits. No gallstones are seen. Negative sonographic Almeida sign. No intra or extrahepatic biliary ductal dilatation. Question hepatitis or other secondary cause for mild gallbladder wall thickening.  In the ED the patient received Acetaminophen 650mg PO x 1, Ceftriaxone 1g IVPB x 1, Azithromycin 500mg IVPB x 1, Ketorolac 30mg IVP x 1, and NS x 1.9L.     1. Fever. Babesiosis. Tick borne illness. Multifocal pneumonia   - imaging reviewed   - fever, anemia, transaminitis, thrombocytopenia d/t babesia  - 7.4 % parasitemia c/w severe disease - improving - down to 1% f/u parasitemia level   - on atovaquone 750mg BID #5-6, on azithromycin 500mg daily #6  - will require 10 day course of above tx  - started on rocephin 1gm daily #3 for pna coverage  - continue with IV antibiotics    - lyme screen (-),  ehrlichia, anaplasma pcr (-)  - hematology f/u noted   - monitor temps  - tolerating abx well so far; no side effects noted  - reason for abx use and side effects reviewed with patient  - supportive care  - fu cbc    2. other issues - care per medicine

## 2023-07-24 NOTE — CONSULT NOTE ADULT - ASSESSMENT
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  Presently on antibiotics and clinically stable.  Continues to have significant anemia however parasitemia is less than 10%.    Recommendations    Patient should be typed and crossed and transfused today  Follow-up CBC and reassess percent parasitemia in the a.m.  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  Presently on antibiotics and clinically stable.  Continues to have significant anemia however parasitemia is less than 1 %.    Recommendations    Patient should be type and crossed and transfused today  Follow-up CBC and reassess percent parasitemia in the a.m.  For progressive anemia, end-organ complications, increased parasitemia  consider exchange transfusion  Presently looks well clinically therefore will hold on exchange and see how she does with the Red cell transfusion and antibiotics  FU labs in AM

## 2023-07-24 NOTE — CONSULT NOTE ADULT - SUBJECTIVE AND OBJECTIVE BOX
HPI:    52 year old female with stable ulcerative colitis ( not on immune suppressive)    presents with generalized weakness, malaise/fatigue, myalgias,  fevers ,abdominal discomfort and decreased PO intake since   No relief with Tylenol /Motrin  She had a negative  Influenza and COVID-19 test; . In ER  BP 86/53, HR 70/min, RR 27/min, SpO2 97% on room air.   Labs revealed anemia and  PLT 75, Peripheral smear notable for intra-erythrocytic ring form parasites c/w  Babesiosis   Started antibiotics     Seen by ID : Now on atovaquone 750mg BID #5-6, on azithromycin 500mg daily #6  Parasitemia " 7 % to 1 %  Seen by Pulm for dyspnea : No PE / ? multifocal pneumonia       INR 1.6, Alb 2.6, TBili 1.9, AST//186,  CT ABD/Pelvis w/ IV Contrast => No CT evidence for gallstones. Apparent nonspecific trace pericholecystic fluid. If clinically indicated, gallbladder ultrasound may be pursued for further evaluation. Indeterminate 1.4 cm hypodense lesion in the right hepatic lobe. If clinically indicated, nonemergent abdominal MR without and with IV contrast may be pursued for further evaluation.    US Abdomen => Nonspecific, mild, focal anterior gallbladder wall thickening/edema located between the liver and gallbladder.    1.5 x 1.3 x 1.3 cm echogenic lesion in the right hepatic lobe, presumably   hemangioma      Patient is presently looking well.  She does not appear ill or toxic.  She is alert and oriented.  Denies any dyspnea while at rest.  Positive for fatigue       PAST MEDICAL & SURGICAL HISTORY:  Ulcerative colitis  H/O:   x3    MEDICATIONS  (STANDING):  albuterol    0.083% 2.5 milliGRAM(s) Nebulizer every 8 hours  albuterol    90 MICROgram(s) HFA Inhaler 1 Puff(s) Inhalation every 4 hours  atovaquone  Suspension 750 milliGRAM(s) Oral two times a day  azithromycin  IVPB 500 milliGRAM(s) IV Intermittent every 24 hours  budesonide 160 MICROgram(s)/formoterol 4.5 MICROgram(s) Inhaler 2 Puff(s) Inhalation two times a day  cefTRIAXone Injectable. 1000 milliGRAM(s) IV Push every 24 hours  cefTRIAXone Injectable.      cyanocobalamin 1000 MICROGram(s) Oral daily  furosemide   Injectable 20 milliGRAM(s) IV Push once  guaiFENesin Oral Liquid (Sugar-Free) 200 milliGRAM(s) Oral every 6 hours  magnesium oxide 400 milliGRAM(s) Oral at bedtime  multivitamin 1 Tablet(s) Oral daily  potassium phosphate / sodium phosphate Tablet (K-PHOS No. 2) 1 Tablet(s) Oral three times a day  saccharomyces boulardii 250 milliGRAM(s) Oral two times a day    MEDICATIONS  (PRN):  acetaminophen     Tablet .. 650 milliGRAM(s) Oral every 6 hours PRN Temp greater or equal to 38C (100.4F), Mild Pain (1 - 3)  aluminum hydroxide/magnesium hydroxide/simethicone Suspension 30 milliLiter(s) Oral every 4 hours PRN Dyspepsia  benzocaine/menthol Lozenge 1 Lozenge Oral four times a day PRN Sore Throat  clonazePAM  Tablet 0.5 milliGRAM(s) Oral at bedtime PRN Insomnia  ibuprofen  Tablet. 400 milliGRAM(s) Oral every 6 hours PRN Temp greater or equal to 38.5C (101.3F)  melatonin 3 milliGRAM(s) Oral at bedtime PRN Insomnia  ondansetron Injectable 4 milliGRAM(s) IV Push every 8 hours PRN Nausea and/or Vomiting      Allergies    No Known Allergies    Intolerances        SOCIAL HISTORY:    FAMILY HISTORY:  No pertinent family history in first degree relatives        Vital Signs Last 24 Hrs  T(C): 37.2 (2023 17:36), Max: 39.1 (2023 06:01)  T(F): 99 (2023 17:36), Max: 102.4 (2023 06:01)  HR: 75 (2023 17:36) (73 - 86)  BP: 88/58 (2023 17:36) (88/58 - 107/66)  BP(mean): --  RR: 18 (2023 17:36) (16 - 18)  SpO2: 93% (2023 17:36) (89% - 98%)    Parameters below as of 2023 17:36  Patient On (Oxygen Delivery Method): room air      LABS:                        7.7    x     )-----------( x        ( 2023 08:48 )             21.5     07-24    132<L>  |  98  |  9   ----------------------------<  127<H>  3.3<L>   |  28  |  0.73    Ca    7.5<L>      2023 06:50  Phos  2.4       Mg     1.9         TPro  6.1  /  Alb  1.7<L>  /  TBili  1.1  /  DBili  x   /  AST  134<H>  /  ALT  132<H>  /  AlkPhos  96        Urinalysis Basic - ( 2023 06:50 )    Color: x / Appearance: x / SG: x / pH: x  Gluc: 127 mg/dL / Ketone: x  / Bili: x / Urobili: x   Blood: x / Protein: x / Nitrite: x   Leuk Esterase: x / RBC: x / WBC x   Sq Epi: x / Non Sq Epi: x / Bacteria: x     HPI:    52 year old female with stable ulcerative colitis ( not on immune suppressive)    presents with generalized weakness, malaise/fatigue, myalgias,  fevers ,abdominal discomfort and decreased PO intake since   No relief with Tylenol /Motrin  She had a negative  Influenza and COVID-19 test; .  seen In ER  BP 86/53, HR 70/min, RR 27/min, SpO2 97% on room air.   Labs revealed anemia and  PLT 75, Peripheral smear notable for intra-erythrocytic ring form parasites c/w  Babesiosis 7 %  Started antibiotics     Seen by ID : Now on atovaquone 750mg BID #5-6, on azithromycin 500mg daily #6  Parasitemia   7 %     Seen by Pulm   for dyspnea : No PE /  multi-focal pneumonia       INR 1.6, Alb 2.6, TBili 1.9, AST//186,    CT ABD/Pelvis w/ IV Contrast => No CT evidence for gallstones. Apparent nonspecific trace pericholecystic fluid. If clinically indicated, gallbladder ultrasound may be pursued for further evaluation. Indeterminate 1.4 cm hypodense lesion in the right hepatic lobe. If clinically indicated, nonemergent abdominal MR without and with IV contrast may be pursued for further evaluation.  No enlarged spleen  US Abdomen => Nonspecific, mild, focal anterior gallbladder wall thickening/edema located between the liver and gallbladder.    1.5 x 1.3 x 1.3 cm echogenic lesion in the right hepatic lobe, presumably   hemangioma      Patient is presently looking well.  She does not appear ill or toxic.  She is alert and oriented.  Denies dyspnea while at rest.    Positive for fatigue       PAST MEDICAL & SURGICAL HISTORY:  Ulcerative colitis  H/O:   x3    MEDICATIONS  (STANDING):  albuterol    0.083% 2.5 milliGRAM(s) Nebulizer every 8 hours  albuterol    90 MICROgram(s) HFA Inhaler 1 Puff(s) Inhalation every 4 hours  atovaquone  Suspension 750 milliGRAM(s) Oral two times a day  azithromycin  IVPB 500 milliGRAM(s) IV Intermittent every 24 hours  budesonide 160 MICROgram(s)/formoterol 4.5 MICROgram(s) Inhaler 2 Puff(s) Inhalation two times a day  cefTRIAXone Injectable. 1000 milliGRAM(s) IV Push every 24 hours  cefTRIAXone Injectable.      cyanocobalamin 1000 MICROGram(s) Oral daily  furosemide   Injectable 20 milliGRAM(s) IV Push once  guaiFENesin Oral Liquid (Sugar-Free) 200 milliGRAM(s) Oral every 6 hours  magnesium oxide 400 milliGRAM(s) Oral at bedtime  multivitamin 1 Tablet(s) Oral daily  potassium phosphate / sodium phosphate Tablet (K-PHOS No. 2) 1 Tablet(s) Oral three times a day  saccharomyces boulardii 250 milliGRAM(s) Oral two times a day    MEDICATIONS  (PRN):  acetaminophen     Tablet .. 650 milliGRAM(s) Oral every 6 hours PRN Temp greater or equal to 38C (100.4F), Mild Pain (1 - 3)  aluminum hydroxide/magnesium hydroxide/simethicone Suspension 30 milliLiter(s) Oral every 4 hours PRN Dyspepsia  benzocaine/menthol Lozenge 1 Lozenge Oral four times a day PRN Sore Throat  clonazePAM  Tablet 0.5 milliGRAM(s) Oral at bedtime PRN Insomnia  ibuprofen  Tablet. 400 milliGRAM(s) Oral every 6 hours PRN Temp greater or equal to 38.5C (101.3F)  melatonin 3 milliGRAM(s) Oral at bedtime PRN Insomnia  ondansetron Injectable 4 milliGRAM(s) IV Push every 8 hours PRN Nausea and/or Vomiting      Allergies    No Known Allergies    Intolerances      FAMILY HISTORY:  No pertinent family history in first degree relatives    Vital Signs Last 24 Hrs  T(C): 37.2 (2023 17:36), Max: 39.1 (2023 06:01)  T(F): 99 (2023 17:36), Max: 102.4 (2023 06:01)  HR: 75 (2023 17:36) (73 - 86)  BP: 88/58 (2023 17:36) (88/58 - 107/66)  BP(mean): --  RR: 18 (2023 17:36) (16 - 18)  SpO2: 93% (2023 17:36) (89% - 98%)  NAD  Comfortable  Neck no nodes  No rash  Cor neg murmur  Lungs dullness percussion  No splenomegaly    Ext neg  Parameters below as of 2023 17:36  Patient On (Oxygen Delivery Method): room air      LABS:                        7.7    x     )-----------( x        ( 2023 08:48 )             21.5     -    132<L>  |  98  |  9   ----------------------------<  127<H>  3.3<L>   |  28  |  0.73    Ca    7.5<L>      2023 06:50  Phos  2.4       Mg     1.9         TPro  6.1  /  Alb  1.7<L>  /  TBili  1.1  /  DBili  x   /  AST  134<H>  /  ALT  132<H>  /  AlkPhos  96      Babesiosis  1 %     Urinalysis Basic - ( 2023 06:50 )    Color: x / Appearance: x / SG: x / pH: x  Gluc: 127 mg/dL / Ketone: x  / Bili: x / Urobili: x   Blood: x / Protein: x / Nitrite: x   Leuk Esterase: x / RBC: x / WBC x   Sq Epi: x / Non Sq Epi: x / Bacteria: x

## 2023-07-24 NOTE — PROGRESS NOTE ADULT - ASSESSMENT
52 year old female PMHx significant for ulcerative colitis presents to the [facility] on 7/19/23  for further evaluation and management of symptoms of progressively worsening generalized weakness, malaise/fatigue, myalgias, subjective fevers (TMax at home 103.7'F), generalized abdominal discomfort and decreased PO intake which began on Friday (7/14).  The patient states that her symptoms progressed despite taking Tylenol and Motrin prn. Of note the patient reportedly tested negative for Influenza and COVID-19. Upon triage the patient met sepsis criteria => Vital signs in triage => BP 86/53, HR 70/min, RR 27/min, SpO2 97% on room air. In the ED CCM was consulted to further evaluate.     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 Azithromycin 500mg IVPB daily needs 10 days course , day 5 in the hospital  - ID consult noted    - cont. anti-pyretics prn  - tylenol alternating with ibuprofen  - oncology consult Dr. Uriarte - ? needs for blood exchange   - 7/24 - 1 unit of PRBC with lasix 20 after       Acute hypoxic respiratory failure due to   probable mild fluids overload  and bilateral early Pneumonia   - CT chest - mild septal thickening in both lungs - ? edema, vs infection   - CTA 7/23 - New bilateral lower lobe consolidation,  doppler LE - no DVT    -  stop IV fluid   - 7/22 added Ceftriaxone  - 7/23 - s/p low dose lasix 20 mg   - ABG  noted, start telemetry and pulse oxymetry monitoring   - pulmonary consult         1.4 cm right hepatic lesion with transaminitis   - MRCP - likely hemangioma , o/p repeat test   - CT abd - noted  - hepatitis panel neg     Hypovolumic hyponatremia, Hypoalbuminemia - monitor Na      Hypokalemia, Hypophosphatemia -  replace      Hypocalcemia  -  vit D wnl     UC - add probiotics    Anxiety ~cont. Clonazepam 0.5mg po qhs prn    ADHD ~will hold Dextroamphetamine-Amphetamine (takes 10mg po daily), takes Dextroamphetamine-Amphetamine ER 20mg daily prn    Vte ppx ~cont. SCDs for now CTA - neg for PE, no DVT     Dispo - abx, antipyretics , inpatient monitoring until cleared by ID and pulmonary and oncology

## 2023-07-24 NOTE — PROGRESS NOTE ADULT - SUBJECTIVE AND OBJECTIVE BOX
Patient is a 52y old  Female who presents with a chief complaint of Generalized Malaise, Fatigue, Myalgias, Fever, Abdominal Pain, Nausea, decreased PO intake. (22 Jul 2023 14:46)      HPI:  52 year old female PMHx significant for ulcerative colitis presents to the [facility] for further evaluation and management of symptoms of progressively worsening generalized weakness, malaise/fatigue, myalgias, subjective fevers (TMax at home 103.7'F), generalized abdominal discomfort and decreased PO intake which began on Friday (7/14).  The patient states that her symptoms progressed despite taking Tylenol and Motrin prn. Of note the patient reportedly tested negative for Influenza and COVID-19. Upon triage the patient met sepsis criteria => Vital signs in triage => BP 86/53, HR 70/min, RR 27/min, SpO2 97% on room air. In the ED CCM was consulted to further evaluate.   Found to have Babesiosis   CT Chest revealed findings concerning of mild pulmonary edema (likely non cardiogenic)/parasite related     MEDICATIONS  (STANDING):  albuterol    0.083% 2.5 milliGRAM(s) Nebulizer every 8 hours  albuterol    90 MICROgram(s) HFA Inhaler 1 Puff(s) Inhalation every 4 hours  atovaquone  Suspension 750 milliGRAM(s) Oral two times a day  azithromycin  IVPB 500 milliGRAM(s) IV Intermittent every 24 hours  budesonide 160 MICROgram(s)/formoterol 4.5 MICROgram(s) Inhaler 2 Puff(s) Inhalation two times a day  cefTRIAXone Injectable.      cefTRIAXone Injectable. 1000 milliGRAM(s) IV Push every 24 hours  cyanocobalamin 1000 MICROGram(s) Oral daily  guaiFENesin Oral Liquid (Sugar-Free) 200 milliGRAM(s) Oral every 6 hours  magnesium oxide 400 milliGRAM(s) Oral at bedtime  multivitamin 1 Tablet(s) Oral daily  saccharomyces boulardii 250 milliGRAM(s) Oral two times a day    MEDICATIONS  (PRN):  acetaminophen     Tablet .. 650 milliGRAM(s) Oral every 6 hours PRN Temp greater or equal to 38C (100.4F), Mild Pain (1 - 3)  aluminum hydroxide/magnesium hydroxide/simethicone Suspension 30 milliLiter(s) Oral every 4 hours PRN Dyspepsia  benzocaine/menthol Lozenge 1 Lozenge Oral four times a day PRN Sore Throat  clonazePAM  Tablet 0.5 milliGRAM(s) Oral at bedtime PRN Insomnia  ibuprofen  Tablet. 400 milliGRAM(s) Oral every 6 hours PRN Temp greater or equal to 38.5C (101.3F)  melatonin 3 milliGRAM(s) Oral at bedtime PRN Insomnia  ondansetron Injectable 4 milliGRAM(s) IV Push every 8 hours PRN Nausea and/or Vomiting    Vital Signs Last 24 Hrs  T(C): 39.1 (24 Jul 2023 06:01), Max: 39.1 (23 Jul 2023 13:49)  T(F): 102.4 (24 Jul 2023 06:01), Max: 102.4 (23 Jul 2023 13:49)  HR: 80 (24 Jul 2023 06:14) (72 - 86)  BP: 97/54 (24 Jul 2023 06:01) (93/56 - 107/66)  BP(mean): --  RR: 18 (24 Jul 2023 06:04) (16 - 18)  SpO2: 94% (24 Jul 2023 06:14) (89% - 96%)    Parameters below as of 24 Jul 2023 06:14  Patient On (Oxygen Delivery Method): nasal cannula w/ humidification, 5lpm          I&O's Summary    PHYSICAL EXAM  General Appearance: cooperative, no acute distress,   HEENT: PERRL, conjunctiva clear, EOM's intact, non injected pharynx, no exudate, TM   normal  Neck: Supple, , no adenopathy, thyroid: not enlarged, no carotid bruit or JVD  Back: Symmetric, no  tenderness,no soft tissue tenderness  Lungs: diminished at the bases, cough is present with exhalation   Heart: Regular rate and rhythm, S1, S2 normal, no murmur, rub or gallop  Abdomen: Soft, non-tender, bowel sounds active , no hepatosplenomegaly  Extremities: no cyanosis or edema, no joint swelling  Skin: Skin color, texture normal, no rashes   Neurologic: Alert and oriented X3 , cranial nerves intact, sensory and motor normal,    ECG:    LABS:                          8.5    5.42  )-----------( 216      ( 23 Jul 2023 06:46 )             24.0     07-23    135  |  100  |  9   ----------------------------<  119<H>  3.0<L>   |  29  |  0.66    Ca    7.5<L>      23 Jul 2023 06:46  Phos  2.8     07-23  Mg     1.9     07-23    TPro  5.8<L>  /  Alb  1.6<L>  /  TBili  1.1  /  DBili  x   /  AST  128<H>  /  ALT  121<H>  /  AlkPhos  105  07-23    CARDIAC MARKERS ( 22 Jul 2023 06:45 )  x     / x     / 169 U/L / x     / x                Urinalysis Basic - ( 23 Jul 2023 06:46 )    Color: x / Appearance: x / SG: x / pH: x  Gluc: 119 mg/dL / Ketone: x  / Bili: x / Urobili: x   Blood: x / Protein: x / Nitrite: x   Leuk Esterase: x / RBC: x / WBC x   Sq Epi: x / Non Sq Epi: x / Bacteria: x            RADIOLOGY & ADDITIONAL STUDIES:

## 2023-07-25 LAB
ALBUMIN SERPL ELPH-MCNC: 1.9 G/DL — LOW (ref 3.3–5)
ALP SERPL-CCNC: 116 U/L — SIGNIFICANT CHANGE UP (ref 40–120)
ALT FLD-CCNC: 141 U/L — HIGH (ref 12–78)
ANION GAP SERPL CALC-SCNC: 1 MMOL/L — LOW (ref 5–17)
ANISOCYTOSIS BLD QL: SLIGHT — SIGNIFICANT CHANGE UP
AST SERPL-CCNC: 137 U/L — HIGH (ref 15–37)
BASE EXCESS BLDV CALC-SCNC: 6 MMOL/L — HIGH (ref -2–3)
BASOPHILS # BLD AUTO: 0 K/UL — SIGNIFICANT CHANGE UP (ref 0–0.2)
BASOPHILS NFR BLD AUTO: 0 % — SIGNIFICANT CHANGE UP (ref 0–2)
BILIRUB SERPL-MCNC: 1.3 MG/DL — HIGH (ref 0.2–1.2)
BUN SERPL-MCNC: 7 MG/DL — SIGNIFICANT CHANGE UP (ref 7–23)
CALCIUM SERPL-MCNC: 8.4 MG/DL — LOW (ref 8.5–10.1)
CHLORIDE SERPL-SCNC: 100 MMOL/L — SIGNIFICANT CHANGE UP (ref 96–108)
CO2 SERPL-SCNC: 34 MMOL/L — HIGH (ref 22–31)
CREAT SERPL-MCNC: 0.73 MG/DL — SIGNIFICANT CHANGE UP (ref 0.5–1.3)
CRP SERPL-MCNC: 133 MG/L — HIGH
CULTURE RESULTS: SIGNIFICANT CHANGE UP
EGFR: 99 ML/MIN/1.73M2 — SIGNIFICANT CHANGE UP
EOSINOPHIL # BLD AUTO: 0.15 K/UL — SIGNIFICANT CHANGE UP (ref 0–0.5)
EOSINOPHIL NFR BLD AUTO: 2 % — SIGNIFICANT CHANGE UP (ref 0–6)
GLUCOSE SERPL-MCNC: 96 MG/DL — SIGNIFICANT CHANGE UP (ref 70–99)
HCO3 BLDV-SCNC: 32 MMOL/L — HIGH (ref 22–29)
HCT VFR BLD CALC: 27.2 % — LOW (ref 34.5–45)
HGB BLD-MCNC: 9.4 G/DL — LOW (ref 11.5–15.5)
LG PLATELETS BLD QL AUTO: SLIGHT — SIGNIFICANT CHANGE UP
LYMPHOCYTES # BLD AUTO: 2.12 K/UL — SIGNIFICANT CHANGE UP (ref 1–3.3)
LYMPHOCYTES # BLD AUTO: 29 % — SIGNIFICANT CHANGE UP (ref 13–44)
M PNEUMO IGM SER-ACNC: 0.9 INDEX — SIGNIFICANT CHANGE UP (ref 0–0.9)
MACROCYTES BLD QL: SLIGHT — SIGNIFICANT CHANGE UP
MAGNESIUM SERPL-MCNC: 2.1 MG/DL — SIGNIFICANT CHANGE UP (ref 1.6–2.6)
MANUAL SMEAR VERIFICATION: SIGNIFICANT CHANGE UP
MCHC RBC-ENTMCNC: 28.7 PG — SIGNIFICANT CHANGE UP (ref 27–34)
MCHC RBC-ENTMCNC: 34.6 GM/DL — SIGNIFICANT CHANGE UP (ref 32–36)
MCV RBC AUTO: 83.2 FL — SIGNIFICANT CHANGE UP (ref 80–100)
MONOCYTES # BLD AUTO: 0.66 K/UL — SIGNIFICANT CHANGE UP (ref 0–0.9)
MONOCYTES NFR BLD AUTO: 9 % — SIGNIFICANT CHANGE UP (ref 2–14)
MYCOPLASMA AG SPEC QL: NEGATIVE — SIGNIFICANT CHANGE UP
NEUTROPHILS # BLD AUTO: 4.1 K/UL — SIGNIFICANT CHANGE UP (ref 1.8–7.4)
NEUTROPHILS NFR BLD AUTO: 56 % — SIGNIFICANT CHANGE UP (ref 43–77)
NRBC # BLD: 0 /100 — SIGNIFICANT CHANGE UP (ref 0–0)
NRBC # BLD: SIGNIFICANT CHANGE UP /100 WBCS (ref 0–0)
NT-PROBNP SERPL-SCNC: 283 PG/ML — HIGH (ref 0–125)
PCO2 BLDV: 52 MMHG — HIGH (ref 39–42)
PH BLDV: 7.4 — SIGNIFICANT CHANGE UP (ref 7.32–7.43)
PHOSPHATE SERPL-MCNC: 3.2 MG/DL — SIGNIFICANT CHANGE UP (ref 2.5–4.5)
PLAT MORPH BLD: ABNORMAL
PLATELET # BLD AUTO: 486 K/UL — HIGH (ref 150–400)
PO2 BLDV: 45 MMHG — SIGNIFICANT CHANGE UP (ref 25–45)
POLYCHROMASIA BLD QL SMEAR: SLIGHT — SIGNIFICANT CHANGE UP
POTASSIUM SERPL-MCNC: 3.6 MMOL/L — SIGNIFICANT CHANGE UP (ref 3.5–5.3)
POTASSIUM SERPL-SCNC: 3.6 MMOL/L — SIGNIFICANT CHANGE UP (ref 3.5–5.3)
PROT SERPL-MCNC: 7.2 GM/DL — SIGNIFICANT CHANGE UP (ref 6–8.3)
RBC # BLD: 3.27 M/UL — LOW (ref 3.8–5.2)
RBC # FLD: 14.8 % — HIGH (ref 10.3–14.5)
RBC BLD AUTO: ABNORMAL
SAO2 % BLDV: 73 % — SIGNIFICANT CHANGE UP (ref 67–88)
SODIUM SERPL-SCNC: 135 MMOL/L — SIGNIFICANT CHANGE UP (ref 135–145)
SPECIMEN SOURCE: SIGNIFICANT CHANGE UP
TARGETS BLD QL SMEAR: SLIGHT — SIGNIFICANT CHANGE UP
VARIANT LYMPHS # BLD: 4 % — SIGNIFICANT CHANGE UP (ref 0–6)
WBC # BLD: 7.32 K/UL — SIGNIFICANT CHANGE UP (ref 3.8–10.5)
WBC # FLD AUTO: 7.32 K/UL — SIGNIFICANT CHANGE UP (ref 3.8–10.5)

## 2023-07-25 PROCEDURE — 99232 SBSQ HOSP IP/OBS MODERATE 35: CPT

## 2023-07-25 PROCEDURE — 93306 TTE W/DOPPLER COMPLETE: CPT | Mod: 26

## 2023-07-25 RX ADMIN — Medication 200 MILLIGRAM(S): at 06:28

## 2023-07-25 RX ADMIN — PREGABALIN 1000 MICROGRAM(S): 225 CAPSULE ORAL at 10:19

## 2023-07-25 RX ADMIN — CEFTRIAXONE 1000 MILLIGRAM(S): 500 INJECTION, POWDER, FOR SOLUTION INTRAMUSCULAR; INTRAVENOUS at 10:19

## 2023-07-25 RX ADMIN — Medication 650 MILLIGRAM(S): at 10:19

## 2023-07-25 RX ADMIN — Medication 1 TABLET(S): at 10:19

## 2023-07-25 RX ADMIN — LIDOCAINE 1 PATCH: 4 CREAM TOPICAL at 06:29

## 2023-07-25 RX ADMIN — Medication 400 MILLIGRAM(S): at 12:00

## 2023-07-25 RX ADMIN — LIDOCAINE 1 PATCH: 4 CREAM TOPICAL at 11:14

## 2023-07-25 RX ADMIN — Medication 400 MILLIGRAM(S): at 11:24

## 2023-07-25 RX ADMIN — BUDESONIDE AND FORMOTEROL FUMARATE DIHYDRATE 2 PUFF(S): 160; 4.5 AEROSOL RESPIRATORY (INHALATION) at 20:34

## 2023-07-25 RX ADMIN — ALBUTEROL 2.5 MILLIGRAM(S): 90 AEROSOL, METERED ORAL at 07:52

## 2023-07-25 RX ADMIN — Medication 200 MILLIGRAM(S): at 23:37

## 2023-07-25 RX ADMIN — ALBUTEROL 2.5 MILLIGRAM(S): 90 AEROSOL, METERED ORAL at 17:14

## 2023-07-25 RX ADMIN — Medication 200 MILLIGRAM(S): at 17:26

## 2023-07-25 RX ADMIN — AZITHROMYCIN 255 MILLIGRAM(S): 500 TABLET, FILM COATED ORAL at 13:55

## 2023-07-25 RX ADMIN — Medication 200 MILLIGRAM(S): at 11:24

## 2023-07-25 RX ADMIN — MAGNESIUM OXIDE 400 MG ORAL TABLET 400 MILLIGRAM(S): 241.3 TABLET ORAL at 21:10

## 2023-07-25 RX ADMIN — Medication 650 MILLIGRAM(S): at 00:01

## 2023-07-25 RX ADMIN — ALBUTEROL 2.5 MILLIGRAM(S): 90 AEROSOL, METERED ORAL at 20:34

## 2023-07-25 RX ADMIN — Medication 0.5 MILLIGRAM(S): at 21:17

## 2023-07-25 RX ADMIN — Medication 650 MILLIGRAM(S): at 00:05

## 2023-07-25 RX ADMIN — Medication 250 MILLIGRAM(S): at 10:21

## 2023-07-25 RX ADMIN — ATOVAQUONE 750 MILLIGRAM(S): 750 SUSPENSION ORAL at 10:20

## 2023-07-25 RX ADMIN — ATOVAQUONE 750 MILLIGRAM(S): 750 SUSPENSION ORAL at 21:17

## 2023-07-25 RX ADMIN — Medication 1 TABLET(S): at 11:24

## 2023-07-25 RX ADMIN — Medication 650 MILLIGRAM(S): at 11:13

## 2023-07-25 RX ADMIN — Medication 250 MILLIGRAM(S): at 21:10

## 2023-07-25 RX ADMIN — BUDESONIDE AND FORMOTEROL FUMARATE DIHYDRATE 2 PUFF(S): 160; 4.5 AEROSOL RESPIRATORY (INHALATION) at 07:52

## 2023-07-25 NOTE — PROGRESS NOTE ADULT - ASSESSMENT
52 year old female PMHx significant for ulcerative colitis presents to the [facility] for further evaluation and management of symptoms of progressively worsening generalized weakness, malaise/fatigue, myalgias, subjective fevers (TMax at home 103.7'F), generalized abdominal discomfort and decreased PO intake which began on Friday (7/14).  The patient states that her symptoms progressed despite taking Tylenol and Motrin prn. Of note the patient reportedly tested negative for Influenza and COVID-19. Upon triage the patient met sepsis criteria => Vital signs in triage => BP 86/53, HR 70/min, RR 27/min, SpO2 97% on room air. In the ED CCM was consulted to further evaluate.   Found to have Babesiosis   CTA performed 7/23 due to increased O2 requirements- Findings represent bilateral consolidations with mild pleural effusions- consolidations have increased since 7/20.   On examination, she has diminished breath sounds at the bases and a cough with exhalation still  Started Symbicort temporarily  On Atovaquone/Azithromycin ; Ceftriaxone for pneumonia   ABG reviewed - 7.51/34/61  Continue trend BNP, XR  Dopplers negative  On room air   Transfused 1 U PRBC  Will continue to monitor - she is not in acute respiratory distress   Reassess for ambulatory desaturations

## 2023-07-25 NOTE — PROGRESS NOTE ADULT - ASSESSMENT
52 year old female PMHx significant for ulcerative colitis presents to the [facility] for further evaluation and management of symptoms of progressively worsening generalized weakness, malaise/fatigue, myalgias, subjective fevers (TMax at home 103.7'F), generalized abdominal discomfort and decreased PO intake which began on Friday (7/14).  The patient states that her symptoms progressed despite taking Tylenol and Motrin prn. Of note the patient reportedly tested negative for Influenza and COVID-19. Upon triage the patient met sepsis criteria => Vital signs in triage => BP 86/53, HR 70/min, RR 27/min, SpO2 97% on room air. In the ED CCM was consulted to further evaluate.  Labs => PLT 75, Peripheral smear notable for intra-erythrocytic ring form parasites, INR 1.6, Alb 2.6, TBili 1.9, AST//186, CT ABD/Pelvis w/ IV Contrast => No CT evidence for gallstones. Apparent nonspecific trace pericholecystic fluid. US Abdomen => Nonspecific, mild, focal anterior gallbladder wall thickening/edema located between the liver and gallbladder.  Remainder of the gallbladder appears within normal limits. No gallstones are seen. Negative sonographic Almeida sign. No intra or extrahepatic biliary ductal dilatation. Question hepatitis or other secondary cause for mild gallbladder wall thickening.  In the ED the patient received Acetaminophen 650mg PO x 1, Ceftriaxone 1g IVPB x 1, Azithromycin 500mg IVPB x 1, Ketorolac 30mg IVP x 1, and NS x 1.9L.     1. Fever. Babesiosis. Tick borne illness. Multifocal pneumonia   - imaging reviewed, slowly improving   - fever, anemia, transaminitis, thrombocytopenia d/t babesia  - 7.4 % parasitemia c/w severe disease - improving - down to <1% f/u parasitemia level   - on atovaquone 750mg BID #6-7, on azithromycin 500mg daily #7  - will require 10 day course of above tx  - on rocephin 1gm daily #4 for pna coverage  - continue with IV antibiotics    - lyme screen (-),  ehrlichia, anaplasma pcr (-)  - hematology f/u noted, s/p transfusion  - monitor temps  - tolerating abx well so far; no side effects noted  - reason for abx use and side effects reviewed with patient  - supportive care  - fu cbc    2. other issues - care per medicine

## 2023-07-25 NOTE — PROGRESS NOTE ADULT - SUBJECTIVE AND OBJECTIVE BOX
Patient is a 52y old  Female who presents with a chief complaint of Generalized Malaise, Fatigue, Myalgias, Fever, Abdominal Pain, Nausea, decreased PO intake. (22 Jul 2023 14:46)      HPI:  52 year old female PMHx significant for ulcerative colitis presents to the [facility] for further evaluation and management of symptoms of progressively worsening generalized weakness, malaise/fatigue, myalgias, subjective fevers (TMax at home 103.7'F), generalized abdominal discomfort and decreased PO intake which began on Friday (7/14).  The patient states that her symptoms progressed despite taking Tylenol and Motrin prn. Of note the patient reportedly tested negative for Influenza and COVID-19. Upon triage the patient met sepsis criteria => Vital signs in triage => BP 86/53, HR 70/min, RR 27/min, SpO2 97% on room air. In the ED CCM was consulted to further evaluate.   Found to have Babesiosis   CT Chest revealed findings concerning of mild pulmonary edema (likely non cardiogenic)/parasite related     MEDICATIONS  (STANDING):  albuterol    0.083% 2.5 milliGRAM(s) Nebulizer every 8 hours  albuterol    90 MICROgram(s) HFA Inhaler 1 Puff(s) Inhalation every 4 hours  atovaquone  Suspension 750 milliGRAM(s) Oral two times a day  azithromycin  IVPB 500 milliGRAM(s) IV Intermittent every 24 hours  budesonide 160 MICROgram(s)/formoterol 4.5 MICROgram(s) Inhaler 2 Puff(s) Inhalation two times a day  cefTRIAXone Injectable.      cefTRIAXone Injectable. 1000 milliGRAM(s) IV Push every 24 hours  cyanocobalamin 1000 MICROGram(s) Oral daily  guaiFENesin Oral Liquid (Sugar-Free) 200 milliGRAM(s) Oral every 6 hours  magnesium oxide 400 milliGRAM(s) Oral at bedtime  multivitamin 1 Tablet(s) Oral daily  potassium phosphate / sodium phosphate Tablet (K-PHOS No. 2) 1 Tablet(s) Oral three times a day  saccharomyces boulardii 250 milliGRAM(s) Oral two times a day    MEDICATIONS  (PRN):  acetaminophen     Tablet .. 650 milliGRAM(s) Oral every 6 hours PRN Temp greater or equal to 38C (100.4F), Mild Pain (1 - 3)  aluminum hydroxide/magnesium hydroxide/simethicone Suspension 30 milliLiter(s) Oral every 4 hours PRN Dyspepsia  benzocaine/menthol Lozenge 1 Lozenge Oral four times a day PRN Sore Throat  clonazePAM  Tablet 0.5 milliGRAM(s) Oral at bedtime PRN Insomnia  ibuprofen  Tablet. 400 milliGRAM(s) Oral every 6 hours PRN Temp greater or equal to 38.5C (101.3F)  melatonin 3 milliGRAM(s) Oral at bedtime PRN Insomnia  ondansetron Injectable 4 milliGRAM(s) IV Push every 8 hours PRN Nausea and/or Vomiting    Vital Signs Last 24 Hrs  T(C): 37.3 (25 Jul 2023 06:33), Max: 38.4 (24 Jul 2023 15:18)  T(F): 99.1 (25 Jul 2023 06:33), Max: 101.1 (24 Jul 2023 15:18)  HR: 75 (25 Jul 2023 00:30) (73 - 82)  BP: 105/64 (25 Jul 2023 00:30) (88/58 - 105/64)  BP(mean): --  RR: 18 (25 Jul 2023 00:30) (16 - 18)  SpO2: 91% (25 Jul 2023 00:30) (91% - 96%)    Parameters below as of 25 Jul 2023 00:30  Patient On (Oxygen Delivery Method): room air            I&O's Summary    PHYSICAL EXAM  General Appearance: cooperative, no acute distress,   HEENT: PERRL, conjunctiva clear, EOM's intact, non injected pharynx, no exudate, TM   normal  Neck: Supple, , no adenopathy, thyroid: not enlarged, no carotid bruit or JVD  Back: Symmetric, no  tenderness,no soft tissue tenderness  Lungs: diminished at the bases, cough is present with exhalation   Heart: Regular rate and rhythm, S1, S2 normal, no murmur, rub or gallop  Abdomen: Soft, non-tender, bowel sounds active , no hepatosplenomegaly  Extremities: no cyanosis or edema, no joint swelling  Skin: Skin color, texture normal, no rashes   Neurologic: Alert and oriented X3 , cranial nerves intact, sensory and motor normal,    ECG:    LABS:                          8.5    5.42  )-----------( 216      ( 23 Jul 2023 06:46 )             24.0     07-23    135  |  100  |  9   ----------------------------<  119<H>  3.0<L>   |  29  |  0.66    Ca    7.5<L>      23 Jul 2023 06:46  Phos  2.8     07-23  Mg     1.9     07-23    TPro  5.8<L>  /  Alb  1.6<L>  /  TBili  1.1  /  DBili  x   /  AST  128<H>  /  ALT  121<H>  /  AlkPhos  105  07-23    CARDIAC MARKERS ( 22 Jul 2023 06:45 )  x     / x     / 169 U/L / x     / x                Urinalysis Basic - ( 23 Jul 2023 06:46 )    Color: x / Appearance: x / SG: x / pH: x  Gluc: 119 mg/dL / Ketone: x  / Bili: x / Urobili: x   Blood: x / Protein: x / Nitrite: x   Leuk Esterase: x / RBC: x / WBC x   Sq Epi: x / Non Sq Epi: x / Bacteria: x            RADIOLOGY & ADDITIONAL STUDIES:

## 2023-07-25 NOTE — PROGRESS NOTE ADULT - SUBJECTIVE AND OBJECTIVE BOX
Date of service: 07-25-23 @ 14:06    pt seen and examined  temp curve improving  has cough  feels better this am but still weak    ROS: denies dizziness, no HA, no abdominal pain, no diarrhea or constipation; no dysuria, no urinary frequency, no legs pain, no rashes      MEDICATIONS  (STANDING):  albuterol    0.083% 2.5 milliGRAM(s) Nebulizer every 8 hours  albuterol    90 MICROgram(s) HFA Inhaler 1 Puff(s) Inhalation every 4 hours  atovaquone  Suspension 750 milliGRAM(s) Oral two times a day  azithromycin  IVPB 500 milliGRAM(s) IV Intermittent every 24 hours  budesonide 160 MICROgram(s)/formoterol 4.5 MICROgram(s) Inhaler 2 Puff(s) Inhalation two times a day  cefTRIAXone Injectable.      cefTRIAXone Injectable. 1000 milliGRAM(s) IV Push every 24 hours  cyanocobalamin 1000 MICROGram(s) Oral daily  guaiFENesin Oral Liquid (Sugar-Free) 200 milliGRAM(s) Oral every 6 hours  magnesium oxide 400 milliGRAM(s) Oral at bedtime  multivitamin 1 Tablet(s) Oral daily  saccharomyces boulardii 250 milliGRAM(s) Oral two times a day    Vital Signs Last 24 Hrs  T(C): 36.9 (25 Jul 2023 19:40), Max: 38.9 (25 Jul 2023 10:19)  T(F): 98.4 (25 Jul 2023 19:40), Max: 102 (25 Jul 2023 10:19)  HR: 71 (25 Jul 2023 19:40) (61 - 80)  BP: 106/61 (25 Jul 2023 19:40) (93/57 - 119/70)  BP(mean): --  RR: 18 (25 Jul 2023 19:40) (18 - 18)  SpO2: 99% (25 Jul 2023 19:40) (91% - 99%)    Parameters below as of 25 Jul 2023 19:40  Patient On (Oxygen Delivery Method): nasal cannula  O2 Flow (L/min): 2.5            PE:  Constitutional: NAD   HEENT: NC/AT, EOMI, PERRLA, conjunctivae clear; ears and nose atraumatic; pharynx benign  Neck: supple; thyroid not palpable  Back: no tenderness  Respiratory: decreased breath sounds   Cardiovascular: S1S2 regular, no murmurs  Abdomen: soft, not tender, not distended, positive BS; liver and spleen WNL  Genitourinary: no suprapubic tenderness  Lymphatic: no LN palpable  Musculoskeletal: no muscle tenderness, no joint swelling or tenderness  Extremities: no pedal edema  Neurological/ Psychiatric: AxOx3, Judgement and insight normal;  moving all extremities  Skin: no rashes; no palpable lesions    Labs: all available labs reviewed                                              9.4    7.32  )-----------( 486      ( 25 Jul 2023 07:56 )             27.2     07-25    135  |  100  |  7   ----------------------------<  96  3.6   |  34<H>  |  0.73    Ca    8.4<L>      25 Jul 2023 07:56  Phos  3.2     07-25  Mg     2.1     07-25    TPro  7.2  /  Alb  1.9<L>  /  TBili  1.3<H>  /  DBili  x   /  AST  137<H>  /  ALT  141<H>  /  AlkPhos  116  07-25        Urinalysis Basic - ( 20 Jul 2023 06:14 )    Color: x / Appearance: x / SG: x / pH: x  Gluc: 104 mg/dL / Ketone: x  / Bili: x / Urobili: x   Blood: x / Protein: x / Nitrite: x   Leuk Esterase: x / RBC: x / WBC x   Sq Epi: x / Non Sq Epi: x / Bacteria: x    Parasitemia, Blood (07.20.23 @ 06:14)   Culture Results:   Babesia species   by Giemsa Stain   Parasitemia = 5.0% --> 1.0% --> <1%  Culture - Blood (07.19.23 @ 15:38)   Specimen Source: .Blood None  Culture Results:   No growth at 24 hours      Radiology: all available radiological tests reviewed  < from: CT Abdomen and Pelvis w/ IV Cont (07.19.23 @ 14:54) >    ACC: [id number] EXAM:  CT ABDOMEN AND PELVIS IC   ORDERED BY: JAY CHA     PROCEDURE DATE:  07/19/2023          INTERPRETATION:  CLINICAL INFORMATION: Abdominal pain    COMPARISON: None.    CONTRAST/COMPLICATIONS:  IV Contrast: Omnipaque 350  90cc administered   10 cc discarded  Oral Contrast: NONE  Complications: None reported at time of study completion    PROCEDURE:  CT of the Abdomen and Pelvis was performed.  Sagittal and coronal reformats were performed.    FINDINGS:  LOWER CHEST: Small bilateral atelectasis.    LIVER: Indeterminate 1.4 cm hypodense lesion in the right hepatic lobe.   If clinically indicated, nonemergent abdominal MR without and with IV   contrast may be pursued for further evaluation.  BILE DUCTS: Normal caliber.  GALLBLADDER: No CT evidence for gallstones. Apparent nonspecific trace   pericholecystic fluid. If clinically indicated, gallbladder ultrasound   may be pursued for further evaluation.  SPLEEN: Within normal limits.  PANCREAS: Within normal limits.  ADRENALS: Within normal limits.  KIDNEYS/URETERS: Within normal limits except for a small hypodense lesion   in the left kidney, too small to characterize.    BLADDER: Within normal limits.  REPRODUCTIVE ORGANS: The uterus and adnexa appear grossly unremarkable.    BOWEL: No bowel obstruction or grossly thickened bowel wall. Appendix   within normal limits.  PERITONEUM: No free air or ascites.  VESSELS: Within normal limits.  RETROPERITONEUM/LYMPH NODES: No lymphadenopathy.  ABDOMINAL WALL: Tiny fat-containing umbilical hernia.  BONES: Mild degenerative spondylosis. Grade 1 anterolisthesis at L4-5.    IMPRESSION:  No CT evidence for gallstones. Apparent nonspecific trace pericholecystic   fluid. If clinically indicated, gallbladder ultrasoundmay be pursued for   further evaluation.    Indeterminate 1.4 cm hypodense lesion in the right hepatic lobe. If   clinically indicated, nonemergent abdominal MR without and with IV   contrast may be pursued for further evaluation.      ACC: [id number] EXAM:  US ABDOMEN RT UPR QUADRANT   ORDERED BY: JAY CHA     PROCEDURE DATE:  07/19/2023          INTERPRETATION:  CLINICAL INFORMATION: Right upper quadrant pain, fever    COMPARISON: CT of the abdomen and pelvis from 7/19/2023    TECHNIQUE: Sonography of the right upper quadrant.    FINDINGS:  Liver: Within normal limits in echogenicity and size. There is a 1.5 x   1.3 x 1.3 cm echogenic lesion in the right hepatic lobe, presumably a   hepatic hemangioma, however not characterized on the current exam. The   visualized main portal vein appears patent with normal direction of flow.  Bile ducts: Normal caliber. Common bile duct measures 2 mm.  Gallbladder: No gallstones are seen within the gallbladder.  Negative   sonographic Almeida's sign. There is mild, focal anterior gallbladder wall   thickening/edema, up to 4.5 mm,  located between the gallbladder and the   liver, as was suggested on recent prior CT. The remainder of the   gallbladder wall appears within normal limits.  Pancreas: Visualized portions are within normal limits.  Right kidney: 11.6 cm. No hydronephrosis.  Ascites: None.  Aorta/IVC: Visualized portions are within normal limits.    IMPRESSION:  Nonspecific, mild, focal anterior gallbladder wall thickening/edema   located between the liver and gallbladder.  Remainder of the gallbladder   appears within normal limits. No gallstones are seen. Negative   sonographic Almeida sign. No intra or extrahepatic biliary ductal   dilatation. Finding is nonspecific.  Question hepatitis or other   secondary cause for mild gallbladder wall thickening. Recommend clinical   correlation.  Short interval repeat exam or MRI/MRCP can be obtained for   additional evaluation as clinically indicated.    There is a 1.5 x 1.3 x 1.3 cm echogenic lesion in the right hepatic lobe,   presumably a hepatic hemangioma, however not characterized on the current   exam. If clinically indicated, abdominal MR without and with IV contrast   may be pursued for further evaluation, when clinically feasible.        Advanced directives addressed: full resuscitation

## 2023-07-25 NOTE — PROGRESS NOTE ADULT - ASSESSMENT
52 year old female PMHx significant for ulcerative colitis presents to the [facility] on 7/19/23  for further evaluation and management of symptoms of progressively worsening generalized weakness, malaise/fatigue, myalgias, subjective fevers (TMax at home 103.7'F), generalized abdominal discomfort and decreased PO intake which began on Friday (7/14).  The patient states that her symptoms progressed despite taking Tylenol and Motrin prn. Of note the patient reportedly tested negative for Influenza and COVID-19. Upon triage the patient met sepsis criteria => Vital signs in triage => BP 86/53, HR 70/min, RR 27/min, SpO2 97% on room air. In the ED CCM was consulted to further evaluate.     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 Azithromycin 500mg IVPB daily needs 10 days course , day 5 in the hospital  - cont. anti-pyretics prn  - tylenol alternating with ibuprofen  - 7/24 - 1 unit of PRBC with lasix 20 after   7/25 - Hb better, no role for exchange tf at this time     Acute hypoxic respiratory failure due to   atelectasis,  and bilateral gram negative lisa Pneumonia, not noted on admission   - CT chest - mild septal thickening in both lungs - ? edema, vs infection   - CTA 7/23 - New bilateral lower lobe consolidation,  doppler LE - no DVT    - 7/22 added Ceftriaxone  - 7/23 - s/p low dose lasix 20 mg   7/25 - Cont rocephin     1.4 cm right hepatic lesion with transaminitis   - MRCP - likely hemangioma , o/p repeat test   - CT abd - noted  - hepatitis panel neg     Hypovolumic hyponatremia, Hypoalbuminemia - monitor Na    Hypokalemia, Hypophosphatemia -  replace    Hypocalcemia  -  vit D wnl     UC - add probiotics; stable, not on immunosuppressants  Anxiety ~cont. Clonazepam 0.5mg po qhs prn  ADHD ~will hold Dextroamphetamine-Amphetamine (takes 10mg po daily), takes Dextroamphetamine-Amphetamine ER 20mg daily prn  VTE PX ~cont. SCDs for now CTA - neg for PE, no DVT     DISPO  - abx, antipyretics , inpatient monitoring until cleared by ID and pulmonary and oncology  POC discussed with family as well

## 2023-07-25 NOTE — PROGRESS NOTE ADULT - ASSESSMENT
Babesiosis/ decreasing parasitemia   Anemia/ improved with transfusion  Pneumonia / consolidations/ on antibiotics   Transaminitis    PLAN    Monitor H/H   Antibiotics  Respiratory support   Discussed criteria for exchange transfusion / improving H/H / declining Parasitemia encouraging      Babesiosis/ decreasing parasitemia   Anemia/ improved with transfusion  Pneumonia / consolidations/ on antibiotics   Transaminitis    PLAN    Monitor H/H   Antibiotics  Respiratory support   Discussed criteria for exchange transfusion / improving H/H / declining Parasitemia encouraging  Discussed pulm findings with Dr Rosario/ atelectasis / pneumonia

## 2023-07-25 NOTE — PROGRESS NOTE ADULT - SUBJECTIVE AND OBJECTIVE BOX
INTERVAL HISTORY:    Patient with Babesiosis, Anemia , Bilateral Pulm lower lobe consolidations / mild effusions ( Pneumonia as per Pulm/ ID ) S/P transfusion 7/24 7/23 CT bilateral consolidations increased from 7/20   On Symbicort / Mepron / Azithromycin           REVIEW OF SYSTEMS:    Feeling weak/ dyspnea with exertion   No fevers   No abdominal pains       Allergies    No Known Allergies    Intolerances        MEDICATIONS  (STANDING):  albuterol    0.083% 2.5 milliGRAM(s) Nebulizer every 8 hours  albuterol    90 MICROgram(s) HFA Inhaler 1 Puff(s) Inhalation every 4 hours  atovaquone  Suspension 750 milliGRAM(s) Oral two times a day  azithromycin  IVPB 500 milliGRAM(s) IV Intermittent every 24 hours  budesonide 160 MICROgram(s)/formoterol 4.5 MICROgram(s) Inhaler 2 Puff(s) Inhalation two times a day  cefTRIAXone Injectable.      cefTRIAXone Injectable. 1000 milliGRAM(s) IV Push every 24 hours  cyanocobalamin 1000 MICROGram(s) Oral daily  guaiFENesin Oral Liquid (Sugar-Free) 200 milliGRAM(s) Oral every 6 hours  magnesium oxide 400 milliGRAM(s) Oral at bedtime  multivitamin 1 Tablet(s) Oral daily  potassium phosphate / sodium phosphate Tablet (K-PHOS No. 2) 1 Tablet(s) Oral three times a day  saccharomyces boulardii 250 milliGRAM(s) Oral two times a day    MEDICATIONS  (PRN):  acetaminophen     Tablet .. 650 milliGRAM(s) Oral every 6 hours PRN Temp greater or equal to 38C (100.4F), Mild Pain (1 - 3)  aluminum hydroxide/magnesium hydroxide/simethicone Suspension 30 milliLiter(s) Oral every 4 hours PRN Dyspepsia  benzocaine/menthol Lozenge 1 Lozenge Oral four times a day PRN Sore Throat  clonazePAM  Tablet 0.5 milliGRAM(s) Oral at bedtime PRN Insomnia  ibuprofen  Tablet. 400 milliGRAM(s) Oral every 6 hours PRN Temp greater or equal to 38.5C (101.3F)  melatonin 3 milliGRAM(s) Oral at bedtime PRN Insomnia  ondansetron Injectable 4 milliGRAM(s) IV Push every 8 hours PRN Nausea and/or Vomiting      Vital Signs Last 24 Hrs  T(C): 36.8 (25 Jul 2023 08:03), Max: 38.4 (24 Jul 2023 15:18)  T(F): 98.2 (25 Jul 2023 08:03), Max: 101.1 (24 Jul 2023 15:18)  HR: 71 (25 Jul 2023 08:03) (71 - 82)  BP: 119/70 (25 Jul 2023 08:03) (88/58 - 119/70)  BP(mean): --  RR: 18 (25 Jul 2023 08:03) (16 - 18)  SpO2: 96% (25 Jul 2023 08:03) (91% - 96%)    Parameters below as of 25 Jul 2023 08:03  Patient On (Oxygen Delivery Method): room air        PHYSICAL EXAM:    GENERAL: NAD,   HEAD:  Atraumatic, Normocephalic  EYES: EOMI, PERRLA, conjunctiva and sclera clear    NECK: Supple, No JVD, Normal thyroid  NERVOUS SYSTEM:    CHEST/LUNG: Lower lobe dullness percussion  HEART: Regular rate and rhythm;   ABDOMEN: Soft, Nontender.  EXTREMITIES:   edema:-  LYMPH: No lymphadenopathy noted        LABS:                        9.4    7.32  )-----------( 486      ( 25 Jul 2023 07:56 )             27.2     07-25    135  |  100  |  7   ----------------------------<  96  3.6   |  34<H>  |  0.73    Ca    8.4<L>      25 Jul 2023 07:56  Phos  3.2     07-25  Mg     2.1     07-25    TPro  7.2  /  Alb  1.9<L>  /  TBili  1.3<H>  /  DBili  x   /  AST  137<H>  /  ALT  141<H>  /  AlkPhos  116  07-25      Babesiosis 7/24 < 1 %     Urinalysis Basic - ( 25 Jul 2023 07:56 )    Color: x / Appearance: x / SG: x / pH: x  Gluc: 96 mg/dL / Ketone: x  / Bili: x / Urobili: x   Blood: x / Protein: x / Nitrite: x   Leuk Esterase: x / RBC: x / WBC x   Sq Epi: x / Non Sq Epi: x / Bacteria: x      RADIOLOGY & ADDITIONAL STUDIES:    < from: CT Angio Chest PE Protocol w/ IV Cont (07.23.23 @ 15:05) >  ACC: [id number] EXAM:  CT ANGIO CHEST PULM ART WAWI   ORDERED BY: ARACELI MCINTYRE     PROCEDURE DATE:  07/23/2023          INTERPRETATION:  CLINICAL INFORMATION: Shortness of breath.    COMPARISON: Noncontrast CT of the thorax July 20, 2023.    CONTRAST/COMPLICATIONS:  IV Contrast: Omnipaque 350  68 cc administered   32 cc discarded  Oral Contrast: NONE  Complications: None reported at time of study completion    PROCEDURE:  CT Angiography of the Chest.  Sagittal and coronal reformats were performed as well as 3D (MIP)   reconstructions.    FINDINGS:    LUNGS AND AIRWAYS: Patent central airways.  New extensive consolidation   throughout the basilar segments of the bilateral lower lobes, right   greater than left. Calcified granuloma in the superior segment of the   right lower lobe. New mild patchy nodular opacity at the right lung apex.   New patchy groundglass and consolidative opacity in the infrahilar right   middle lobe and perihilar left upper lobe.  PLEURA: Mild bilateral layering pleural effusions. No pneumothorax or   pneumomediastinum.  MEDIASTINUM AND REECE: No lymphadenopathy.  VESSELS: There is no evidence of filling defect in the first, second, or   third order branches of the pulmonary arteries. The pulmonary trunk and   main pulmonary arteries are unremarkable. The thoracic aorta and great   vessels are unremarkable.  HEART: Heart size is normal. No pericardial effusion.  CHEST WALL AND LOWER NECK: Within normal limits.  VISUALIZED UPPER ABDOMEN: Within normal limits.  BONES: Within normal limits.    IMPRESSION:  1. New bilateral lower lobe consolidation and additional scattered   consolidative opacities, as described. Recommend plain film follow-up.  2. No evidence of pulmonary embolism.    < end of copied text >               INTERVAL HISTORY:    Patient with Babesiosis, Anemia , Bilateral Pulm lower lobe consolidations / mild effusions ( Pneumonia as per Pulm/ ID ) S/P transfusion 7/24 7/23 CT bilateral consolidations increased from 7/20   On Symbicort / Mepron / Azithromycin     REVIEW OF SYSTEMS:    Feeling weak/ dyspnea with exertion   No fevers   No abdominal pains     Allergies    No Known Allergies    Intolerances        MEDICATIONS  (STANDING):  albuterol    0.083% 2.5 milliGRAM(s) Nebulizer every 8 hours  albuterol    90 MICROgram(s) HFA Inhaler 1 Puff(s) Inhalation every 4 hours  atovaquone  Suspension 750 milliGRAM(s) Oral two times a day  azithromycin  IVPB 500 milliGRAM(s) IV Intermittent every 24 hours  budesonide 160 MICROgram(s)/formoterol 4.5 MICROgram(s) Inhaler 2 Puff(s) Inhalation two times a day  cefTRIAXone Injectable.      cefTRIAXone Injectable. 1000 milliGRAM(s) IV Push every 24 hours  cyanocobalamin 1000 MICROGram(s) Oral daily  guaiFENesin Oral Liquid (Sugar-Free) 200 milliGRAM(s) Oral every 6 hours  magnesium oxide 400 milliGRAM(s) Oral at bedtime  multivitamin 1 Tablet(s) Oral daily  potassium phosphate / sodium phosphate Tablet (K-PHOS No. 2) 1 Tablet(s) Oral three times a day  saccharomyces boulardii 250 milliGRAM(s) Oral two times a day    MEDICATIONS  (PRN):  acetaminophen     Tablet .. 650 milliGRAM(s) Oral every 6 hours PRN Temp greater or equal to 38C (100.4F), Mild Pain (1 - 3)  aluminum hydroxide/magnesium hydroxide/simethicone Suspension 30 milliLiter(s) Oral every 4 hours PRN Dyspepsia  benzocaine/menthol Lozenge 1 Lozenge Oral four times a day PRN Sore Throat  clonazePAM  Tablet 0.5 milliGRAM(s) Oral at bedtime PRN Insomnia  ibuprofen  Tablet. 400 milliGRAM(s) Oral every 6 hours PRN Temp greater or equal to 38.5C (101.3F)  melatonin 3 milliGRAM(s) Oral at bedtime PRN Insomnia  ondansetron Injectable 4 milliGRAM(s) IV Push every 8 hours PRN Nausea and/or Vomiting      Vital Signs Last 24 Hrs  T(C): 36.8 (25 Jul 2023 08:03), Max: 38.4 (24 Jul 2023 15:18)  T(F): 98.2 (25 Jul 2023 08:03), Max: 101.1 (24 Jul 2023 15:18)  HR: 71 (25 Jul 2023 08:03) (71 - 82)  BP: 119/70 (25 Jul 2023 08:03) (88/58 - 119/70)  BP(mean): --  RR: 18 (25 Jul 2023 08:03) (16 - 18)  SpO2: 96% (25 Jul 2023 08:03) (91% - 96%)    Parameters below as of 25 Jul 2023 08:03  Patient On (Oxygen Delivery Method): room air        PHYSICAL EXAM:    GENERAL: NAD,   HEAD:  Atraumatic, Normocephalic  EYES: EOMI, PERRLA, conjunctiva and sclera clear    NECK: Supple, No JVD, Normal thyroid  NERVOUS SYSTEM:    CHEST/LUNG: Lower lobe dullness percussion  HEART: Regular rate and rhythm;   ABDOMEN: Soft, Nontender.  EXTREMITIES:   edema:-  LYMPH: No lymphadenopathy noted        LABS:                        9.4    7.32  )-----------( 486      ( 25 Jul 2023 07:56 )             27.2     07-25    135  |  100  |  7   ----------------------------<  96  3.6   |  34<H>  |  0.73    Ca    8.4<L>      25 Jul 2023 07:56  Phos  3.2     07-25  Mg     2.1     07-25    TPro  7.2  /  Alb  1.9<L>  /  TBili  1.3<H>  /  DBili  x   /  AST  137<H>  /  ALT  141<H>  /  AlkPhos  116  07-25      Babesiosis 7/24 < 1 %     Urinalysis Basic - ( 25 Jul 2023 07:56 )    Color: x / Appearance: x / SG: x / pH: x  Gluc: 96 mg/dL / Ketone: x  / Bili: x / Urobili: x   Blood: x / Protein: x / Nitrite: x   Leuk Esterase: x / RBC: x / WBC x   Sq Epi: x / Non Sq Epi: x / Bacteria: x      RADIOLOGY & ADDITIONAL STUDIES:    < from: CT Angio Chest PE Protocol w/ IV Cont (07.23.23 @ 15:05) >  ACC: [id number] EXAM:  CT ANGIO CHEST PULM ART WAWI   ORDERED BY: ARACELI MCINTYRE     PROCEDURE DATE:  07/23/2023          INTERPRETATION:  CLINICAL INFORMATION: Shortness of breath.    COMPARISON: Noncontrast CT of the thorax July 20, 2023.    CONTRAST/COMPLICATIONS:  IV Contrast: Omnipaque 350  68 cc administered   32 cc discarded  Oral Contrast: NONE  Complications: None reported at time of study completion    PROCEDURE:  CT Angiography of the Chest.  Sagittal and coronal reformats were performed as well as 3D (MIP)   reconstructions.    FINDINGS:    LUNGS AND AIRWAYS: Patent central airways.  New extensive consolidation   throughout the basilar segments of the bilateral lower lobes, right   greater than left. Calcified granuloma in the superior segment of the   right lower lobe. New mild patchy nodular opacity at the right lung apex.   New patchy groundglass and consolidative opacity in the infrahilar right   middle lobe and perihilar left upper lobe.  PLEURA: Mild bilateral layering pleural effusions. No pneumothorax or   pneumomediastinum.  MEDIASTINUM AND REECE: No lymphadenopathy.  VESSELS: There is no evidence of filling defect in the first, second, or   third order branches of the pulmonary arteries. The pulmonary trunk and   main pulmonary arteries are unremarkable. The thoracic aorta and great   vessels are unremarkable.  HEART: Heart size is normal. No pericardial effusion.  CHEST WALL AND LOWER NECK: Within normal limits.  VISUALIZED UPPER ABDOMEN: Within normal limits.  BONES: Within normal limits.    IMPRESSION:  1. New bilateral lower lobe consolidation and additional scattered   consolidative opacities, as described. Recommend plain film follow-up.  2. No evidence of pulmonary embolism.    < end of copied text >

## 2023-07-25 NOTE — PROGRESS NOTE ADULT - SUBJECTIVE AND OBJECTIVE BOX
52 year old female PMHx significant for ulcerative colitis presents to the [facility] on 7/19/23  for further evaluation and management of symptoms of progressively worsening generalized weakness, malaise/fatigue, myalgias, subjective fevers (TMax at home 103.7'F), generalized abdominal discomfort and decreased PO intake which began on Friday (7/14).  The patient states that her symptoms progressed despite taking Tylenol and Motrin prn. Of note the patient reportedly tested negative for Influenza and COVID-19. Upon triage the patient met sepsis criteria => Vital signs in triage => BP 86/53, HR 70/min, RR 27/min, SpO2 97% on room air. In the ED CCM was consulted to further evaluate.     7/20 - Patient seen and examined at bedside earlier today, + generalized chills, aches, poor po intake, generalized abdominal discomfort   7/21 - pt seen and examined in am, + febrile, + muscle aches, denies cp, + cough persist, denies cp, abdominal pain , + lose bm  7/22 - no events, still febrile, + cough, dyspnea overnight , denies cp, abdominal pain, eager to go home  7/23 - + hypoxia overnight on 4 L now, + cough, + dyspnea, + pleuritic chest pain, denies abdominal pain, plan discussed  7/24 - events noted episodes of hypoxemia, + cough, + pleuritic chest pain persist, denies abdominal pain, tolerating po intake, agreeable for blood transfusion  7/25 - no cp palps. mild cough, dry. shortness of breath requiring O2 via NC     PHYSICAL EXAM:  T(F): 96.8 (25 Jul 2023 15:40), Max: 100 (25 Jul 2023 00:30)  HR: 61 (25 Jul 2023 17:15) (61 - 75)  BP: 93/57 (25 Jul 2023 15:40) (93/57 - 119/70)  RR: 18 (25 Jul 2023 15:40) (18 - 18)  SpO2: 97% (25 Jul 2023 17:15) (91% - 98%) nasal cannula, 3 lpm  GENERAL: NAD, able to lie flat in bed  HEAD:  Atraumatic, Normocephalic  EYES: EOMI, PERRLA, normal sclera  ENT: Moist mucous membranes  NECK: Supple, No JVD, no nuchal rigidity  CHEST/LUNG: Clear to auscultation bilaterally; No rales, rhonchi, wheezing, or rubs. Unlabored respirations  HEART: Regular rate and rhythm; No murmurs, rubs, or gallops  ABDOMEN: Bowel sounds present; Soft, Nontender, Nondistended. No hepatomegaly  EXTREMITIES:  no pitting bilaterally  NERVOUS SYSTEM:  Alert & Oriented X3, speech clear. No focal motor or sensory deficits  MSK: FROM all 4 extremities, full and equal strength  SKIN: No rashes or lesions    RAD  < from: US Duplex Venous Lower Ext Complete, Bilateral (07.23.23 @ 17:20) >  No evidence of deep venous thrombosis in either lower extremity.  < from: CT Angio Chest PE Protocol w/ IV Cont (07.23.23 @ 15:05) >  1. New bilateral lower lobe consolidation and additional scattered   consolidative opacities, as described. Recommend plain film follow-up.  2. No evidence of pulmonary embolism.    LABS: All Labs Reviewed:                     9.4    7.32  )-----------( 486      ( 25 Jul 2023 07:56 )             27.2   135  |  100  |  7   ----------------------------<  96  3.6   |  34<H>  |  0.73    Parasitemia, Blood (collected 07-25-23 @ 07:56)  Source: .Blood None  Final Report (07-25-23 @ 14:36):    Babesia species    by Giemsa Stain    Parasitemia = < 1 %    MEDS:   acetaminophen     Tablet .. 650 milliGRAM(s) Oral every 6 hours PRN  albuterol    0.083% 2.5 milliGRAM(s) Nebulizer every 8 hours  albuterol    90 MICROgram(s) HFA Inhaler 1 Puff(s) Inhalation every 4 hours  aluminum hydroxide/magnesium hydroxide/simethicone Suspension 30 milliLiter(s) Oral every 4 hours PRN  atovaquone  Suspension 750 milliGRAM(s) Oral two times a day  azithromycin  IVPB 500 milliGRAM(s) IV Intermittent every 24 hours  benzocaine/menthol Lozenge 1 Lozenge Oral four times a day PRN  budesonide 160 MICROgram(s)/formoterol 4.5 MICROgram(s) Inhaler 2 Puff(s) Inhalation two times a day  cefTRIAXone Injectable.      cefTRIAXone Injectable. 1000 milliGRAM(s) IV Push every 24 hours  clonazePAM  Tablet 0.5 milliGRAM(s) Oral at bedtime PRN  cyanocobalamin 1000 MICROGram(s) Oral daily  guaiFENesin Oral Liquid (Sugar-Free) 200 milliGRAM(s) Oral every 6 hours  ibuprofen  Tablet. 400 milliGRAM(s) Oral every 6 hours PRN  magnesium oxide 400 milliGRAM(s) Oral at bedtime  melatonin 3 milliGRAM(s) Oral at bedtime PRN  multivitamin 1 Tablet(s) Oral daily  ondansetron Injectable 4 milliGRAM(s) IV Push every 8 hours PRN  saccharomyces boulardii 250 milliGRAM(s) Oral two times a day

## 2023-07-26 LAB
ADD ON TEST-SPECIMEN IN LAB: SIGNIFICANT CHANGE UP
ADD ON TEST-SPECIMEN IN LAB: SIGNIFICANT CHANGE UP
ANION GAP SERPL CALC-SCNC: 4 MMOL/L — LOW (ref 5–17)
BUN SERPL-MCNC: 7 MG/DL — SIGNIFICANT CHANGE UP (ref 7–23)
CALCIUM SERPL-MCNC: 8.1 MG/DL — LOW (ref 8.5–10.1)
CHLORIDE SERPL-SCNC: 103 MMOL/L — SIGNIFICANT CHANGE UP (ref 96–108)
CO2 SERPL-SCNC: 29 MMOL/L — SIGNIFICANT CHANGE UP (ref 22–31)
CREAT SERPL-MCNC: 0.64 MG/DL — SIGNIFICANT CHANGE UP (ref 0.5–1.3)
CULTURE RESULTS: SIGNIFICANT CHANGE UP
EGFR: 106 ML/MIN/1.73M2 — SIGNIFICANT CHANGE UP
GLUCOSE SERPL-MCNC: 100 MG/DL — HIGH (ref 70–99)
HAPTOGLOB SERPL-MCNC: <20 MG/DL — LOW (ref 34–200)
HCT VFR BLD CALC: 23.8 % — LOW (ref 34.5–45)
HGB BLD-MCNC: 8.2 G/DL — LOW (ref 11.5–15.5)
LDH SERPL L TO P-CCNC: 1036 U/L — HIGH (ref 84–241)
MAGNESIUM SERPL-MCNC: 2.2 MG/DL — SIGNIFICANT CHANGE UP (ref 1.6–2.6)
MCHC RBC-ENTMCNC: 28.9 PG — SIGNIFICANT CHANGE UP (ref 27–34)
MCHC RBC-ENTMCNC: 34.5 GM/DL — SIGNIFICANT CHANGE UP (ref 32–36)
MCV RBC AUTO: 83.8 FL — SIGNIFICANT CHANGE UP (ref 80–100)
NT-PROBNP SERPL-SCNC: 216 PG/ML — HIGH (ref 0–125)
PHOSPHATE SERPL-MCNC: 3.5 MG/DL — SIGNIFICANT CHANGE UP (ref 2.5–4.5)
PLATELET # BLD AUTO: 530 K/UL — HIGH (ref 150–400)
POTASSIUM SERPL-MCNC: 4 MMOL/L — SIGNIFICANT CHANGE UP (ref 3.5–5.3)
POTASSIUM SERPL-SCNC: 4 MMOL/L — SIGNIFICANT CHANGE UP (ref 3.5–5.3)
RBC # BLD: 2.84 M/UL — LOW (ref 3.8–5.2)
RBC # FLD: 15 % — HIGH (ref 10.3–14.5)
RETICS #: 58.6 K/UL — SIGNIFICANT CHANGE UP (ref 25–125)
RETICS/RBC NFR: 2.1 % — SIGNIFICANT CHANGE UP (ref 0.5–2.5)
SODIUM SERPL-SCNC: 136 MMOL/L — SIGNIFICANT CHANGE UP (ref 135–145)
SPECIMEN SOURCE: SIGNIFICANT CHANGE UP
WBC # BLD: 6.88 K/UL — SIGNIFICANT CHANGE UP (ref 3.8–10.5)
WBC # FLD AUTO: 6.88 K/UL — SIGNIFICANT CHANGE UP (ref 3.8–10.5)

## 2023-07-26 PROCEDURE — 99233 SBSQ HOSP IP/OBS HIGH 50: CPT

## 2023-07-26 PROCEDURE — 71045 X-RAY EXAM CHEST 1 VIEW: CPT | Mod: 26

## 2023-07-26 RX ORDER — LIDOCAINE 4 G/100G
1 CREAM TOPICAL DAILY
Refills: 0 | Status: DISCONTINUED | OUTPATIENT
Start: 2023-07-26 | End: 2023-08-01

## 2023-07-26 RX ORDER — FUROSEMIDE 40 MG
40 TABLET ORAL DAILY
Refills: 0 | Status: COMPLETED | OUTPATIENT
Start: 2023-07-26 | End: 2023-07-30

## 2023-07-26 RX ADMIN — Medication 1 TABLET(S): at 10:11

## 2023-07-26 RX ADMIN — Medication 200 MILLIGRAM(S): at 05:26

## 2023-07-26 RX ADMIN — BUDESONIDE AND FORMOTEROL FUMARATE DIHYDRATE 2 PUFF(S): 160; 4.5 AEROSOL RESPIRATORY (INHALATION) at 20:37

## 2023-07-26 RX ADMIN — PREGABALIN 1000 MICROGRAM(S): 225 CAPSULE ORAL at 10:11

## 2023-07-26 RX ADMIN — Medication 250 MILLIGRAM(S): at 10:11

## 2023-07-26 RX ADMIN — Medication 200 MILLIGRAM(S): at 12:01

## 2023-07-26 RX ADMIN — BUDESONIDE AND FORMOTEROL FUMARATE DIHYDRATE 2 PUFF(S): 160; 4.5 AEROSOL RESPIRATORY (INHALATION) at 09:06

## 2023-07-26 RX ADMIN — Medication 3 MILLIGRAM(S): at 21:20

## 2023-07-26 RX ADMIN — LIDOCAINE 1 PATCH: 4 CREAM TOPICAL at 11:58

## 2023-07-26 RX ADMIN — Medication 200 MILLIGRAM(S): at 18:03

## 2023-07-26 RX ADMIN — ALBUTEROL 2.5 MILLIGRAM(S): 90 AEROSOL, METERED ORAL at 09:04

## 2023-07-26 RX ADMIN — Medication 40 MILLIGRAM(S): at 10:10

## 2023-07-26 RX ADMIN — ATOVAQUONE 750 MILLIGRAM(S): 750 SUSPENSION ORAL at 10:12

## 2023-07-26 RX ADMIN — MAGNESIUM OXIDE 400 MG ORAL TABLET 400 MILLIGRAM(S): 241.3 TABLET ORAL at 21:20

## 2023-07-26 RX ADMIN — CEFTRIAXONE 1000 MILLIGRAM(S): 500 INJECTION, POWDER, FOR SOLUTION INTRAMUSCULAR; INTRAVENOUS at 10:10

## 2023-07-26 RX ADMIN — ATOVAQUONE 750 MILLIGRAM(S): 750 SUSPENSION ORAL at 21:21

## 2023-07-26 RX ADMIN — ALBUTEROL 2.5 MILLIGRAM(S): 90 AEROSOL, METERED ORAL at 19:02

## 2023-07-26 RX ADMIN — Medication 250 MILLIGRAM(S): at 21:20

## 2023-07-26 RX ADMIN — AZITHROMYCIN 255 MILLIGRAM(S): 500 TABLET, FILM COATED ORAL at 14:05

## 2023-07-26 NOTE — PROGRESS NOTE ADULT - SUBJECTIVE AND OBJECTIVE BOX
52 year old female PMHx significant for ulcerative colitis presents to the [facility] on 7/19/23  for further evaluation and management of symptoms of progressively worsening generalized weakness, malaise/fatigue, myalgias, subjective fevers (TMax at home 103.7'F), generalized abdominal discomfort and decreased PO intake which began on Friday (7/14).  The patient states that her symptoms progressed despite taking Tylenol and Motrin prn. Of note the patient reportedly tested negative for Influenza and COVID-19. Upon triage the patient met sepsis criteria => Vital signs in triage => BP 86/53, HR 70/min, RR 27/min, SpO2 97% on room air. In the ED CCM was consulted to further evaluate.     7/20 - Patient seen and examined at bedside earlier today, + generalized chills, aches, poor po intake, generalized abdominal discomfort   7/21 - pt seen and examined in am, + febrile, + muscle aches, denies cp, + cough persist, denies cp, abdominal pain , + lose bm  7/22 - no events, still febrile, + cough, dyspnea overnight , denies cp, abdominal pain, eager to go home  7/23 - + hypoxia overnight on 4 L now, + cough, + dyspnea, + pleuritic chest pain, denies abdominal pain, plan discussed  7/24 - events noted episodes of hypoxemia, + cough, + pleuritic chest pain persist, denies abdominal pain, tolerating po intake, agreeable for blood transfusion  7/25 - no cp palps. mild cough, dry. shortness of breath requiring O2 via NC   7/26 - still some SOB.  pt seen by [facility] and agreeable to PRBC transfusion.      PHYSICAL EXAM:    Vital Signs Last 24 Hrs  T(C): 37.5 (26 Jul 2023 08:28), Max: 37.5 (26 Jul 2023 08:28)  T(F): 99.5 (26 Jul 2023 08:28), Max: 99.5 (26 Jul 2023 08:28)  HR: 75 (26 Jul 2023 08:28) (61 - 77)  BP: 102/61 (26 Jul 2023 08:28) (93/57 - 106/61)  BP(mean): --  RR: 18 (26 Jul 2023 08:28) (18 - 18)  SpO2: 95% (26 Jul 2023 08:28) (92% - 99%)    Parameters below as of 26 Jul 2023 08:28  Patient On (Oxygen Delivery Method): nasal cannula  O2 Flow (L/min): 2      GENERAL: NAD, able to lie flat in bed  HEAD:  Atraumatic, Normocephalic  EYES: EOMI, PERRLA, normal sclera  ENT: Moist mucous membranes  NECK: Supple, No JVD, no nuchal rigidity  CHEST/LUNG: Clear to auscultation bilaterally; No rales, rhonchi, wheezing, or rubs. Unlabored respirations  HEART: Regular rate and rhythm; No murmurs, rubs, or gallops  ABDOMEN: Bowel sounds present; Soft, Nontender, Nondistended. No hepatomegaly  EXTREMITIES:  no pitting bilaterally  NERVOUS SYSTEM:  Alert & Oriented X3, speech clear. No focal motor or sensory deficits  MSK: FROM all 4 extremities, full and equal strength  SKIN: No rashes or lesions    RAD  < from: US Duplex Venous Lower Ext Complete, Bilateral (07.23.23 @ 17:20) >  No evidence of deep venous thrombosis in either lower extremity.  < from: CT Angio Chest PE Protocol w/ IV Cont (07.23.23 @ 15:05) >  1. New bilateral lower lobe consolidation and additional scattered   consolidative opacities, as described. Recommend plain film follow-up.  2. No evidence of pulmonary embolism.    LABS: All Labs Reviewed:                     9.4    7.32  )-----------( 486      ( 25 Jul 2023 07:56 )             27.2   135  |  100  |  7   ----------------------------<  96  3.6   |  34<H>  |  0.73    Parasitemia, Blood (collected 07-25-23 @ 07:56)  Source: .Blood None  Final Report (07-25-23 @ 14:36):    Babesia species    by Giemsa Stain    Parasitemia = < 1 %

## 2023-07-26 NOTE — PROGRESS NOTE ADULT - ASSESSMENT
52 year old female PMHx significant for ulcerative colitis presents to the [facility] for further evaluation and management of symptoms of progressively worsening generalized weakness, malaise/fatigue, myalgias, subjective fevers  Found to have Babesiosis   CTA performed 7/23 due to increased O2 requirements- Findings represent bilateral consolidations with mild pleural effusions- consolidations have increased since 7/20.   On examination, she has diminished breath sounds at the bases and a cough with exhalation still  Started Symbicort temporarily  On Atovaquone/Azithromycin ; Ceftriaxone for pneumonia   ABG reviewed - 7.51/34/61  Dopplers negative  Now she is on 2L -2.5L NC O2 that is transient without active symptoms.   Transfused 1 U PRBC on 7/24  Will continue to monitor - she is not in acute respiratory distress   Reassess for ambulatory desaturations   For 7/26, I have ordered a repeat XR, BNP and Lasix 40mg to assist with diuresis. Reviewed echocardiogram- no findings of pulmonary hypertension but increased Pulmonic valve velocity noted.

## 2023-07-26 NOTE — PROGRESS NOTE ADULT - SUBJECTIVE AND OBJECTIVE BOX
INTERVAL HISTORY:    Patient continues have a dyspnea with exertion.  Seen by pulmonary medicine this morning.  She has continued on antibiotics.  Hemoglobin is 8.2 g/dL.  She had received a transfusion on 724 and on 725 hemoglobin was 9.4.  Parasitemia is less than 1%.      Allergies    No Known Allergies    Intolerances        MEDICATIONS  (STANDING):  albuterol    0.083% 2.5 milliGRAM(s) Nebulizer every 8 hours  albuterol    90 MICROgram(s) HFA Inhaler 1 Puff(s) Inhalation every 4 hours  atovaquone  Suspension 750 milliGRAM(s) Oral two times a day  azithromycin  IVPB 500 milliGRAM(s) IV Intermittent every 24 hours  budesonide 160 MICROgram(s)/formoterol 4.5 MICROgram(s) Inhaler 2 Puff(s) Inhalation two times a day  cefTRIAXone Injectable.      cefTRIAXone Injectable. 1000 milliGRAM(s) IV Push every 24 hours  cyanocobalamin 1000 MICROGram(s) Oral daily  furosemide    Tablet 40 milliGRAM(s) Oral daily  guaiFENesin Oral Liquid (Sugar-Free) 200 milliGRAM(s) Oral every 6 hours  magnesium oxide 400 milliGRAM(s) Oral at bedtime  multivitamin 1 Tablet(s) Oral daily  saccharomyces boulardii 250 milliGRAM(s) Oral two times a day    MEDICATIONS  (PRN):  acetaminophen     Tablet .. 650 milliGRAM(s) Oral every 6 hours PRN Temp greater or equal to 38C (100.4F), Mild Pain (1 - 3)  aluminum hydroxide/magnesium hydroxide/simethicone Suspension 30 milliLiter(s) Oral every 4 hours PRN Dyspepsia  benzocaine/menthol Lozenge 1 Lozenge Oral four times a day PRN Sore Throat  clonazePAM  Tablet 0.5 milliGRAM(s) Oral at bedtime PRN Insomnia  ibuprofen  Tablet. 400 milliGRAM(s) Oral every 6 hours PRN Temp greater or equal to 38.5C (101.3F)  lidocaine   4% Patch 1 Patch Transdermal daily PRN Pain  melatonin 3 milliGRAM(s) Oral at bedtime PRN Insomnia  ondansetron Injectable 4 milliGRAM(s) IV Push every 8 hours PRN Nausea and/or Vomiting      Vital Signs Last 24 Hrs  T(C): 37.8 (26 Jul 2023 16:16), Max: 37.8 (26 Jul 2023 16:16)  T(F): 100 (26 Jul 2023 16:16), Max: 100 (26 Jul 2023 16:16)  HR: 77 (26 Jul 2023 16:16) (71 - 79)  BP: 99/66 (26 Jul 2023 16:16) (92/63 - 106/61)  BP(mean): --  RR: 18 (26 Jul 2023 16:16) (18 - 18)  SpO2: 94% (26 Jul 2023 16:16) (92% - 99%)    Parameters below as of 26 Jul 2023 16:16  Patient On (Oxygen Delivery Method): nasal cannula  O2 Flow (L/min): 2      PHYSICAL EXAM:    GENERAL: NAD, Appears comfortable  HEAD:  Atraumatic, Normocephalic  EYES: EOMI, PERRLA, conjunctiva and sclera clear    NECK: Supple, No JVD, Normal thyroid  NERVOUS SYSTEM:    CHEST/LUNG: Bases with dullness to percussion   HEART: Regular rate and rhythm;   ABDOMEN: Soft, Nontender.  EXTREMITIES:   edema:-  LYMPH: No lymphadenopathy noted    Status post blood transfusion 7/25    LABS:                        8.2    6.88  )-----------( 530      ( 26 Jul 2023 07:02 )             23.8     07-26    136  |  103  |  7   ----------------------------<  100<H>  4.0   |  29  |  0.64    Ca    8.1<L>      26 Jul 2023 07:02  Phos  3.5     07-26  Mg     2.2     07-26    TPro  7.2  /  Alb  1.9<L>  /  TBili  1.3<H>  /  DBili  x   /  AST  137<H>  /  ALT  141<H>  /  AlkPhos  116  07-25      Urinalysis Basic - ( 26 Jul 2023 07:02 )    Color: x / Appearance: x / SG: x / pH: x  Gluc: 100 mg/dL / Ketone: x  / Bili: x / Urobili: x   Blood: x / Protein: x / Nitrite: x   Leuk Esterase: x / RBC: x / WBC x   Sq Epi: x / Non Sq Epi: x / Bacteria: x  Babesia species   by Giemsa Stain   Parasitemia = < 1 % (07.25.23 @ 07:56)

## 2023-07-26 NOTE — PROGRESS NOTE ADULT - ASSESSMENT
52 year old female PMHx significant for ulcerative colitis presents to the [facility] on 7/19/23  for further evaluation and management of symptoms of progressively worsening generalized weakness, malaise/fatigue, myalgias, subjective fevers (TMax at home 103.7'F), generalized abdominal discomfort and decreased PO intake which began on Friday (7/14).  The patient states that her symptoms progressed despite taking Tylenol and Motrin prn. Of note the patient reportedly tested negative for Influenza and COVID-19. Upon triage the patient met sepsis criteria => Vital signs in triage => BP 86/53, HR 70/min, RR 27/min, SpO2 97% on room air. In the ED CCM was consulted to further evaluate.     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 Azithromycin 500mg IVPB daily needs 10 days course , day 5 in the hospital  - cont. anti-pyretics prn  - tylenol alternating with ibuprofen  - 7/24 - 1 unit of PRBC with lasix 20 after   7/25 - Hb better, no role for exchange tf at this time   7/26 - spoke with dr villa. plan for 1 unit PRBC.      Acute hypoxic respiratory failure due to   atelectasis,  and bilateral gram negative lisa Pneumonia, not noted on admission   - CT chest - mild septal thickening in both lungs - ? edema, vs infection   - CTA 7/23 - New bilateral lower lobe consolidation,  doppler LE - no DVT    - 7/22 added Ceftriaxone  - 7/23 - s/p low dose lasix 20 mg   7/25 - Cont rocephin     1.4 cm right hepatic lesion with transaminitis   - MRCP - likely hemangioma , o/p repeat test   - CT abd - noted  - hepatitis panel neg     Hypovolumic hyponatremia, Hypoalbuminemia - monitor Na    Hypokalemia, Hypophosphatemia -  replace    Hypocalcemia  -  vit D wnl     UC - add probiotics; stable, not on immunosuppressants  Anxiety ~cont. Clonazepam 0.5mg po qhs prn  ADHD ~will hold Dextroamphetamine-Amphetamine (takes 10mg po daily), takes Dextroamphetamine-Amphetamine ER 20mg daily prn  VTE PX ~cont. SCDs for now CTA - neg for PE, no DVT     DISPO  - abx, antipyretics , inpatient monitoring until cleared by ID and pulmonary and oncology  POC discussed with family as well

## 2023-07-26 NOTE — PROGRESS NOTE ADULT - ASSESSMENT
Babesiosis on antibiotics currently cultures showed less than 1%  Persistent anemia  Elevated LDH/low haptoglobin could be from transfusions, consolidation, hemolysis  Bilateral lower lobe consolidations.  As per pulmonary medicine likely pneumonia/atelectasis/no ARDS    Plan    Recommend type and cross and transfuse 1 unit today  Follow-up with serologies in the a.m.  Pulmonary support  Antibiotics       Babesiosis on antibiotics currently cultures showed less than 1%  Persistent anemia  Elevated LDH/low haptoglobin could be from transfusions, consolidation, hemolysis  Bilateral lower lobe consolidations.  As per pulmonary medicine likely pneumonia/atelectasis/no ARDS    Plan    Recommend type and cross and transfuse 1 unit today  Follow-up with serologies in the a.m.  Pulmonary support  Antibiotics      Addendum    No parasites seen   LDH elevated/ Haptoglobin < 20 Reticulocytes 2.3 %     The anemia / LDH / low haptoglobin may reflect RBC fragment entrapment in the lungs / partly accounting for the Pulmonary findings  The reticulocyte count is low, therefore sluggish response to anemia /   Plan  Transfuse  Folate 1 mg/ day  CBC and  smear in AM

## 2023-07-27 LAB
ANISOCYTOSIS BLD QL: SLIGHT — SIGNIFICANT CHANGE UP
BASOPHILS # BLD AUTO: 0 K/UL — SIGNIFICANT CHANGE UP (ref 0–0.2)
BASOPHILS NFR BLD AUTO: 0 % — SIGNIFICANT CHANGE UP (ref 0–2)
BILIRUB DIRECT SERPL-MCNC: 0.4 MG/DL — HIGH (ref 0–0.3)
BILIRUB INDIRECT FLD-MCNC: 1 MG/DL — SIGNIFICANT CHANGE UP (ref 0.2–1)
BILIRUB SERPL-MCNC: 1.4 MG/DL — HIGH (ref 0.2–1.2)
CULTURE RESULTS: SIGNIFICANT CHANGE UP
DIR ANTIGLOB POLYSPECIFIC INTERPRETATION: SIGNIFICANT CHANGE UP
EOSINOPHIL # BLD AUTO: 0 K/UL — SIGNIFICANT CHANGE UP (ref 0–0.5)
EOSINOPHIL NFR BLD AUTO: 0 % — SIGNIFICANT CHANGE UP (ref 0–6)
FOLATE SERPL-MCNC: >20 NG/ML — SIGNIFICANT CHANGE UP
GIANT PLATELETS BLD QL SMEAR: PRESENT — SIGNIFICANT CHANGE UP
HAPTOGLOB SERPL-MCNC: <20 MG/DL — LOW (ref 34–200)
HCT VFR BLD CALC: 24.7 % — LOW (ref 34.5–45)
HGB BLD-MCNC: 8.6 G/DL — LOW (ref 11.5–15.5)
HYPOCHROMIA BLD QL: SLIGHT — SIGNIFICANT CHANGE UP
LYMPHOCYTES # BLD AUTO: 1.77 K/UL — SIGNIFICANT CHANGE UP (ref 1–3.3)
LYMPHOCYTES # BLD AUTO: 24 % — SIGNIFICANT CHANGE UP (ref 13–44)
MANUAL SMEAR VERIFICATION: SIGNIFICANT CHANGE UP
MCHC RBC-ENTMCNC: 29.2 PG — SIGNIFICANT CHANGE UP (ref 27–34)
MCHC RBC-ENTMCNC: 34.8 GM/DL — SIGNIFICANT CHANGE UP (ref 32–36)
MCV RBC AUTO: 83.7 FL — SIGNIFICANT CHANGE UP (ref 80–100)
MICROCYTES BLD QL: SLIGHT — SIGNIFICANT CHANGE UP
MONOCYTES # BLD AUTO: 1.77 K/UL — HIGH (ref 0–0.9)
MONOCYTES NFR BLD AUTO: 24 % — HIGH (ref 2–14)
NEUTROPHILS # BLD AUTO: 3.84 K/UL — SIGNIFICANT CHANGE UP (ref 1.8–7.4)
NEUTROPHILS NFR BLD AUTO: 52 % — SIGNIFICANT CHANGE UP (ref 43–77)
NRBC # BLD: 0 /100 — SIGNIFICANT CHANGE UP (ref 0–0)
NRBC # BLD: SIGNIFICANT CHANGE UP /100 WBCS (ref 0–0)
PLAT MORPH BLD: ABNORMAL
PLATELET # BLD AUTO: 612 K/UL — HIGH (ref 150–400)
POIKILOCYTOSIS BLD QL AUTO: SLIGHT — SIGNIFICANT CHANGE UP
POLYCHROMASIA BLD QL SMEAR: SLIGHT — SIGNIFICANT CHANGE UP
RBC # BLD: 2.95 M/UL — LOW (ref 3.8–5.2)
RBC # BLD: 2.95 M/UL — LOW (ref 3.8–5.2)
RBC # FLD: 14.9 % — HIGH (ref 10.3–14.5)
RBC BLD AUTO: ABNORMAL
RETICS #: 88.2 K/UL — SIGNIFICANT CHANGE UP (ref 25–125)
RETICS/RBC NFR: 3 % — HIGH (ref 0.5–2.5)
SPECIMEN SOURCE: SIGNIFICANT CHANGE UP
WBC # BLD: 7.39 K/UL — SIGNIFICANT CHANGE UP (ref 3.8–10.5)
WBC # FLD AUTO: 7.39 K/UL — SIGNIFICANT CHANGE UP (ref 3.8–10.5)

## 2023-07-27 PROCEDURE — 99232 SBSQ HOSP IP/OBS MODERATE 35: CPT

## 2023-07-27 RX ADMIN — Medication 200 MILLIGRAM(S): at 11:06

## 2023-07-27 RX ADMIN — MAGNESIUM OXIDE 400 MG ORAL TABLET 400 MILLIGRAM(S): 241.3 TABLET ORAL at 20:50

## 2023-07-27 RX ADMIN — ALBUTEROL 2.5 MILLIGRAM(S): 90 AEROSOL, METERED ORAL at 20:23

## 2023-07-27 RX ADMIN — ALBUTEROL 2.5 MILLIGRAM(S): 90 AEROSOL, METERED ORAL at 09:50

## 2023-07-27 RX ADMIN — Medication 3 MILLIGRAM(S): at 20:59

## 2023-07-27 RX ADMIN — ATOVAQUONE 750 MILLIGRAM(S): 750 SUSPENSION ORAL at 20:51

## 2023-07-27 RX ADMIN — Medication 1 TABLET(S): at 10:02

## 2023-07-27 RX ADMIN — Medication 200 MILLIGRAM(S): at 05:37

## 2023-07-27 RX ADMIN — AZITHROMYCIN 255 MILLIGRAM(S): 500 TABLET, FILM COATED ORAL at 14:09

## 2023-07-27 RX ADMIN — BUDESONIDE AND FORMOTEROL FUMARATE DIHYDRATE 2 PUFF(S): 160; 4.5 AEROSOL RESPIRATORY (INHALATION) at 20:21

## 2023-07-27 RX ADMIN — CEFTRIAXONE 1000 MILLIGRAM(S): 500 INJECTION, POWDER, FOR SOLUTION INTRAMUSCULAR; INTRAVENOUS at 10:02

## 2023-07-27 RX ADMIN — Medication 200 MILLIGRAM(S): at 17:26

## 2023-07-27 RX ADMIN — Medication 250 MILLIGRAM(S): at 20:51

## 2023-07-27 RX ADMIN — Medication 250 MILLIGRAM(S): at 10:02

## 2023-07-27 RX ADMIN — Medication 650 MILLIGRAM(S): at 17:25

## 2023-07-27 RX ADMIN — Medication 650 MILLIGRAM(S): at 18:25

## 2023-07-27 RX ADMIN — BUDESONIDE AND FORMOTEROL FUMARATE DIHYDRATE 2 PUFF(S): 160; 4.5 AEROSOL RESPIRATORY (INHALATION) at 09:51

## 2023-07-27 RX ADMIN — Medication 40 MILLIGRAM(S): at 10:03

## 2023-07-27 RX ADMIN — PREGABALIN 1000 MICROGRAM(S): 225 CAPSULE ORAL at 10:02

## 2023-07-27 RX ADMIN — ATOVAQUONE 750 MILLIGRAM(S): 750 SUSPENSION ORAL at 10:03

## 2023-07-27 NOTE — PROGRESS NOTE ADULT - SUBJECTIVE AND OBJECTIVE BOX
Hematology/Oncology    Pt seen, anxious to go home, sitting upright  still requiring oxygen  cough still present  no cp    MEDICATIONS  (STANDING):  albuterol    0.083% 2.5 milliGRAM(s) Nebulizer every 8 hours  albuterol    90 MICROgram(s) HFA Inhaler 1 Puff(s) Inhalation every 4 hours  atovaquone  Suspension 750 milliGRAM(s) Oral two times a day  azithromycin  IVPB 500 milliGRAM(s) IV Intermittent every 24 hours  budesonide 160 MICROgram(s)/formoterol 4.5 MICROgram(s) Inhaler 2 Puff(s) Inhalation two times a day  cyanocobalamin 1000 MICROGram(s) Oral daily  furosemide    Tablet 40 milliGRAM(s) Oral daily  guaiFENesin Oral Liquid (Sugar-Free) 200 milliGRAM(s) Oral every 6 hours  magnesium oxide 400 milliGRAM(s) Oral at bedtime  multivitamin 1 Tablet(s) Oral daily  saccharomyces boulardii 250 milliGRAM(s) Oral two times a day    MEDICATIONS  (PRN):  acetaminophen     Tablet .. 650 milliGRAM(s) Oral every 6 hours PRN Temp greater or equal to 38C (100.4F), Mild Pain (1 - 3)  aluminum hydroxide/magnesium hydroxide/simethicone Suspension 30 milliLiter(s) Oral every 4 hours PRN Dyspepsia  benzocaine/menthol Lozenge 1 Lozenge Oral four times a day PRN Sore Throat  ibuprofen  Tablet. 400 milliGRAM(s) Oral every 6 hours PRN Temp greater or equal to 38.5C (101.3F)  lidocaine   4% Patch 1 Patch Transdermal daily PRN Pain  melatonin 3 milliGRAM(s) Oral at bedtime PRN Insomnia  ondansetron Injectable 4 milliGRAM(s) IV Push every 8 hours PRN Nausea and/or Vomiting      ROS  No fever, sweats, chills  No epistaxis, HA, sore throat  No CP, SOB, cough, sputum  No n/v/d, abd pain, melena, hematochezia  No edema  No rash  No anxiety  No back pain, joint pain  No bleeding, bruising  No dysuria, hematuria    Vital Signs Last 24 Hrs  T(C): 37 (27 Jul 2023 15:57), Max: 37.6 (26 Jul 2023 21:15)  T(F): 98.6 (27 Jul 2023 15:57), Max: 99.7 (26 Jul 2023 21:15)  HR: 77 (27 Jul 2023 15:57) (72 - 77)  BP: 91/52 (27 Jul 2023 15:57) (91/51 - 108/66)  BP(mean): 69 (26 Jul 2023 21:15) (69 - 69)  RR: 18 (27 Jul 2023 15:57) (18 - 18)  SpO2: 94% (27 Jul 2023 15:57) (94% - 98%)    Parameters below as of 27 Jul 2023 15:57  Patient On (Oxygen Delivery Method): nasal cannula  O2 Flow (L/min): 2      PE  NAD  Awake, alert  Anicteric, MMM  RRR  CTAB  Abd soft, NT, ND  No c/c/e  No rash grossly  FROM                          8.6    7.39  )-----------( 612      ( 27 Jul 2023 07:09 )             24.7       07-26    136  |  103  |  7   ----------------------------<  100<H>  4.0   |  29  |  0.64    Ca    8.1<L>      26 Jul 2023 07:02  Phos  3.5     07-26  Mg     2.2     07-26    TPro  x   /  Alb  x   /  TBili  1.4<H>  /  DBili  0.4<H>  /  AST  x   /  ALT  x   /  AlkPhos  x   07-27

## 2023-07-27 NOTE — PROGRESS NOTE ADULT - ASSESSMENT
52 year old female PMHx significant for ulcerative colitis presents to the [facility] for further evaluation and management of symptoms of progressively worsening generalized weakness, malaise/fatigue, myalgias, subjective fevers  Found to have Babesiosis   CTA performed 7/23 due to increased O2 requirements- Findings represent bilateral consolidations with mild pleural effusions- consolidations have increased since 7/20.   On examination, she has diminished breath sounds at the bases and a cough with exhalation still  Started Symbicort temporarily  On Atovaquone/Azithromycin ; Ceftriaxone for pneumonia   ABG reviewed - 7.51/34/61  Dopplers negative  Now she is on 2L -2.5L NC O2 that is transient without active symptoms.   Transfused 1 U PRBC on 7/24 and 7/27  Will continue to monitor - she is not in acute respiratory distress   Reassess for ambulatory desaturations   Reviewed repeat XR, BNP normal and recommend to continue Lasix 40mg to assist with diuresis.   Reviewed echocardiogram- no findings of pulmonary hypertension but increased Pulmonic valve velocity noted.  Follow up H/H  Reviewed hemolysis panel

## 2023-07-27 NOTE — PROGRESS NOTE ADULT - ASSESSMENT
52 year old female PMHx significant for ulcerative colitis presents to the [facility] on 7/19/23  for further evaluation and management of symptoms of progressively worsening generalized weakness, malaise/fatigue, myalgias, subjective fevers (TMax at home 103.7'F), generalized abdominal discomfort and decreased PO intake which began on Friday (7/14).  The patient states that her symptoms progressed despite taking Tylenol and Motrin prn. Of note the patient reportedly tested negative for Influenza and COVID-19. Upon triage the patient met sepsis criteria => Vital signs in triage => BP 86/53, HR 70/min, RR 27/min, SpO2 97% on room air. In the ED CCM was consulted to further evaluate.     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 Azithromycin 500mg IVPB daily needs 10 days course , day 5 in the hospital  - cont. anti-pyretics prn  - tylenol alternating with ibuprofen  - 7/24 - 1 unit of PRBC with lasix 20 after   7/25 - Hb better, no role for exchange tf at this time   7/26 - spoke with dr villa. plan for 1 unit PRBC.    7/27 - spoke with dr villa and ray and plan for exchange transfusion but pt is refusing.  monitor hgb and check in AM     Acute hypoxic respiratory failure due to   atelectasis,  and bilateral gram negative lisa Pneumonia, not noted on admission   - CT chest - mild septal thickening in both lungs - ? edema, vs infection vs intracapillary hemolysis casusing this and this d/w pt and dr villa/ray and need for exahnge transfusion   - CTA 7/23 - New bilateral lower lobe consolidation,  doppler LE - no DVT    - 7/22 added Ceftriaxone  - 7/23 - s/p low dose lasix 20 mg   7/25 - Cont rocephin   - pt on atovaquone, ceftriaxone and zithromax    1.4 cm right hepatic lesion with transaminitis   - MRCP - likely hemangioma , o/p repeat test   - CT abd - noted  - hepatitis panel neg     Hypovolumic hyponatremia, Hypoalbuminemia - monitor Na    Hypokalemia, Hypophosphatemia -  replace    Hypocalcemia  -  vit D wnl     UC - add probiotics; stable, not on immunosuppressants  Anxiety ~cont. Clonazepam 0.5mg po qhs prn  ADHD ~will hold Dextroamphetamine-Amphetamine (takes 10mg po daily), takes Dextroamphetamine-Amphetamine ER 20mg daily prn  VTE PX ~cont. SCDs for now CTA - neg for PE, no DVT     DISPO  - abx, antipyretics , inpatient monitoring until cleared by ID and pulmonary and oncology  POC discussed with family as well

## 2023-07-27 NOTE — PROGRESS NOTE ADULT - SUBJECTIVE AND OBJECTIVE BOX
Patient is a 52y old  Female who presents with a chief complaint of Generalized Malaise, Fatigue, Myalgias, Fever, Abdominal Pain, Nausea, decreased PO intake. (22 Jul 2023 14:46)      HPI:  52 year old female PMHx significant for ulcerative colitis presents to the [facility] for further evaluation and management of symptoms of progressively worsening generalized weakness, malaise/fatigue, myalgias, subjective fevers (TMax at home 103.7'F), generalized abdominal discomfort and decreased PO intake which began on Friday (7/14).  The patient states that her symptoms progressed despite taking Tylenol and Motrin prn. Of note the patient reportedly tested negative for Influenza and COVID-19. Upon triage the patient met sepsis criteria => Vital signs in triage => BP 86/53, HR 70/min, RR 27/min, SpO2 97% on room air. In the ED CCM was consulted to further evaluate.   Found to have Babesiosis   CT Chest revealed findings concerning of mild pulmonary edema (likely non cardiogenic)/parasite related     7/27  Dyspnea improving after lasix  XR shows improvement  refused 1 u PRBC  on 2 L NC O 2    MEDICATIONS  (STANDING):  albuterol    0.083% 2.5 milliGRAM(s) Nebulizer every 8 hours  albuterol    90 MICROgram(s) HFA Inhaler 1 Puff(s) Inhalation every 4 hours  atovaquone  Suspension 750 milliGRAM(s) Oral two times a day  azithromycin  IVPB 500 milliGRAM(s) IV Intermittent every 24 hours  budesonide 160 MICROgram(s)/formoterol 4.5 MICROgram(s) Inhaler 2 Puff(s) Inhalation two times a day  cefTRIAXone Injectable.      cefTRIAXone Injectable. 1000 milliGRAM(s) IV Push every 24 hours  cyanocobalamin 1000 MICROGram(s) Oral daily  guaiFENesin Oral Liquid (Sugar-Free) 200 milliGRAM(s) Oral every 6 hours  magnesium oxide 400 milliGRAM(s) Oral at bedtime  multivitamin 1 Tablet(s) Oral daily  potassium phosphate / sodium phosphate Tablet (K-PHOS No. 2) 1 Tablet(s) Oral three times a day  saccharomyces boulardii 250 milliGRAM(s) Oral two times a day    MEDICATIONS  (PRN):  acetaminophen     Tablet .. 650 milliGRAM(s) Oral every 6 hours PRN Temp greater or equal to 38C (100.4F), Mild Pain (1 - 3)  aluminum hydroxide/magnesium hydroxide/simethicone Suspension 30 milliLiter(s) Oral every 4 hours PRN Dyspepsia  benzocaine/menthol Lozenge 1 Lozenge Oral four times a day PRN Sore Throat  clonazePAM  Tablet 0.5 milliGRAM(s) Oral at bedtime PRN Insomnia  ibuprofen  Tablet. 400 milliGRAM(s) Oral every 6 hours PRN Temp greater or equal to 38.5C (101.3F)  melatonin 3 milliGRAM(s) Oral at bedtime PRN Insomnia  ondansetron Injectable 4 milliGRAM(s) IV Push every 8 hours PRN Nausea and/or Vomiting      Vital Signs Last 24 Hrs  T(C): 37.6 (26 Jul 2023 21:15), Max: 37.8 (26 Jul 2023 16:16)  T(F): 99.7 (26 Jul 2023 21:15), Max: 100 (26 Jul 2023 16:16)  HR: 76 (26 Jul 2023 21:15) (75 - 79)  BP: 107/53 (26 Jul 2023 21:15) (92/63 - 107/53)  BP(mean): 69 (26 Jul 2023 21:15) (69 - 69)  RR: 18 (26 Jul 2023 21:15) (18 - 18)  SpO2: 95% (26 Jul 2023 21:15) (94% - 98%)    Parameters below as of 26 Jul 2023 21:15  Patient On (Oxygen Delivery Method): nasal cannula  O2 Flow (L/min): 2        I&O's Summary    PHYSICAL EXAM  General Appearance: cooperative, no acute distress,   HEENT: PERRL, conjunctiva clear, EOM's intact, non injected pharynx, no exudate, TM   normal  Neck: Supple, , no adenopathy, thyroid: not enlarged, no carotid bruit or JVD  Back: Symmetric, no  tenderness,no soft tissue tenderness  Lungs: breath sounds have improved, chest expansion is less limited, no longer has cough with exhalation  Heart: Regular rate and rhythm, S1, S2 normal, no murmur, rub or gallop  Abdomen: Soft, non-tender, bowel sounds active , no hepatosplenomegaly  Extremities: no cyanosis or edema, no joint swelling  Skin: Skin color, texture normal, no rashes   Neurologic: Alert and oriented X3 , cranial nerves intact, sensory and motor normal,    ECG:    LABS:                                     8.2    6.88  )-----------( 530      ( 26 Jul 2023 07:02 )             23.8   07-26    136  |  103  |  7   ----------------------------<  100<H>  4.0   |  29  |  0.64    Ca    8.1<L>      26 Jul 2023 07:02  Phos  3.5     07-26  Mg     2.2     07-26          RADIOLOGY & ADDITIONAL STUDIES:     Patient is a 52y old  Female who presents with a chief complaint of Generalized Malaise, Fatigue, Myalgias, Fever, Abdominal Pain, Nausea, decreased PO intake. (22 Jul 2023 14:46)      HPI:  52 year old female PMHx significant for ulcerative colitis presents to the [facility] for further evaluation and management of symptoms of progressively worsening generalized weakness, malaise/fatigue, myalgias, subjective fevers (TMax at home 103.7'F), generalized abdominal discomfort and decreased PO intake which began on Friday (7/14).  The patient states that her symptoms progressed despite taking Tylenol and Motrin prn. Of note the patient reportedly tested negative for Influenza and COVID-19. Upon triage the patient met sepsis criteria => Vital signs in triage => BP 86/53, HR 70/min, RR 27/min, SpO2 97% on room air. In the ED CCM was consulted to further evaluate.   Found to have Babesiosis   CT Chest revealed findings concerning of mild pulmonary edema (likely non cardiogenic)/parasite related     7/27  Dyspnea improving after lasix  XR shows improvement  Transfused 1 u PRBC  on 2 L NC O 2    MEDICATIONS  (STANDING):  albuterol    0.083% 2.5 milliGRAM(s) Nebulizer every 8 hours  albuterol    90 MICROgram(s) HFA Inhaler 1 Puff(s) Inhalation every 4 hours  atovaquone  Suspension 750 milliGRAM(s) Oral two times a day  azithromycin  IVPB 500 milliGRAM(s) IV Intermittent every 24 hours  budesonide 160 MICROgram(s)/formoterol 4.5 MICROgram(s) Inhaler 2 Puff(s) Inhalation two times a day  cefTRIAXone Injectable.      cefTRIAXone Injectable. 1000 milliGRAM(s) IV Push every 24 hours  cyanocobalamin 1000 MICROGram(s) Oral daily  guaiFENesin Oral Liquid (Sugar-Free) 200 milliGRAM(s) Oral every 6 hours  magnesium oxide 400 milliGRAM(s) Oral at bedtime  multivitamin 1 Tablet(s) Oral daily  potassium phosphate / sodium phosphate Tablet (K-PHOS No. 2) 1 Tablet(s) Oral three times a day  saccharomyces boulardii 250 milliGRAM(s) Oral two times a day    MEDICATIONS  (PRN):  acetaminophen     Tablet .. 650 milliGRAM(s) Oral every 6 hours PRN Temp greater or equal to 38C (100.4F), Mild Pain (1 - 3)  aluminum hydroxide/magnesium hydroxide/simethicone Suspension 30 milliLiter(s) Oral every 4 hours PRN Dyspepsia  benzocaine/menthol Lozenge 1 Lozenge Oral four times a day PRN Sore Throat  clonazePAM  Tablet 0.5 milliGRAM(s) Oral at bedtime PRN Insomnia  ibuprofen  Tablet. 400 milliGRAM(s) Oral every 6 hours PRN Temp greater or equal to 38.5C (101.3F)  melatonin 3 milliGRAM(s) Oral at bedtime PRN Insomnia  ondansetron Injectable 4 milliGRAM(s) IV Push every 8 hours PRN Nausea and/or Vomiting      Vital Signs Last 24 Hrs  T(C): 37.6 (26 Jul 2023 21:15), Max: 37.8 (26 Jul 2023 16:16)  T(F): 99.7 (26 Jul 2023 21:15), Max: 100 (26 Jul 2023 16:16)  HR: 76 (26 Jul 2023 21:15) (75 - 79)  BP: 107/53 (26 Jul 2023 21:15) (92/63 - 107/53)  BP(mean): 69 (26 Jul 2023 21:15) (69 - 69)  RR: 18 (26 Jul 2023 21:15) (18 - 18)  SpO2: 95% (26 Jul 2023 21:15) (94% - 98%)    Parameters below as of 26 Jul 2023 21:15  Patient On (Oxygen Delivery Method): nasal cannula  O2 Flow (L/min): 2        I&O's Summary    PHYSICAL EXAM  General Appearance: cooperative, no acute distress,   HEENT: PERRL, conjunctiva clear, EOM's intact, non injected pharynx, no exudate, TM   normal  Neck: Supple, , no adenopathy, thyroid: not enlarged, no carotid bruit or JVD  Back: Symmetric, no  tenderness,no soft tissue tenderness  Lungs: breath sounds have improved, chest expansion is less limited, no longer has cough with exhalation  Heart: Regular rate and rhythm, S1, S2 normal, no murmur, rub or gallop  Abdomen: Soft, non-tender, bowel sounds active , no hepatosplenomegaly  Extremities: no cyanosis or edema, no joint swelling  Skin: Skin color, texture normal, no rashes   Neurologic: Alert and oriented X3 , cranial nerves intact, sensory and motor normal,    ECG:    LABS:                                     8.2    6.88  )-----------( 530      ( 26 Jul 2023 07:02 )             23.8   07-26    136  |  103  |  7   ----------------------------<  100<H>  4.0   |  29  |  0.64    Ca    8.1<L>      26 Jul 2023 07:02  Phos  3.5     07-26  Mg     2.2     07-26          RADIOLOGY & ADDITIONAL STUDIES:

## 2023-07-27 NOTE — PROGRESS NOTE ADULT - ASSESSMENT
Babesiosis on antibiotics currently cultures showed less than 1%  Persistent anemia despite PRBC transfusion  Elevated LDH/low haptoglobin could be from transfusions, consolidation, hemolysis- coomb's negative  Bilateral lower lobe consolidations.  As per pulmonary medicine likely pneumonia/atelectasis/no ARDS    Plan    Reviewed peripheral smear today with no visualization of parasites  Given no findings on smear will hold off on exchange, pt also wishes to go home asap  will monitor hemolytic parameters and hgb in am, if continues to hemolyze and hgb drops then will discuss exchange further, or pt will have to leave AMA  continued pulmonary support  abx regimen as outlined by last ID note    Will cont to follow closely with you    D/w ID, pulm, hospitalist team    Thank you for the courtesy of this consultation and we will continue to follow.    Kenrick Breaux MD  [facility]  Cell: [phone]

## 2023-07-27 NOTE — PROGRESS NOTE ADULT - SUBJECTIVE AND OBJECTIVE BOX
52 year old female PMHx significant for ulcerative colitis presents to the [facility] on 7/19/23  for further evaluation and management of symptoms of progressively worsening generalized weakness, malaise/fatigue, myalgias, subjective fevers (TMax at home 103.7'F), generalized abdominal discomfort and decreased PO intake which began on Friday (7/14).  The patient states that her symptoms progressed despite taking Tylenol and Motrin prn. Of note the patient reportedly tested negative for Influenza and COVID-19. Upon triage the patient met sepsis criteria => Vital signs in triage => BP 86/53, HR 70/min, RR 27/min, SpO2 97% on room air. In the ED CCM was consulted to further evaluate.     7/20 - Patient seen and examined at bedside earlier today, + generalized chills, aches, poor po intake, generalized abdominal discomfort   7/21 - pt seen and examined in am, + febrile, + muscle aches, denies cp, + cough persist, denies cp, abdominal pain , + lose bm  7/22 - no events, still febrile, + cough, dyspnea overnight , denies cp, abdominal pain, eager to go home  7/23 - + hypoxia overnight on 4 L now, + cough, + dyspnea, + pleuritic chest pain, denies abdominal pain, plan discussed  7/24 - events noted episodes of hypoxemia, + cough, + pleuritic chest pain persist, denies abdominal pain, tolerating po intake, agreeable for blood transfusion  7/25 - no cp palps. mild cough, dry. shortness of breath requiring O2 via NC   7/26 - still some SOB.  pt seen by ana and agreeable to PRBC transfusion.    7/27 - pt tolerated transfusion yesterday . pt eager to go home but explained to her regarding need for bloodwork tomorrow and to speak with dr bell regarding exchange transfusion.      PHYSICAL EXAM:    Vital Signs Last 24 Hrs  T(C): 36.7 (27 Jul 2023 12:06), Max: 37.8 (26 Jul 2023 16:16)  T(F): 98 (27 Jul 2023 12:06), Max: 100 (26 Jul 2023 16:16)  HR: 73 (27 Jul 2023 12:06) (72 - 79)  BP: 91/51 (27 Jul 2023 12:06) (91/51 - 108/66)  BP(mean): 69 (26 Jul 2023 21:15) (69 - 69)  RR: 18 (27 Jul 2023 12:06) (18 - 18)  SpO2: 95% (27 Jul 2023 12:06) (94% - 98%)    Parameters below as of 27 Jul 2023 12:06  Patient On (Oxygen Delivery Method): nasal cannula  O2 Flow (L/min): 2      GENERAL: NAD, able to lie flat in bed  HEAD:  Atraumatic, Normocephalic  EYES: EOMI, PERRLA, normal sclera  ENT: Moist mucous membranes  NECK: Supple, No JVD, no nuchal rigidity  CHEST/LUNG: Clear to auscultation bilaterally; No rales, rhonchi, wheezing, or rubs. Unlabored respirations  HEART: Regular rate and rhythm; No murmurs, rubs, or gallops  ABDOMEN: Bowel sounds present; Soft, Nontender, Nondistended. No hepatomegaly  EXTREMITIES:  no pitting bilaterally  NERVOUS SYSTEM:  Alert & Oriented X3, speech clear. No focal motor or sensory deficits  MSK: FROM all 4 extremities, full and equal strength  SKIN: No rashes or lesions    RAD  < from: US Duplex Venous Lower Ext Complete, Bilateral (07.23.23 @ 17:20) >  No evidence of deep venous thrombosis in either lower extremity.  < from: CT Angio Chest PE Protocol w/ IV Cont (07.23.23 @ 15:05) >  1. New bilateral lower lobe consolidation and additional scattered   consolidative opacities, as described. Recommend plain film follow-up.  2. No evidence of pulmonary embolism.    LABS: All Labs Reviewed:                                            8.6    7.39  )-----------( 612      ( 27 Jul 2023 07:09 )             24.7     07-26    136  |  103  |  7   ----------------------------<  100<H>  4.0   |  29  |  0.64    Ca    8.1<L>      26 Jul 2023 07:02  Phos  3.5     07-26  Mg     2.2     07-26    TPro  x   /  Alb  x   /  TBili  1.4<H>  /  DBili  0.4<H>  /  AST  x   /  ALT  x   /  AlkPhos  x   07-27      Urinalysis Basic - ( 26 Jul 2023 07:02 )    Color: x / Appearance: x / SG: x / pH: x  Gluc: 100 mg/dL / Ketone: x  / Bili: x / Urobili: x   Blood: x / Protein: x / Nitrite: x   Leuk Esterase: x / RBC: x / WBC x   Sq Epi: x / Non Sq Epi: x / Bacteria: x

## 2023-07-28 LAB
ADD ON TEST-SPECIMEN IN LAB: SIGNIFICANT CHANGE UP
ALBUMIN SERPL ELPH-MCNC: 2.1 G/DL — LOW (ref 3.3–5)
ALP SERPL-CCNC: 94 U/L — SIGNIFICANT CHANGE UP (ref 40–120)
ALT FLD-CCNC: 139 U/L — HIGH (ref 12–78)
ANION GAP SERPL CALC-SCNC: 3 MMOL/L — LOW (ref 5–17)
APPEARANCE UR: CLEAR — SIGNIFICANT CHANGE UP
AST SERPL-CCNC: 180 U/L — HIGH (ref 15–37)
BACTERIA # UR AUTO: ABNORMAL
BILIRUB DIRECT SERPL-MCNC: 0.5 MG/DL — HIGH (ref 0–0.3)
BILIRUB INDIRECT FLD-MCNC: 1 MG/DL — SIGNIFICANT CHANGE UP (ref 0.2–1)
BILIRUB SERPL-MCNC: 1.5 MG/DL — HIGH (ref 0.2–1.2)
BILIRUB SERPL-MCNC: 1.8 MG/DL — HIGH (ref 0.2–1.2)
BILIRUB UR-MCNC: NEGATIVE — SIGNIFICANT CHANGE UP
BLD GP AB SCN SERPL QL: SIGNIFICANT CHANGE UP
BUN SERPL-MCNC: 16 MG/DL — SIGNIFICANT CHANGE UP (ref 7–23)
CALCIUM SERPL-MCNC: 8.2 MG/DL — LOW (ref 8.5–10.1)
CHLORIDE SERPL-SCNC: 102 MMOL/L — SIGNIFICANT CHANGE UP (ref 96–108)
CO2 SERPL-SCNC: 30 MMOL/L — SIGNIFICANT CHANGE UP (ref 22–31)
COLOR SPEC: YELLOW — SIGNIFICANT CHANGE UP
CREAT SERPL-MCNC: 0.75 MG/DL — SIGNIFICANT CHANGE UP (ref 0.5–1.3)
DIFF PNL FLD: ABNORMAL
EGFR: 96 ML/MIN/1.73M2 — SIGNIFICANT CHANGE UP
EPI CELLS # UR: ABNORMAL
FERRITIN SERPL-MCNC: 4515 NG/ML — HIGH (ref 13–330)
GLUCOSE SERPL-MCNC: 119 MG/DL — HIGH (ref 70–99)
GLUCOSE UR QL: NEGATIVE — SIGNIFICANT CHANGE UP
HAPTOGLOB SERPL-MCNC: <20 MG/DL — LOW (ref 34–200)
HCT VFR BLD CALC: 25.6 % — LOW (ref 34.5–45)
HGB BLD-MCNC: 8.8 G/DL — LOW (ref 11.5–15.5)
KETONES UR-MCNC: NEGATIVE — SIGNIFICANT CHANGE UP
LDH SERPL L TO P-CCNC: 1507 U/L — HIGH (ref 84–241)
LEUKOCYTE ESTERASE UR-ACNC: ABNORMAL
MCHC RBC-ENTMCNC: 29.7 PG — SIGNIFICANT CHANGE UP (ref 27–34)
MCHC RBC-ENTMCNC: 34.4 GM/DL — SIGNIFICANT CHANGE UP (ref 32–36)
MCV RBC AUTO: 86.5 FL — SIGNIFICANT CHANGE UP (ref 80–100)
NITRITE UR-MCNC: NEGATIVE — SIGNIFICANT CHANGE UP
PH UR: 8 — SIGNIFICANT CHANGE UP (ref 5–8)
PLATELET # BLD AUTO: 662 K/UL — HIGH (ref 150–400)
POTASSIUM SERPL-MCNC: 4.2 MMOL/L — SIGNIFICANT CHANGE UP (ref 3.5–5.3)
POTASSIUM SERPL-SCNC: 4.2 MMOL/L — SIGNIFICANT CHANGE UP (ref 3.5–5.3)
PROT SERPL-MCNC: 7.3 GM/DL — SIGNIFICANT CHANGE UP (ref 6–8.3)
PROT UR-MCNC: 30 MG/DL
RBC # BLD: 2.95 M/UL — LOW (ref 3.8–5.2)
RBC # BLD: 2.96 M/UL — LOW (ref 3.8–5.2)
RBC # FLD: 15.8 % — HIGH (ref 10.3–14.5)
RBC CASTS # UR COMP ASSIST: SIGNIFICANT CHANGE UP /HPF (ref 0–4)
RETICS #: 121.5 K/UL — SIGNIFICANT CHANGE UP (ref 25–125)
RETICS/RBC NFR: 4.1 % — HIGH (ref 0.5–2.5)
SODIUM SERPL-SCNC: 135 MMOL/L — SIGNIFICANT CHANGE UP (ref 135–145)
SP GR SPEC: 1.01 — SIGNIFICANT CHANGE UP (ref 1.01–1.02)
UROBILINOGEN FLD QL: 1
WBC # BLD: 10.13 K/UL — SIGNIFICANT CHANGE UP (ref 3.8–10.5)
WBC # FLD AUTO: 10.13 K/UL — SIGNIFICANT CHANGE UP (ref 3.8–10.5)
WBC UR QL: SIGNIFICANT CHANGE UP /HPF (ref 0–5)

## 2023-07-28 PROCEDURE — 99233 SBSQ HOSP IP/OBS HIGH 50: CPT

## 2023-07-28 RX ORDER — SODIUM CHLORIDE 9 MG/ML
1000 INJECTION INTRAMUSCULAR; INTRAVENOUS; SUBCUTANEOUS
Refills: 0 | Status: DISCONTINUED | OUTPATIENT
Start: 2023-07-28 | End: 2023-07-31

## 2023-07-28 RX ORDER — POLYETHYLENE GLYCOL 3350 17 G/17G
17 POWDER, FOR SOLUTION ORAL
Refills: 0 | Status: DISCONTINUED | OUTPATIENT
Start: 2023-07-28 | End: 2023-08-01

## 2023-07-28 RX ADMIN — BUDESONIDE AND FORMOTEROL FUMARATE DIHYDRATE 2 PUFF(S): 160; 4.5 AEROSOL RESPIRATORY (INHALATION) at 20:33

## 2023-07-28 RX ADMIN — Medication 250 MILLIGRAM(S): at 22:51

## 2023-07-28 RX ADMIN — Medication 650 MILLIGRAM(S): at 11:26

## 2023-07-28 RX ADMIN — SODIUM CHLORIDE 100 MILLILITER(S): 9 INJECTION INTRAMUSCULAR; INTRAVENOUS; SUBCUTANEOUS at 15:39

## 2023-07-28 RX ADMIN — MAGNESIUM OXIDE 400 MG ORAL TABLET 400 MILLIGRAM(S): 241.3 TABLET ORAL at 22:51

## 2023-07-28 RX ADMIN — PREGABALIN 1000 MICROGRAM(S): 225 CAPSULE ORAL at 09:42

## 2023-07-28 RX ADMIN — Medication 60 MILLIGRAM(S): at 23:02

## 2023-07-28 RX ADMIN — Medication 1 TABLET(S): at 09:41

## 2023-07-28 RX ADMIN — Medication 200 MILLIGRAM(S): at 03:10

## 2023-07-28 RX ADMIN — ATOVAQUONE 750 MILLIGRAM(S): 750 SUSPENSION ORAL at 11:25

## 2023-07-28 RX ADMIN — Medication 40 MILLIGRAM(S): at 09:41

## 2023-07-28 RX ADMIN — POLYETHYLENE GLYCOL 3350 17 GRAM(S): 17 POWDER, FOR SOLUTION ORAL at 23:02

## 2023-07-28 RX ADMIN — Medication 60 MILLIGRAM(S): at 14:25

## 2023-07-28 RX ADMIN — Medication 250 MILLIGRAM(S): at 09:41

## 2023-07-28 RX ADMIN — POLYETHYLENE GLYCOL 3350 17 GRAM(S): 17 POWDER, FOR SOLUTION ORAL at 11:25

## 2023-07-28 RX ADMIN — ALBUTEROL 2.5 MILLIGRAM(S): 90 AEROSOL, METERED ORAL at 09:21

## 2023-07-28 RX ADMIN — AZITHROMYCIN 255 MILLIGRAM(S): 500 TABLET, FILM COATED ORAL at 13:51

## 2023-07-28 RX ADMIN — BUDESONIDE AND FORMOTEROL FUMARATE DIHYDRATE 2 PUFF(S): 160; 4.5 AEROSOL RESPIRATORY (INHALATION) at 09:24

## 2023-07-28 RX ADMIN — Medication 3 MILLIGRAM(S): at 22:52

## 2023-07-28 RX ADMIN — SODIUM CHLORIDE 100 MILLILITER(S): 9 INJECTION INTRAMUSCULAR; INTRAVENOUS; SUBCUTANEOUS at 22:50

## 2023-07-28 RX ADMIN — ATOVAQUONE 750 MILLIGRAM(S): 750 SUSPENSION ORAL at 22:52

## 2023-07-28 NOTE — PROGRESS NOTE ADULT - SUBJECTIVE AND OBJECTIVE BOX
Patient is a 52y old  Female who presents with a chief complaint of Generalized Malaise, Fatigue, Myalgias, Fever, Abdominal Pain, Nausea, decreased PO intake. (22 Jul 2023 14:46)      HPI:  52 year old female PMHx significant for ulcerative colitis presents to the [facility] for further evaluation and management of symptoms of progressively worsening generalized weakness, malaise/fatigue, myalgias, subjective fevers (TMax at home 103.7'F), generalized abdominal discomfort and decreased PO intake which began on Friday (7/14).  The patient states that her symptoms progressed despite taking Tylenol and Motrin prn. Of note the patient reportedly tested negative for Influenza and COVID-19. Upon triage the patient met sepsis criteria => Vital signs in triage => BP 86/53, HR 70/min, RR 27/min, SpO2 97% on room air. In the ED CCM was consulted to further evaluate.   Found to have Babesiosis   CT Chest revealed findings concerning of mild pulmonary edema (likely non cardiogenic)/parasite related     7/28  Dyspnea improving after lasix  XR shows improvement  In process of undergoing work up possibly for HLH    MEDICATIONS  (STANDING):  albuterol    0.083% 2.5 milliGRAM(s) Nebulizer every 8 hours  albuterol    90 MICROgram(s) HFA Inhaler 1 Puff(s) Inhalation every 4 hours  atovaquone  Suspension 750 milliGRAM(s) Oral two times a day  azithromycin  IVPB 500 milliGRAM(s) IV Intermittent every 24 hours  budesonide 160 MICROgram(s)/formoterol 4.5 MICROgram(s) Inhaler 2 Puff(s) Inhalation two times a day  cyanocobalamin 1000 MICROGram(s) Oral daily  furosemide    Tablet 40 milliGRAM(s) Oral daily  magnesium oxide 400 milliGRAM(s) Oral at bedtime  methylPREDNISolone sodium succinate Injectable 60 milliGRAM(s) IV Push every 8 hours  multivitamin 1 Tablet(s) Oral daily  polyethylene glycol 3350 17 Gram(s) Oral two times a day  saccharomyces boulardii 250 milliGRAM(s) Oral two times a day  sodium chloride 0.9%. 1000 milliLiter(s) (100 mL/Hr) IV Continuous <Continuous>    MEDICATIONS  (PRN):  acetaminophen     Tablet .. 650 milliGRAM(s) Oral every 6 hours PRN Temp greater or equal to 38C (100.4F), Mild Pain (1 - 3)  aluminum hydroxide/magnesium hydroxide/simethicone Suspension 30 milliLiter(s) Oral every 4 hours PRN Dyspepsia  benzocaine/menthol Lozenge 1 Lozenge Oral four times a day PRN Sore Throat  ibuprofen  Tablet. 400 milliGRAM(s) Oral every 6 hours PRN Temp greater or equal to 38.5C (101.3F)  lidocaine   4% Patch 1 Patch Transdermal daily PRN Pain  melatonin 3 milliGRAM(s) Oral at bedtime PRN Insomnia  ondansetron Injectable 4 milliGRAM(s) IV Push every 8 hours PRN Nausea and/or Vomiting    Vital Signs Last 24 Hrs  T(C): 37.6 (26 Jul 2023 21:15), Max: 37.8 (26 Jul 2023 16:16)  T(F): 99.7 (26 Jul 2023 21:15), Max: 100 (26 Jul 2023 16:16)  HR: 76 (26 Jul 2023 21:15) (75 - 79)  BP: 107/53 (26 Jul 2023 21:15) (92/63 - 107/53)  BP(mean): 69 (26 Jul 2023 21:15) (69 - 69)  RR: 18 (26 Jul 2023 21:15) (18 - 18)  SpO2: 95% (26 Jul 2023 21:15) (94% - 98%)    Parameters below as of 26 Jul 2023 21:15  Patient On (Oxygen Delivery Method): nasal cannula  O2 Flow (L/min): 2        I&O's Summary    PHYSICAL EXAM  General Appearance: cooperative, no acute distress,   HEENT: PERRL, conjunctiva clear, EOM's intact, non injected pharynx, no exudate, TM   normal  Neck: Supple, , no adenopathy, thyroid: not enlarged, no carotid bruit or JVD  Back: Symmetric, no  tenderness,no soft tissue tenderness  Lungs: breath sounds have improved, chest expansion is less limited, no longer has cough with exhalation  Heart: Regular rate and rhythm, S1, S2 normal, no murmur, rub or gallop  Abdomen: Soft, non-tender, bowel sounds active , no hepatosplenomegaly  Extremities: no cyanosis or edema, no joint swelling  Skin: Skin color, texture normal, no rashes   Neurologic: Alert and oriented X3 , cranial nerves intact, sensory and motor normal,    ECG:    LABS:                                     8.2    6.88  )-----------( 530      ( 26 Jul 2023 07:02 )             23.8   07-26    136  |  103  |  7   ----------------------------<  100<H>  4.0   |  29  |  0.64    Ca    8.1<L>      26 Jul 2023 07:02  Phos  3.5     07-26  Mg     2.2     07-26          RADIOLOGY & ADDITIONAL STUDIES:

## 2023-07-28 NOTE — PROGRESS NOTE ADULT - SUBJECTIVE AND OBJECTIVE BOX
52 year old female PMHx significant for ulcerative colitis presents to the [facility] on 7/19/23  for further evaluation and management of symptoms of progressively worsening generalized weakness, malaise/fatigue, myalgias, subjective fevers (TMax at home 103.7'F), generalized abdominal discomfort and decreased PO intake which began on Friday (7/14).  The patient states that her symptoms progressed despite taking Tylenol and Motrin prn. Of note the patient reportedly tested negative for Influenza and COVID-19. Upon triage the patient met sepsis criteria => Vital signs in triage => BP 86/53, HR 70/min, RR 27/min, SpO2 97% on room air. In the ED CCM was consulted to further evaluate.     7/20 - Patient seen and examined at bedside earlier today, + generalized chills, aches, poor po intake, generalized abdominal discomfort   7/21 - pt seen and examined in am, + febrile, + muscle aches, denies cp, + cough persist, denies cp, abdominal pain , + lose bm  7/22 - no events, still febrile, + cough, dyspnea overnight , denies cp, abdominal pain, eager to go home  7/23 - + hypoxia overnight on 4 L now, + cough, + dyspnea, + pleuritic chest pain, denies abdominal pain, plan discussed  7/24 - events noted episodes of hypoxemia, + cough, + pleuritic chest pain persist, denies abdominal pain, tolerating po intake, agreeable for blood transfusion  7/25 - no cp palps. mild cough, dry. shortness of breath requiring O2 via NC   7/26 - still some SOB.  pt seen by ana and agreeable to PRBC transfusion.    7/27 - pt tolerated transfusion yesterday . pt eager to go home but explained to her regarding need for bloodwork tomorrow and to speak with dr bell regarding exchange transfusion.   7/28 - no overnight events. pt still wtih some SOB. no cp. no fever     PHYSICAL EXAM:    Vital Signs Last 24 Hrs  T(C): 36.9 (28 Jul 2023 15:30), Max: 37 (27 Jul 2023 21:22)  T(F): 98.4 (28 Jul 2023 15:30), Max: 98.6 (27 Jul 2023 21:22)  HR: 67 (28 Jul 2023 15:30) (67 - 75)  BP: 89/51 (28 Jul 2023 15:30) (89/51 - 100/59)  BP(mean): --  RR: 18 (28 Jul 2023 15:30) (18 - 18)  SpO2: 96% (28 Jul 2023 15:30) (92% - 96%)    Parameters below as of 28 Jul 2023 15:30  Patient On (Oxygen Delivery Method): room air      GENERAL: NAD, able to lie flat in bed  HEAD:  Atraumatic, Normocephalic  EYES: EOMI, PERRLA, normal sclera  ENT: Moist mucous membranes  NECK: Supple, No JVD, no nuchal rigidity  CHEST/LUNG: Clear to auscultation bilaterally; No rales, rhonchi, wheezing, or rubs. Unlabored respirations  HEART: Regular rate and rhythm; No murmurs, rubs, or gallops  ABDOMEN: Bowel sounds present; Soft, Nontender, Nondistended. No hepatomegaly  EXTREMITIES:  no pitting bilaterally  NERVOUS SYSTEM:  Alert & Oriented X3, speech clear. No focal motor or sensory deficits  MSK: FROM all 4 extremities, full and equal strength  SKIN: No rashes or lesions    RAD  < from: US Duplex Venous Lower Ext Complete, Bilateral (07.23.23 @ 17:20) >  No evidence of deep venous thrombosis in either lower extremity.  < from: CT Angio Chest PE Protocol w/ IV Cont (07.23.23 @ 15:05) >  1. New bilateral lower lobe consolidation and additional scattered   consolidative opacities, as described. Recommend plain film follow-up.  2. No evidence of pulmonary embolism.    LABS: All Labs Reviewed:                                            8.8    10.13 )-----------( 662      ( 28 Jul 2023 06:51 )             25.6     07-28    135  |  102  |  16  ----------------------------<  119<H>  4.2   |  30  |  0.75    Ca    8.2<L>      28 Jul 2023 14:13    TPro  7.3  /  Alb  2.1<L>  /  TBili  1.8<H>  /  DBili  x   /  AST  180<H>  /  ALT  139<H>  /  AlkPhos  94  07-28        LIVER FUNCTIONS - ( 28 Jul 2023 14:13 )  Alb: 2.1 g/dL / Pro: 7.3 gm/dL / ALK PHOS: 94 U/L / ALT: 139 U/L / AST: 180 U/L / GGT: x             Urinalysis Basic - ( 28 Jul 2023 14:13 )    Color: x / Appearance: x / SG: x / pH: x  Gluc: 119 mg/dL / Ketone: x  / Bili: x / Urobili: x   Blood: x / Protein: x / Nitrite: x   Leuk Esterase: x / RBC: x / WBC x   Sq Epi: x / Non Sq Epi: x / Bacteria: x

## 2023-07-28 NOTE — PROGRESS NOTE ADULT - ASSESSMENT
52 year old female PMHx significant for ulcerative colitis presents to the [facility] for further evaluation and management of symptoms of progressively worsening generalized weakness, malaise/fatigue, myalgias, subjective fevers  Found to have Babesiosis   CTA performed 7/23 due to increased O2 requirements- Findings represent bilateral consolidations with mild pleural effusions- consolidations have increased since 7/20.   On examination, she has diminished breath sounds at the bases and a cough with exhalation still  Started Symbicort temporarily  On Atovaquone/Azithromycin ; Ceftriaxone for pneumonia   ABG reviewed - 7.51/34/61  Dopplers negative  Now she is on 2L -2.5L NC O2 that is transient without active symptoms.   Transfused 1 U PRBC on 7/24 and 7/27  Will continue to monitor - she is not in acute respiratory distress   Reassess for ambulatory desaturations   Reviewed repeat XR, BNP normal and recommend to continue Lasix 40mg to assist with diuresis.   Reviewed echocardiogram- no findings of pulmonary hypertension but increased Pulmonic valve velocity noted.  Follow up H/H  Reviewed hemolysis panel, hematology to perform work up for HLH

## 2023-07-28 NOTE — PROGRESS NOTE ADULT - ASSESSMENT
Babesiosis on antibiotics with undetectable parasites  Persistent anemia  Poor marrow response to anemia as reflected by low/corrected reticulocyte count  Elevated LDH/low haptoglobin/elevated indirect bilirubin suggest ongoing hemolysis.  Possible pneumonia/     Differential diagnosis would include HLH syndrome (hemophagocytic Lymphohistiocytosis syndrome;)  The latter reflects an activated macrophage inflammatory response with RBC phagocytosis within the marrow, and associated markedly elevated ferritin, triglycerides, liver enzymes fevers.  This syndrome can occur after a variety of infectious/inflammatory processes and has been described with babesiosis.  Discussed the treatment with the patient and family and they are willing to proceed  steroids; will also recommend IR guided bone marrow biopsy and aspirate for confirmation, although will proceed with empiric treatment      Plan    Further laboratory data to include follow-up serum ferritin and triglycerides  Cytokine studies including soluble interleukin-2 receptor and gamma interferon., the latter is more specific for HLH  Bone marrow biopsy and aspirate.  Will consult with IR  Will empirically start on IV steroids  I discussed steroids with infectious disease, Dr JUAN Lizarraga,  and patient is cleared for a steroid therapy from an infection point of view    After discussion with multiple colleagues will not proceed with exchange transfusion given the absence of parasitemia.  However would recommend 1 unit single donor blood transfusion today given degree of anemia in the context of her dyspnea and low oxygen saturation

## 2023-07-28 NOTE — PROGRESS NOTE ADULT - ASSESSMENT
52 year old female PMHx significant for ulcerative colitis presents to the [facility] on 7/19/23  for further evaluation and management of symptoms of progressively worsening generalized weakness, malaise/fatigue, myalgias, subjective fevers (TMax at home 103.7'F), generalized abdominal discomfort and decreased PO intake which began on Friday (7/14).  The patient states that her symptoms progressed despite taking Tylenol and Motrin prn. Of note the patient reportedly tested negative for Influenza and COVID-19. Upon triage the patient met sepsis criteria => Vital signs in triage => BP 86/53, HR 70/min, RR 27/min, SpO2 97% on room air. In the ED CCM was consulted to further evaluate.     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  suspicion given elevated ferritin and low retic with anemia despite multiple transfusions.  check TG, fibrinogen.  further workup as per heme.  will transfuse another unit of PRBC today   -  DE LA ROSA C+S , Lyme Vise IgG/IgM AB Reflex, hepatitis, Ehrlichea/Anaplasma, all neg   - Babesia microti - PCR positive , - parasitemia 7.4% --> 2--> 1 % -> undetectable  - c/w  Atovaquone 750mg po q12h, . Azithromycin 500mg IVPB daily needs 10 days course , day 5 in the hospital  - cont. anti-pyretics prn  - tylenol alternating with ibuprofen  - 7/24 - 1 unit of PRBC with lasix 20 after   7/25 - Hb better, no role for exchange tf at this time   7/26 - spoke with dr villa. plan for 1 unit PRBC.    7/27 - spoke with dr villa and ray and plan for exchange transfusion but pt is refusing.  monitor hgb and check in AM   7/28 - workup for HLH , no exhange transfusion planned.  cases reported of HLH triggered by babesiosis     Acute hypoxic respiratory failure due to   atelectasis,  and bilateral gram negative lisa Pneumonia, not noted on admission   - CT chest - mild septal thickening in both lungs - ? edema, vs infection vs intracapillary hemolysis casusing this and this d/w pt and dr villa/ray and need for exahnge transfusion   - CTA 7/23 - New bilateral lower lobe consolidation,  doppler LE - no DVT    - 7/22 added Ceftriaxone  - 7/23 - s/p low dose lasix 20 mg   7/25 - Cont rocephin   - pt on atovaquone, ceftriaxone and zithromax    1.4 cm right hepatic lesion with transaminitis   - MRCP - likely hemangioma , o/p repeat test   - CT abd - noted  - hepatitis panel neg     Hypovolumic hyponatremia, Hypoalbuminemia - monitor Na    Hypokalemia, Hypophosphatemia -  replace    Hypocalcemia  -  vit D wnl     UC - add probiotics; stable, not on immunosuppressants  Anxiety ~cont. Clonazepam 0.5mg po qhs prn  ADHD ~will hold Dextroamphetamine-Amphetamine (takes 10mg po daily), takes Dextroamphetamine-Amphetamine ER 20mg daily prn  VTE PX ~cont. SCDs for now CTA - neg for PE, no DVT     DISPO  - abx, antipyretics , inpatient monitoring until cleared by ID and pulmonary and oncology  POC discussed with family as well

## 2023-07-28 NOTE — PROGRESS NOTE ADULT - SUBJECTIVE AND OBJECTIVE BOX
INTERVAL HISTORY:  Patient is a 52-year-old female who was admitted to the hospital on 7/19 with weakness, fatigue myalgias fevers.  Laboratory test revealed anemia, thrombocytopenia and a peripheral smear confirmed babesiosis initially at 7%.  She was seen by infectious disease placed on antibiotics.  She was also noted to have bilateral lung infiltrates and eventually bilateral lobe consolidations thought to be pneumonia followed by pulmonary medicine.  More recently babesiosis levels undetected confirmed on review of peripheral smear.  However she has continued to have anemia despite transfusions with a markedly elevated and rising LDH, low serum haptoglobin and indirect bilirubinemia.  Wero test is negative.  Peripheral smear negative for schistocytes.  Review of prior laboratory data revealed elevated serum ferritin initially felt to be an acute phase reactant however at this time given the constellation of findings she may have HLH syndrome.  The latter has been associated with infections/babesiosis.    We had previously discussed possible exchange transfusion however the parasite percentage was less than 1% when we had first evaluated her and now is undetectable.    On follow-up today she has some dyspnea with exertion and desaturates to 80%.  She has a dry cough, fatigue    Patient has had several blood transfusions however was not transfused yesterday    7/27 - pt tolerated transfusion yesterday . pt eager to go home but explained to her regarding need for bloodwork tomorrow and to speak with dr bell regarding exchange transfusion.       Presently data reveal LDH of 1500, haptoglobin less than 20, hemoglobin 8.2 g/dL reticulocyte count 4%.  Liver enzymes remain elevated      Allergies    No Known Allergies    Intolerances        MEDICATIONS  (STANDING):  albuterol    0.083% 2.5 milliGRAM(s) Nebulizer every 8 hours  albuterol    90 MICROgram(s) HFA Inhaler 1 Puff(s) Inhalation every 4 hours  atovaquone  Suspension 750 milliGRAM(s) Oral two times a day  azithromycin  IVPB 500 milliGRAM(s) IV Intermittent every 24 hours  budesonide 160 MICROgram(s)/formoterol 4.5 MICROgram(s) Inhaler 2 Puff(s) Inhalation two times a day  cyanocobalamin 1000 MICROGram(s) Oral daily  furosemide    Tablet 40 milliGRAM(s) Oral daily  magnesium oxide 400 milliGRAM(s) Oral at bedtime  methylPREDNISolone sodium succinate Injectable 60 milliGRAM(s) IV Push every 8 hours  multivitamin 1 Tablet(s) Oral daily  polyethylene glycol 3350 17 Gram(s) Oral two times a day  saccharomyces boulardii 250 milliGRAM(s) Oral two times a day  sodium chloride 0.9%. 1000 milliLiter(s) (100 mL/Hr) IV Continuous <Continuous>    MEDICATIONS  (PRN):  acetaminophen     Tablet .. 650 milliGRAM(s) Oral every 6 hours PRN Temp greater or equal to 38C (100.4F), Mild Pain (1 - 3)  aluminum hydroxide/magnesium hydroxide/simethicone Suspension 30 milliLiter(s) Oral every 4 hours PRN Dyspepsia  benzocaine/menthol Lozenge 1 Lozenge Oral four times a day PRN Sore Throat  ibuprofen  Tablet. 400 milliGRAM(s) Oral every 6 hours PRN Temp greater or equal to 38.5C (101.3F)  lidocaine   4% Patch 1 Patch Transdermal daily PRN Pain  melatonin 3 milliGRAM(s) Oral at bedtime PRN Insomnia  ondansetron Injectable 4 milliGRAM(s) IV Push every 8 hours PRN Nausea and/or Vomiting      Vital Signs Last 24 Hrs  T(C): 36.9 (28 Jul 2023 15:30), Max: 37 (27 Jul 2023 21:22)  T(F): 98.4 (28 Jul 2023 15:30), Max: 98.6 (27 Jul 2023 21:22)  HR: 67 (28 Jul 2023 15:30) (67 - 75)  BP: 89/51 (28 Jul 2023 15:30) (89/51 - 100/59)  BP(mean): --  RR: 18 (28 Jul 2023 15:30) (18 - 18)  SpO2: 96% (28 Jul 2023 15:30) (92% - 96%)    Parameters below as of 28 Jul 2023 15:30  Patient On (Oxygen Delivery Method): room air        PHYSICAL EXAM:    GENERAL: NAD,   HEAD:  Atraumatic, Normocephalic  EYES: EOMI, PERRLA, conjunctiva and sclera clear    NECK: Supple, No JVD, Normal thyroid  NERVOUS SYSTEM:    CHEST/LUNG: Dullness to percussion percussion bilaterally; No rales, rhonchi,   HEART: Regular rate and rhythm;   ABDOMEN: Soft, Nontender.  EXTREMITIES:   edema:-  LYMPH: No lymphadenopathy noted      Prior CT imaging no evidence of splenomegaly.      LABS:                        8.8    10.13 )-----------( 662      ( 28 Jul 2023 06:51 )             25.6     07-28    135  |  102  |  16  ----------------------------<  119<H>  4.2   |  30  |  0.75    Ca    8.2<L>      28 Jul 2023 14:13    TPro  7.3  /  Alb  2.1<L>  /  TBili  1.8<H>  /  DBili  x   /  AST  180<H>  /  ALT  139<H>  /  AlkPhos  94  07-28      Urinalysis Basic - ( 28 Jul 2023 14:13 )    Color: x / Appearance: x / SG: x / pH: x  Gluc: 119 mg/dL / Ketone: x  / Bili: x / Urobili: x   Blood: x / Protein: x / Nitrite: x   Leuk Esterase: x / RBC: x / WBC x   Sq Epi: x / Non Sq Epi: x / Bacteria: x    AST/ /139  Hb 8.8  LDH 1503  Hapto < 20    Retic 4 %    Prior ferritin > 7000

## 2023-07-29 LAB
ALBUMIN SERPL ELPH-MCNC: 2 G/DL — LOW (ref 3.3–5)
ALP SERPL-CCNC: 91 U/L — SIGNIFICANT CHANGE UP (ref 40–120)
ALT FLD-CCNC: 118 U/L — HIGH (ref 12–78)
ANION GAP SERPL CALC-SCNC: 2 MMOL/L — LOW (ref 5–17)
AST SERPL-CCNC: 123 U/L — HIGH (ref 15–37)
BILIRUB SERPL-MCNC: 1.4 MG/DL — HIGH (ref 0.2–1.2)
BUN SERPL-MCNC: 19 MG/DL — SIGNIFICANT CHANGE UP (ref 7–23)
CALCIUM SERPL-MCNC: 8.5 MG/DL — SIGNIFICANT CHANGE UP (ref 8.5–10.1)
CHLORIDE SERPL-SCNC: 109 MMOL/L — HIGH (ref 96–108)
CO2 SERPL-SCNC: 27 MMOL/L — SIGNIFICANT CHANGE UP (ref 22–31)
CREAT SERPL-MCNC: 0.6 MG/DL — SIGNIFICANT CHANGE UP (ref 0.5–1.3)
EGFR: 108 ML/MIN/1.73M2 — SIGNIFICANT CHANGE UP
FERRITIN SERPL-MCNC: 4769 NG/ML — HIGH (ref 13–330)
GLUCOSE SERPL-MCNC: 153 MG/DL — HIGH (ref 70–99)
HCT VFR BLD CALC: 26.1 % — LOW (ref 34.5–45)
HGB BLD-MCNC: 9.1 G/DL — LOW (ref 11.5–15.5)
LDH SERPL L TO P-CCNC: 1448 U/L — HIGH (ref 84–241)
MCHC RBC-ENTMCNC: 29.8 PG — SIGNIFICANT CHANGE UP (ref 27–34)
MCHC RBC-ENTMCNC: 34.9 GM/DL — SIGNIFICANT CHANGE UP (ref 32–36)
MCV RBC AUTO: 85.6 FL — SIGNIFICANT CHANGE UP (ref 80–100)
PLATELET # BLD AUTO: 693 K/UL — HIGH (ref 150–400)
POTASSIUM SERPL-MCNC: 4.7 MMOL/L — SIGNIFICANT CHANGE UP (ref 3.5–5.3)
POTASSIUM SERPL-SCNC: 4.7 MMOL/L — SIGNIFICANT CHANGE UP (ref 3.5–5.3)
PROT SERPL-MCNC: 7.2 GM/DL — SIGNIFICANT CHANGE UP (ref 6–8.3)
RBC # BLD: 3.05 M/UL — LOW (ref 3.8–5.2)
RBC # FLD: 15.8 % — HIGH (ref 10.3–14.5)
SODIUM SERPL-SCNC: 138 MMOL/L — SIGNIFICANT CHANGE UP (ref 135–145)
WBC # BLD: 11.69 K/UL — HIGH (ref 3.8–10.5)
WBC # FLD AUTO: 11.69 K/UL — HIGH (ref 3.8–10.5)

## 2023-07-29 PROCEDURE — 99233 SBSQ HOSP IP/OBS HIGH 50: CPT

## 2023-07-29 RX ORDER — FOLIC ACID 0.8 MG
1 TABLET ORAL DAILY
Refills: 0 | Status: DISCONTINUED | OUTPATIENT
Start: 2023-07-29 | End: 2023-08-01

## 2023-07-29 RX ADMIN — PREGABALIN 1000 MICROGRAM(S): 225 CAPSULE ORAL at 08:42

## 2023-07-29 RX ADMIN — ALBUTEROL 2.5 MILLIGRAM(S): 90 AEROSOL, METERED ORAL at 08:08

## 2023-07-29 RX ADMIN — BUDESONIDE AND FORMOTEROL FUMARATE DIHYDRATE 2 PUFF(S): 160; 4.5 AEROSOL RESPIRATORY (INHALATION) at 20:34

## 2023-07-29 RX ADMIN — Medication 250 MILLIGRAM(S): at 08:41

## 2023-07-29 RX ADMIN — Medication 1 MILLIGRAM(S): at 13:50

## 2023-07-29 RX ADMIN — Medication 60 MILLIGRAM(S): at 21:16

## 2023-07-29 RX ADMIN — ALBUTEROL 2.5 MILLIGRAM(S): 90 AEROSOL, METERED ORAL at 16:15

## 2023-07-29 RX ADMIN — SODIUM CHLORIDE 100 MILLILITER(S): 9 INJECTION INTRAMUSCULAR; INTRAVENOUS; SUBCUTANEOUS at 08:44

## 2023-07-29 RX ADMIN — POLYETHYLENE GLYCOL 3350 17 GRAM(S): 17 POWDER, FOR SOLUTION ORAL at 21:16

## 2023-07-29 RX ADMIN — MAGNESIUM OXIDE 400 MG ORAL TABLET 400 MILLIGRAM(S): 241.3 TABLET ORAL at 21:15

## 2023-07-29 RX ADMIN — Medication 250 MILLIGRAM(S): at 21:15

## 2023-07-29 RX ADMIN — Medication 1 TABLET(S): at 08:42

## 2023-07-29 RX ADMIN — Medication 3 MILLIGRAM(S): at 21:15

## 2023-07-29 RX ADMIN — POLYETHYLENE GLYCOL 3350 17 GRAM(S): 17 POWDER, FOR SOLUTION ORAL at 08:42

## 2023-07-29 RX ADMIN — AZITHROMYCIN 255 MILLIGRAM(S): 500 TABLET, FILM COATED ORAL at 13:50

## 2023-07-29 RX ADMIN — ATOVAQUONE 750 MILLIGRAM(S): 750 SUSPENSION ORAL at 21:14

## 2023-07-29 RX ADMIN — BUDESONIDE AND FORMOTEROL FUMARATE DIHYDRATE 2 PUFF(S): 160; 4.5 AEROSOL RESPIRATORY (INHALATION) at 08:09

## 2023-07-29 RX ADMIN — Medication 40 MILLIGRAM(S): at 08:42

## 2023-07-29 RX ADMIN — Medication 60 MILLIGRAM(S): at 13:50

## 2023-07-29 RX ADMIN — ATOVAQUONE 750 MILLIGRAM(S): 750 SUSPENSION ORAL at 08:41

## 2023-07-29 RX ADMIN — Medication 60 MILLIGRAM(S): at 06:44

## 2023-07-29 NOTE — PROGRESS NOTE ADULT - ASSESSMENT
52 year old female PMHx significant for ulcerative colitis presents to the [facility] on 7/19/23  for further evaluation and management of symptoms of progressively worsening generalized weakness, malaise/fatigue, myalgias, subjective fevers (TMax at home 103.7'F), generalized abdominal discomfort and decreased PO intake which began on Friday (7/14).  The patient states that her symptoms progressed despite taking Tylenol and Motrin prn. Of note the patient reportedly tested negative for Influenza and COVID-19. Upon triage the patient met sepsis criteria => Vital signs in triage => BP 86/53, HR 70/min, RR 27/min, SpO2 97% on room air. In the ED CCM was consulted to further evaluate.     Sepsis due to Acute Infectious Babesiosis, Fever   Anemia, worsening Hb drop from 12--> 7.7 , ruled out GI bleed with negative FOBT   thrombocytopenia, resolved now with suspicion for HLH ( hemophagocytic lymphohistiocytosis)  - case d/w dr villa today.  suspicion given elevated ferritin and low retic with anemia despite multiple transfusions.  check TG, fibrinogen.  further workup as per heme.  transfused unit PRBC on 7/28 and now hgb stable   -  PAN C+S , Lyme Vise IgG/IgM AB Reflex, hepatitis, Ehrlichea/Anaplasma, all neg   - Babesia microti - PCR positive , - parasitemia 7.4% --> 2--> 1 % -> undetectable  - c/w  Atovaquone 750mg po q12h, . Azithromycin 500mg IVPB daily needs 10 days course , day 5 in the hospital  - cont. anti-pyretics prn  - tylenol alternating with ibuprofen  - 7/24 - 1 unit of PRBC with lasix 20 after   7/25 - Hb better, no role for exchange tf at this time   7/26 - spoke with dr villa. plan for 1 unit PRBC.    7/27 - spoke with dr villa and ray and plan for exchange transfusion but pt is refusing.  monitor hgb and check in AM   7/28 - workup for HLH , no exhange transfusion planned.  cases reported of HLH triggered by babesiosis     Acute hypoxic respiratory failure due to   atelectasis,  and bilateral gram negative lisa Pneumonia, not noted on admission   - CT chest - mild septal thickening in both lungs - ? edema, vs infection vs intracapillary hemolysis casusing this and this d/w pt and dr villa/ray and need for exahnge transfusion   - CTA 7/23 - New bilateral lower lobe consolidation,  doppler LE - no DVT    - 7/22 added Ceftriaxone  - 7/23 - s/p low dose lasix 20 mg   7/25 - Cont rocephin   - pt on atovaquone, ceftriaxone and zithromax    1.4 cm right hepatic lesion with transaminitis   - MRCP - likely hemangioma , o/p repeat test   - CT abd - noted  - hepatitis panel neg       Hematuria - check UA, urine cx.  cr stable     UC - add probiotics; stable, not on immunosuppressants  Anxiety ~cont. Clonazepam 0.5mg po qhs prn  ADHD ~will hold Dextroamphetamine-Amphetamine (takes 10mg po daily), takes Dextroamphetamine-Amphetamine ER 20mg daily prn  VTE PX ~cont. SCDs for now CTA - neg for PE, no DVT     DISPO  - abx, antipyretics , inpatient monitoring until cleared by ID and pulmonary and oncology  POC discussed with family as well

## 2023-07-29 NOTE — PROGRESS NOTE ADULT - SUBJECTIVE AND OBJECTIVE BOX
52 year old female PMHx significant for ulcerative colitis presents to the [facility] on 7/19/23  for further evaluation and management of symptoms of progressively worsening generalized weakness, malaise/fatigue, myalgias, subjective fevers (TMax at home 103.7'F), generalized abdominal discomfort and decreased PO intake which began on Friday (7/14).  The patient states that her symptoms progressed despite taking Tylenol and Motrin prn. Of note the patient reportedly tested negative for Influenza and COVID-19. Upon triage the patient met sepsis criteria => Vital signs in triage => BP 86/53, HR 70/min, RR 27/min, SpO2 97% on room air. In the ED CCM was consulted to further evaluate.     7/20 - Patient seen and examined at bedside earlier today, + generalized chills, aches, poor po intake, generalized abdominal discomfort   7/21 - pt seen and examined in am, + febrile, + muscle aches, denies cp, + cough persist, denies cp, abdominal pain , + lose bm  7/22 - no events, still febrile, + cough, dyspnea overnight , denies cp, abdominal pain, eager to go home  7/23 - + hypoxia overnight on 4 L now, + cough, + dyspnea, + pleuritic chest pain, denies abdominal pain, plan discussed  7/24 - events noted episodes of hypoxemia, + cough, + pleuritic chest pain persist, denies abdominal pain, tolerating po intake, agreeable for blood transfusion  7/25 - no cp palps. mild cough, dry. shortness of breath requiring O2 via NC   7/26 - still some SOB.  pt seen by ana and agreeable to PRBC transfusion.    7/27 - pt tolerated transfusion yesterday . pt eager to go home but explained to her regarding need for bloodwork tomorrow and to speak with dr bell regarding exchange transfusion.   7/28 - no overnight events. pt still wtih some SOB. no cp. no fever   7/29 - pt with hematuria since yesterday but no increased frequency or dysuria. no fever or chills.  SOb improved. tolerated transfusion yesterdya     ROS:   All 10 systems reviewed and found to be negative with the exception of what has been described above.    PHYSICAL EXAM:      Vital Signs Last 24 Hrs  T(C): 36.8 (29 Jul 2023 07:48), Max: 37 (28 Jul 2023 20:05)  T(F): 98.2 (29 Jul 2023 07:48), Max: 98.6 (28 Jul 2023 20:05)  HR: 77 (29 Jul 2023 07:48) (67 - 77)  BP: 100/64 (29 Jul 2023 07:48) (89/51 - 104/58)  BP(mean): --  RR: 18 (29 Jul 2023 07:48) (18 - 18)  SpO2: 96% (29 Jul 2023 08:10) (91% - 100%)    Parameters below as of 29 Jul 2023 08:10  Patient On (Oxygen Delivery Method): nasal cannula, 2 LPM      GENERAL: NAD, able to lie flat in bed  HEAD:  Atraumatic, Normocephalic  EYES: EOMI, PERRLA, normal sclera  ENT: Moist mucous membranes  NECK: Supple, No JVD, no nuchal rigidity  CHEST/LUNG: Clear to auscultation bilaterally; No rales, rhonchi, wheezing, or rubs. Unlabored respirations  HEART: Regular rate and rhythm; No murmurs, rubs, or gallops  ABDOMEN: Bowel sounds present; Soft, Nontender, Nondistended. No hepatomegaly  EXTREMITIES:  no pitting bilaterally  NERVOUS SYSTEM:  Alert & Oriented X3, speech clear. No focal motor or sensory deficits  MSK: FROM all 4 extremities, full and equal strength  SKIN: No rashes or lesions                                9.1    11.69 )-----------( 693      ( 29 Jul 2023 07:06 )             26.1     07-29    138  |  109<H>  |  19  ----------------------------<  153<H>  4.7   |  27  |  0.60    Ca    8.5      29 Jul 2023 07:06    TPro  7.2  /  Alb  2.0<L>  /  TBili  1.4<H>  /  DBili  x   /  AST  123<H>  /  ALT  118<H>  /  AlkPhos  91  07-29        LIVER FUNCTIONS - ( 29 Jul 2023 07:06 )  Alb: 2.0 g/dL / Pro: 7.2 gm/dL / ALK PHOS: 91 U/L / ALT: 118 U/L / AST: 123 U/L / GGT: x             Urinalysis Basic - ( 29 Jul 2023 07:06 )    Color: x / Appearance: x / SG: x / pH: x  Gluc: 153 mg/dL / Ketone: x  / Bili: x / Urobili: x   Blood: x / Protein: x / Nitrite: x   Leuk Esterase: x / RBC: x / WBC x   Sq Epi: x / Non Sq Epi: x / Bacteria: x      RAD  < from: US Duplex Venous Lower Ext Complete, Bilateral (07.23.23 @ 17:20) >  No evidence of deep venous thrombosis in either lower extremity.  < from: CT Angio Chest PE Protocol w/ IV Cont (07.23.23 @ 15:05) >  1. New bilateral lower lobe consolidation and additional scattered   consolidative opacities, as described. Recommend plain film follow-up.  2. No evidence of pulmonary embolism.    LABS: All Labs Reviewed:

## 2023-07-29 NOTE — PROGRESS NOTE ADULT - SUBJECTIVE AND OBJECTIVE BOX
INTERVAL HISTORY:    REVIEW OF SYSTEMS:      Allergies    No Known Allergies    Intolerances        MEDICATIONS  (STANDING):  albuterol    0.083% 2.5 milliGRAM(s) Nebulizer every 8 hours  albuterol    90 MICROgram(s) HFA Inhaler 1 Puff(s) Inhalation every 4 hours  atovaquone  Suspension 750 milliGRAM(s) Oral two times a day  azithromycin  IVPB 500 milliGRAM(s) IV Intermittent every 24 hours  budesonide 160 MICROgram(s)/formoterol 4.5 MICROgram(s) Inhaler 2 Puff(s) Inhalation two times a day  cyanocobalamin 1000 MICROGram(s) Oral daily  furosemide    Tablet 40 milliGRAM(s) Oral daily  magnesium oxide 400 milliGRAM(s) Oral at bedtime  methylPREDNISolone sodium succinate Injectable 60 milliGRAM(s) IV Push every 8 hours  multivitamin 1 Tablet(s) Oral daily  polyethylene glycol 3350 17 Gram(s) Oral two times a day  saccharomyces boulardii 250 milliGRAM(s) Oral two times a day  sodium chloride 0.9%. 1000 milliLiter(s) (100 mL/Hr) IV Continuous <Continuous>    MEDICATIONS  (PRN):  acetaminophen     Tablet .. 650 milliGRAM(s) Oral every 6 hours PRN Temp greater or equal to 38C (100.4F), Mild Pain (1 - 3)  aluminum hydroxide/magnesium hydroxide/simethicone Suspension 30 milliLiter(s) Oral every 4 hours PRN Dyspepsia  benzocaine/menthol Lozenge 1 Lozenge Oral four times a day PRN Sore Throat  ibuprofen  Tablet. 400 milliGRAM(s) Oral every 6 hours PRN Temp greater or equal to 38.5C (101.3F)  lidocaine   4% Patch 1 Patch Transdermal daily PRN Pain  melatonin 3 milliGRAM(s) Oral at bedtime PRN Insomnia  ondansetron Injectable 4 milliGRAM(s) IV Push every 8 hours PRN Nausea and/or Vomiting      Vital Signs Last 24 Hrs  T(C): 36.8 (29 Jul 2023 07:48), Max: 37 (28 Jul 2023 20:05)  T(F): 98.2 (29 Jul 2023 07:48), Max: 98.6 (28 Jul 2023 20:05)  HR: 77 (29 Jul 2023 07:48) (67 - 77)  BP: 100/64 (29 Jul 2023 07:48) (89/51 - 104/58)  BP(mean): --  RR: 18 (29 Jul 2023 07:48) (18 - 18)  SpO2: 95% (29 Jul 2023 07:48) (91% - 100%)    Parameters below as of 29 Jul 2023 07:48  Patient On (Oxygen Delivery Method): nasal cannula  O2 Flow (L/min): 2      PHYSICAL EXAM:    GENERAL: NAD,   HEAD:  Atraumatic, Normocephalic  EYES: EOMI, PERRLA, conjunctiva and sclera clear    NECK: Supple, No JVD, Normal thyroid  NERVOUS SYSTEM:    CHEST/LUNG: Clear to percussion bilaterally; No rales, rhonchi,   HEART: Regular rate and rhythm;   ABDOMEN: Soft, Nontender.  EXTREMITIES:   edema:-  LYMPH: No lymphadenopathy noted        LABS:                        9.1    11.69 )-----------( 693      ( 29 Jul 2023 07:06 )             26.1     07-29    138  |  109<H>  |  19  ----------------------------<  153<H>  4.7   |  27  |  0.60    Ca    8.5      29 Jul 2023 07:06    TPro  7.2  /  Alb  2.0<L>  /  TBili  1.4<H>  /  DBili  x   /  AST  123<H>  /  ALT  118<H>  /  AlkPhos  91  07-29      Urinalysis Basic - ( 29 Jul 2023 07:06 )    Color: x / Appearance: x / SG: x / pH: x  Gluc: 153 mg/dL / Ketone: x  / Bili: x / Urobili: x   Blood: x / Protein: x / Nitrite: x   Leuk Esterase: x / RBC: x / WBC x   Sq Epi: x / Non Sq Epi: x / Bacteria: x          RADIOLOGY & ADDITIONAL STUDIES:    PATHOLOGY:         INTERVAL HISTORY:    53 y/o female with Babesiosis , lung infiltrates / consolidations, resolved parasitemia yet ongoing hemolysis , tepid reticulocyte response ; Elevated ferritin / LFT s : suggest HLH   syndrome ; started IV steroids yesterday ; clinically stable today   Had blood in urine     REVIEW OF SYSTEMS:      Allergies    No Known Allergies    Intolerances        MEDICATIONS  (STANDING):  albuterol    0.083% 2.5 milliGRAM(s) Nebulizer every 8 hours  albuterol    90 MICROgram(s) HFA Inhaler 1 Puff(s) Inhalation every 4 hours  atovaquone  Suspension 750 milliGRAM(s) Oral two times a day  azithromycin  IVPB 500 milliGRAM(s) IV Intermittent every 24 hours  budesonide 160 MICROgram(s)/formoterol 4.5 MICROgram(s) Inhaler 2 Puff(s) Inhalation two times a day  cyanocobalamin 1000 MICROGram(s) Oral daily  furosemide    Tablet 40 milliGRAM(s) Oral daily  magnesium oxide 400 milliGRAM(s) Oral at bedtime  methylPREDNISolone sodium succinate Injectable 60 milliGRAM(s) IV Push every 8 hours  multivitamin 1 Tablet(s) Oral daily  polyethylene glycol 3350 17 Gram(s) Oral two times a day  saccharomyces boulardii 250 milliGRAM(s) Oral two times a day  sodium chloride 0.9%. 1000 milliLiter(s) (100 mL/Hr) IV Continuous <Continuous>    MEDICATIONS  (PRN):  acetaminophen     Tablet .. 650 milliGRAM(s) Oral every 6 hours PRN Temp greater or equal to 38C (100.4F), Mild Pain (1 - 3)  aluminum hydroxide/magnesium hydroxide/simethicone Suspension 30 milliLiter(s) Oral every 4 hours PRN Dyspepsia  benzocaine/menthol Lozenge 1 Lozenge Oral four times a day PRN Sore Throat  ibuprofen  Tablet. 400 milliGRAM(s) Oral every 6 hours PRN Temp greater or equal to 38.5C (101.3F)  lidocaine   4% Patch 1 Patch Transdermal daily PRN Pain  melatonin 3 milliGRAM(s) Oral at bedtime PRN Insomnia  ondansetron Injectable 4 milliGRAM(s) IV Push every 8 hours PRN Nausea and/or Vomiting      Vital Signs Last 24 Hrs  T(C): 36.8 (29 Jul 2023 07:48), Max: 37 (28 Jul 2023 20:05)  T(F): 98.2 (29 Jul 2023 07:48), Max: 98.6 (28 Jul 2023 20:05)  HR: 77 (29 Jul 2023 07:48) (67 - 77)  BP: 100/64 (29 Jul 2023 07:48) (89/51 - 104/58)  BP(mean): --  RR: 18 (29 Jul 2023 07:48) (18 - 18)  SpO2: 95% (29 Jul 2023 07:48) (91% - 100%)    Parameters below as of 29 Jul 2023 07:48  Patient On (Oxygen Delivery Method): nasal cannula  O2 Flow (L/min): 2      PHYSICAL EXAM:    GENERAL: NAD,   HEAD:  Atraumatic, Normocephalic  EYES: EOMI, PERRLA, conjunctiva and sclera clear    NECK: Supple, No JVD, Normal thyroid  NERVOUS SYSTEM:    CHEST/LUNG: lower dullness percussion   HEART: Regular rate and rhythm;   ABDOMEN: Soft, Nontender.  EXTREMITIES:   edema:-  LYMPH: No lymphadenopathy noted        LABS:                        9.1    11.69 )-----------( 693      ( 29 Jul 2023 07:06 )             26.1     07-29    138  |  109<H>  |  19  ----------------------------<  153<H>  4.7   |  27  |  0.60    Ca    8.5      29 Jul 2023 07:06    TPro  7.2  /  Alb  2.0<L>  /  TBili  1.4<H>  /  DBili  x   /  AST  123<H>  /  ALT  118<H>  /  AlkPhos  91  07-29      Urinalysis Basic - ( 29 Jul 2023 07:06 )    Color: x / Appearance: x / SG: x / pH: x  Gluc: 153 mg/dL / Ketone: x  / Bili: x / Urobili: x   Blood: x / Protein: x / Nitrite: x   Leuk Esterase: x / RBC: x / WBC x   Sq Epi: x / Non Sq Epi: x / Bacteria: x                 INTERVAL HISTORY:    53 y/o female with Babesiosis , lung infiltrates / consolidations, resolved parasitemia yet ongoing hemolysis , tepid reticulocyte response ; Elevated ferritin / LFT s : suggest HLH   syndrome ; started IV steroids yesterday ; clinically stable today   Triglycerides / sIL2 R and Gamma IFN pending  [place] tentatively  Monday    Had blood in urine / gross hematuria  No dysuria / frequency / urgency   No back pains    Breathing stable/ no worsening  No fevers      Allergies    No Known Allergies    Intolerances        MEDICATIONS  (STANDING):  albuterol    0.083% 2.5 milliGRAM(s) Nebulizer every 8 hours  albuterol    90 MICROgram(s) HFA Inhaler 1 Puff(s) Inhalation every 4 hours  atovaquone  Suspension 750 milliGRAM(s) Oral two times a day  azithromycin  IVPB 500 milliGRAM(s) IV Intermittent every 24 hours  budesonide 160 MICROgram(s)/formoterol 4.5 MICROgram(s) Inhaler 2 Puff(s) Inhalation two times a day  cyanocobalamin 1000 MICROGram(s) Oral daily  furosemide    Tablet 40 milliGRAM(s) Oral daily  magnesium oxide 400 milliGRAM(s) Oral at bedtime  methylPREDNISolone sodium succinate Injectable 60 milliGRAM(s) IV Push every 8 hours  multivitamin 1 Tablet(s) Oral daily  polyethylene glycol 3350 17 Gram(s) Oral two times a day  saccharomyces boulardii 250 milliGRAM(s) Oral two times a day  sodium chloride 0.9%. 1000 milliLiter(s) (100 mL/Hr) IV Continuous <Continuous>    MEDICATIONS  (PRN):  acetaminophen     Tablet .. 650 milliGRAM(s) Oral every 6 hours PRN Temp greater or equal to 38C (100.4F), Mild Pain (1 - 3)  aluminum hydroxide/magnesium hydroxide/simethicone Suspension 30 milliLiter(s) Oral every 4 hours PRN Dyspepsia  benzocaine/menthol Lozenge 1 Lozenge Oral four times a day PRN Sore Throat  ibuprofen  Tablet. 400 milliGRAM(s) Oral every 6 hours PRN Temp greater or equal to 38.5C (101.3F)  lidocaine   4% Patch 1 Patch Transdermal daily PRN Pain  melatonin 3 milliGRAM(s) Oral at bedtime PRN Insomnia  ondansetron Injectable 4 milliGRAM(s) IV Push every 8 hours PRN Nausea and/or Vomiting      Vital Signs Last 24 Hrs  T(C): 36.8 (29 Jul 2023 07:48), Max: 37 (28 Jul 2023 20:05)  T(F): 98.2 (29 Jul 2023 07:48), Max: 98.6 (28 Jul 2023 20:05)  HR: 77 (29 Jul 2023 07:48) (67 - 77)  BP: 100/64 (29 Jul 2023 07:48) (89/51 - 104/58)  BP(mean): --  RR: 18 (29 Jul 2023 07:48) (18 - 18)  SpO2: 95% (29 Jul 2023 07:48) (91% - 100%)    Parameters below as of 29 Jul 2023 07:48  Patient On (Oxygen Delivery Method): nasal cannula  O2 Flow (L/min): 2      PHYSICAL EXAM:    GENERAL: NAD,   HEAD:  Atraumatic, Normocephalic  EYES: EOMI, PERRLA, conjunctiva and sclera clear    NECK: Supple, No JVD, Normal thyroid  NERVOUS SYSTEM:    CHEST/LUNG: lower dullness percussion   HEART: Regular rate and rhythm;   ABDOMEN: Soft, Nontender.  EXTREMITIES:   edema:-  LYMPH: No lymphadenopathy noted        LABS:                        9.1    11.69 )-----------( 693      ( 29 Jul 2023 07:06 )             26.1     07-29    138  |  109<H>  |  19  ----------------------------<  153<H>  4.7   |  27  |  0.60    Ca    8.5      29 Jul 2023 07:06    TPro  7.2  /  Alb  2.0<L>  /  TBili  1.4<H>  /  DBili  x   /  AST  123<H>  /  ALT  118<H>  /  AlkPhos  91  07-29      Urinalysis Basic - ( 29 Jul 2023 07:06 )    Color: x / Appearance: x / SG: x / pH: x  Gluc: 153 mg/dL / Ketone: x  / Bili: x / Urobili: x   Blood: x / Protein: x / Nitrite: x   Leuk Esterase: x / RBC: x / WBC x   Sq Epi: x / Non Sq Epi: x / Bacteria: x                 INTERVAL HISTORY:    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 , lung infiltrates / consolidations, resolved parasitemia yet ongoing hemolysis , tepid reticulocyte response ; Elevated ferritin / LFT s : suggested  HLH   syndrome ; started IV steroids yesterday ; clinically stable today   Triglycerides / sIL2 R and Gamma IFN pending  [place] tentatively  Monday    Had blood in urine / gross hematuria  No dysuria / frequency / urgency   No back pains    Breathing stable/ no worsening  No fevers      Allergies    No Known Allergies    Intolerances        MEDICATIONS  (STANDING):  albuterol    0.083% 2.5 milliGRAM(s) Nebulizer every 8 hours  albuterol    90 MICROgram(s) HFA Inhaler 1 Puff(s) Inhalation every 4 hours  atovaquone  Suspension 750 milliGRAM(s) Oral two times a day  azithromycin  IVPB 500 milliGRAM(s) IV Intermittent every 24 hours  budesonide 160 MICROgram(s)/formoterol 4.5 MICROgram(s) Inhaler 2 Puff(s) Inhalation two times a day  cyanocobalamin 1000 MICROGram(s) Oral daily  furosemide    Tablet 40 milliGRAM(s) Oral daily  magnesium oxide 400 milliGRAM(s) Oral at bedtime  methylPREDNISolone sodium succinate Injectable 60 milliGRAM(s) IV Push every 8 hours  multivitamin 1 Tablet(s) Oral daily  polyethylene glycol 3350 17 Gram(s) Oral two times a day  saccharomyces boulardii 250 milliGRAM(s) Oral two times a day  sodium chloride 0.9%. 1000 milliLiter(s) (100 mL/Hr) IV Continuous <Continuous>    MEDICATIONS  (PRN):  acetaminophen     Tablet .. 650 milliGRAM(s) Oral every 6 hours PRN Temp greater or equal to 38C (100.4F), Mild Pain (1 - 3)  aluminum hydroxide/magnesium hydroxide/simethicone Suspension 30 milliLiter(s) Oral every 4 hours PRN Dyspepsia  benzocaine/menthol Lozenge 1 Lozenge Oral four times a day PRN Sore Throat  ibuprofen  Tablet. 400 milliGRAM(s) Oral every 6 hours PRN Temp greater or equal to 38.5C (101.3F)  lidocaine   4% Patch 1 Patch Transdermal daily PRN Pain  melatonin 3 milliGRAM(s) Oral at bedtime PRN Insomnia  ondansetron Injectable 4 milliGRAM(s) IV Push every 8 hours PRN Nausea and/or Vomiting      Vital Signs Last 24 Hrs  T(C): 36.8 (29 Jul 2023 07:48), Max: 37 (28 Jul 2023 20:05)  T(F): 98.2 (29 Jul 2023 07:48), Max: 98.6 (28 Jul 2023 20:05)  HR: 77 (29 Jul 2023 07:48) (67 - 77)  BP: 100/64 (29 Jul 2023 07:48) (89/51 - 104/58)  BP(mean): --  RR: 18 (29 Jul 2023 07:48) (18 - 18)  SpO2: 95% (29 Jul 2023 07:48) (91% - 100%)    Parameters below as of 29 Jul 2023 07:48  Patient On (Oxygen Delivery Method): nasal cannula  O2 Flow (L/min): 2      PHYSICAL EXAM:    GENERAL: NAD,   HEAD:  Atraumatic, Normocephalic  EYES: EOMI, PERRLA, conjunctiva and sclera clear    NECK: Supple, No JVD, Normal thyroid  NERVOUS SYSTEM:    CHEST/LUNG: lower dullness percussion   HEART: Regular rate and rhythm;   ABDOMEN: Soft, Nontender.  EXTREMITIES:   edema:-  LYMPH: No lymphadenopathy noted        LABS:                        9.1    11.69 )-----------( 693      ( 29 Jul 2023 07:06 )             26.1     07-29    138  |  109<H>  |  19  ----------------------------<  153<H>  4.7   |  27  |  0.60    Ca    8.5      29 Jul 2023 07:06    TPro  7.2  /  Alb  2.0<L>  /  TBili  1.4<H>  /  DBili  x   /  AST  123<H>  /  ALT  118<H>  /  AlkPhos  91  07-29      Urinalysis Basic - ( 29 Jul 2023 07:06 )    Color: x / Appearance: x / SG: x / pH: x  Gluc: 153 mg/dL / Ketone: x  / Bili: x / Urobili: x   Blood: x / Protein: x / Nitrite: x   Leuk Esterase: x / RBC: x / WBC x   Sq Epi: x / Non Sq Epi: x / Bacteria: x                 INTERVAL HISTORY:    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 , lung infiltrates / consolidations, resolved parasitemia yet ongoing hemolysis , tepid reticulocyte response ; Elevated ferritin / LFT s : suggested  HLH   syndrome ; started IV steroids yesterday ; clinically stable today   Triglycerides / sIL2 R and Gamma IFN pending  [place] tentatively  Monday  Has progressive thrombocytosis   Ongoing hemolysis       Had blood in urine / gross hematuria  No dysuria / frequency / urgency   No back pains    Breathing stable/ no worsening  No fevers      Allergies    No Known Allergies    Intolerances        MEDICATIONS  (STANDING):  albuterol    0.083% 2.5 milliGRAM(s) Nebulizer every 8 hours  albuterol    90 MICROgram(s) HFA Inhaler 1 Puff(s) Inhalation every 4 hours  atovaquone  Suspension 750 milliGRAM(s) Oral two times a day  azithromycin  IVPB 500 milliGRAM(s) IV Intermittent every 24 hours  budesonide 160 MICROgram(s)/formoterol 4.5 MICROgram(s) Inhaler 2 Puff(s) Inhalation two times a day  cyanocobalamin 1000 MICROGram(s) Oral daily  furosemide    Tablet 40 milliGRAM(s) Oral daily  magnesium oxide 400 milliGRAM(s) Oral at bedtime  methylPREDNISolone sodium succinate Injectable 60 milliGRAM(s) IV Push every 8 hours  multivitamin 1 Tablet(s) Oral daily  polyethylene glycol 3350 17 Gram(s) Oral two times a day  saccharomyces boulardii 250 milliGRAM(s) Oral two times a day  sodium chloride 0.9%. 1000 milliLiter(s) (100 mL/Hr) IV Continuous <Continuous>    MEDICATIONS  (PRN):  acetaminophen     Tablet .. 650 milliGRAM(s) Oral every 6 hours PRN Temp greater or equal to 38C (100.4F), Mild Pain (1 - 3)  aluminum hydroxide/magnesium hydroxide/simethicone Suspension 30 milliLiter(s) Oral every 4 hours PRN Dyspepsia  benzocaine/menthol Lozenge 1 Lozenge Oral four times a day PRN Sore Throat  ibuprofen  Tablet. 400 milliGRAM(s) Oral every 6 hours PRN Temp greater or equal to 38.5C (101.3F)  lidocaine   4% Patch 1 Patch Transdermal daily PRN Pain  melatonin 3 milliGRAM(s) Oral at bedtime PRN Insomnia  ondansetron Injectable 4 milliGRAM(s) IV Push every 8 hours PRN Nausea and/or Vomiting      Vital Signs Last 24 Hrs  T(C): 36.8 (29 Jul 2023 07:48), Max: 37 (28 Jul 2023 20:05)  T(F): 98.2 (29 Jul 2023 07:48), Max: 98.6 (28 Jul 2023 20:05)  HR: 77 (29 Jul 2023 07:48) (67 - 77)  BP: 100/64 (29 Jul 2023 07:48) (89/51 - 104/58)  BP(mean): --  RR: 18 (29 Jul 2023 07:48) (18 - 18)  SpO2: 95% (29 Jul 2023 07:48) (91% - 100%)    Parameters below as of 29 Jul 2023 07:48  Patient On (Oxygen Delivery Method): nasal cannula  O2 Flow (L/min): 2      PHYSICAL EXAM:    GENERAL: NAD,   HEAD:  Atraumatic, Normocephalic  EYES: EOMI, PERRLA, conjunctiva and sclera clear    NECK: Supple, No JVD, Normal thyroid  NERVOUS SYSTEM:    CHEST/LUNG: lower dullness percussion   HEART: Regular rate and rhythm;   ABDOMEN: Soft, Nontender.  EXTREMITIES:   edema:-  LYMPH: No lymphadenopathy noted        LABS:                        9.1    11.69 )-----------( 693      ( 29 Jul 2023 07:06 )             26.1     07-29    138  |  109<H>  |  19  ----------------------------<  153<H>  4.7   |  27  |  0.60    Ca    8.5      29 Jul 2023 07:06    TPro  7.2  /  Alb  2.0<L>  /  TBili  1.4<H>  /  DBili  x   /  AST  123<H>  /  ALT  118<H>  /  AlkPhos  91  07-29      Urinalysis Basic - ( 29 Jul 2023 07:06 )    Color: x / Appearance: x / SG: x / pH: x  Gluc: 153 mg/dL / Ketone: x  / Bili: x / Urobili: x   Blood: x / Protein: x / Nitrite: x   Leuk Esterase: x / RBC: x / WBC x   Sq Epi: x / Non Sq Epi: x / Bacteria: x                 INTERVAL HISTORY:    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 , lung infiltrates / consolidations, resolved parasitemia yet ongoing hemolysis , tepid reticulocyte response ; Elevated ferritin / LFT s : suggested  HLH   syndrome ; started IV steroids yesterday ; clinically stable today   Triglycerides / sIL2 R and Gamma IFN pending  [place] tentatively  Monday  Has progressive thrombocytosis   Ongoing hemolysis       Had blood in urine / gross hematuria  No dysuria / frequency / urgency   No back pains    Breathing stable/ no worsening  No fevers      Allergies    No Known Allergies    Intolerances        MEDICATIONS  (STANDING):  albuterol    0.083% 2.5 milliGRAM(s) Nebulizer every 8 hours  albuterol    90 MICROgram(s) HFA Inhaler 1 Puff(s) Inhalation every 4 hours  atovaquone  Suspension 750 milliGRAM(s) Oral two times a day  azithromycin  IVPB 500 milliGRAM(s) IV Intermittent every 24 hours  budesonide 160 MICROgram(s)/formoterol 4.5 MICROgram(s) Inhaler 2 Puff(s) Inhalation two times a day  cyanocobalamin 1000 MICROGram(s) Oral daily  furosemide    Tablet 40 milliGRAM(s) Oral daily  magnesium oxide 400 milliGRAM(s) Oral at bedtime  methylPREDNISolone sodium succinate Injectable 60 milliGRAM(s) IV Push every 8 hours  multivitamin 1 Tablet(s) Oral daily  polyethylene glycol 3350 17 Gram(s) Oral two times a day  saccharomyces boulardii 250 milliGRAM(s) Oral two times a day  sodium chloride 0.9%. 1000 milliLiter(s) (100 mL/Hr) IV Continuous <Continuous>    MEDICATIONS  (PRN):  acetaminophen     Tablet .. 650 milliGRAM(s) Oral every 6 hours PRN Temp greater or equal to 38C (100.4F), Mild Pain (1 - 3)  aluminum hydroxide/magnesium hydroxide/simethicone Suspension 30 milliLiter(s) Oral every 4 hours PRN Dyspepsia  benzocaine/menthol Lozenge 1 Lozenge Oral four times a day PRN Sore Throat  ibuprofen  Tablet. 400 milliGRAM(s) Oral every 6 hours PRN Temp greater or equal to 38.5C (101.3F)  lidocaine   4% Patch 1 Patch Transdermal daily PRN Pain  melatonin 3 milliGRAM(s) Oral at bedtime PRN Insomnia  ondansetron Injectable 4 milliGRAM(s) IV Push every 8 hours PRN Nausea and/or Vomiting      Vital Signs Last 24 Hrs  T(C): 36.8 (29 Jul 2023 07:48), Max: 37 (28 Jul 2023 20:05)  T(F): 98.2 (29 Jul 2023 07:48), Max: 98.6 (28 Jul 2023 20:05)  HR: 77 (29 Jul 2023 07:48) (67 - 77)  BP: 100/64 (29 Jul 2023 07:48) (89/51 - 104/58)  BP(mean): --  RR: 18 (29 Jul 2023 07:48) (18 - 18)  SpO2: 95% (29 Jul 2023 07:48) (91% - 100%)    Parameters below as of 29 Jul 2023 07:48  Patient On (Oxygen Delivery Method): nasal cannula  O2 Flow (L/min): 2      PHYSICAL EXAM:    GENERAL: NAD,   HEAD:  Atraumatic, Normocephalic  EYES: EOMI, PERRLA, conjunctiva and sclera clear    NECK: Supple, No JVD, Normal thyroid  NERVOUS SYSTEM:    CHEST/LUNG: lower dullness percussion   HEART: Regular rate and rhythm;   ABDOMEN: Soft, Nontender.  EXTREMITIES:   edema:-  LYMPH: No lymphadenopathy noted        LABS:                        9.1    11.69 )-----------( 693      ( 29 Jul 2023 07:06 )             26.1     07-29    138  |  109<H>  |  19  ----------------------------<  153<H>  4.7   |  27  |  0.60    Ca    8.5      29 Jul 2023 07:06    TPro  7.2  /  Alb  2.0<L>  /  TBili  1.4<H>  /  DBili  x   /  AST  123<H>  /  ALT  118<H>  /  AlkPhos  91  07-29    Fibrinogen : 427    Urinalysis Basic - ( 29 Jul 2023 07:06 )    Color: x / Appearance: x / SG: x / pH: x  Gluc: 153 mg/dL / Ketone: x  / Bili: x / Urobili: x   Blood: x / Protein: x / Nitrite: x   Leuk Esterase: x / RBC: x / WBC x   Sq Epi: x / Non Sq Epi: x / Bacteria: x

## 2023-07-29 NOTE — PROGRESS NOTE ADULT - ASSESSMENT
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 ( Babesiosis) /       PLAN    IV steroids/ will continue   FU Labs in am  Check sIL2R / Gamma IFN / Triglycerides ( I called lab, all pending; Ferritin   Will also check G6PD ( Mepron hemolysis ?)/ Rising platelets not typical HLH  Would DC Mepron : discussed with ID / in agreement / R/O mepron hemolysis   Urine culture/ repeat UA       Addendum    Thrombocytosis / elevated fibrinogen / normal spleen speak against HLH      Sonogram Kidneys / Duplex if no infection etc ( Renal vein thrombosis )    Pulm FU

## 2023-07-30 LAB
ANION GAP SERPL CALC-SCNC: 8 MMOL/L — SIGNIFICANT CHANGE UP (ref 5–17)
ANISOCYTOSIS BLD QL: SLIGHT — SIGNIFICANT CHANGE UP
APPEARANCE UR: CLEAR — SIGNIFICANT CHANGE UP
APTT BLD: 24.9 SEC — SIGNIFICANT CHANGE UP (ref 24.5–35.6)
BACTERIA # UR AUTO: ABNORMAL
BASOPHILS # BLD AUTO: 0 K/UL — SIGNIFICANT CHANGE UP (ref 0–0.2)
BASOPHILS NFR BLD AUTO: 0 % — SIGNIFICANT CHANGE UP (ref 0–2)
BILIRUB UR-MCNC: NEGATIVE — SIGNIFICANT CHANGE UP
BUN SERPL-MCNC: 19 MG/DL — SIGNIFICANT CHANGE UP (ref 7–23)
CALCIUM SERPL-MCNC: 8.4 MG/DL — LOW (ref 8.5–10.1)
CHLORIDE SERPL-SCNC: 107 MMOL/L — SIGNIFICANT CHANGE UP (ref 96–108)
CO2 SERPL-SCNC: 24 MMOL/L — SIGNIFICANT CHANGE UP (ref 22–31)
COLOR SPEC: YELLOW — SIGNIFICANT CHANGE UP
COMMENT - URINE: SIGNIFICANT CHANGE UP
CREAT SERPL-MCNC: 0.6 MG/DL — SIGNIFICANT CHANGE UP (ref 0.5–1.3)
D DIMER BLD IA.RAPID-MCNC: 673 NG/ML DDU — HIGH
DIFF PNL FLD: ABNORMAL
EGFR: 108 ML/MIN/1.73M2 — SIGNIFICANT CHANGE UP
EOSINOPHIL # BLD AUTO: 0 K/UL — SIGNIFICANT CHANGE UP (ref 0–0.5)
EOSINOPHIL NFR BLD AUTO: 0 % — SIGNIFICANT CHANGE UP (ref 0–6)
EPI CELLS # UR: SIGNIFICANT CHANGE UP
FIBRINOGEN PPP-MCNC: 382 MG/DL — SIGNIFICANT CHANGE UP (ref 200–435)
FIBRINOGEN PPP-MCNC: 427 MG/DL — SIGNIFICANT CHANGE UP (ref 200–435)
GLUCOSE SERPL-MCNC: 131 MG/DL — HIGH (ref 70–99)
GLUCOSE UR QL: NEGATIVE — SIGNIFICANT CHANGE UP
GRAN CASTS # UR COMP ASSIST: ABNORMAL /LPF
HAPTOGLOB SERPL-MCNC: <20 MG/DL — LOW (ref 34–200)
HCT VFR BLD CALC: 24.4 % — LOW (ref 34.5–45)
HCT VFR BLD CALC: 24.8 % — LOW (ref 34.5–45)
HGB BLD-MCNC: 8.3 G/DL — LOW (ref 11.5–15.5)
HGB BLD-MCNC: 8.5 G/DL — LOW (ref 11.5–15.5)
IL2 SERPL-MCNC: 3531 PG/ML — HIGH (ref 175.3–858.2)
INR BLD: 1.13 RATIO — SIGNIFICANT CHANGE UP (ref 0.85–1.18)
KETONES UR-MCNC: NEGATIVE — SIGNIFICANT CHANGE UP
LDH SERPL L TO P-CCNC: 1577 U/L — HIGH (ref 84–241)
LEUKOCYTE ESTERASE UR-ACNC: NEGATIVE — SIGNIFICANT CHANGE UP
LYMPHOCYTES # BLD AUTO: 1.08 K/UL — SIGNIFICANT CHANGE UP (ref 1–3.3)
LYMPHOCYTES # BLD AUTO: 6 % — LOW (ref 13–44)
MACROCYTES BLD QL: SLIGHT — SIGNIFICANT CHANGE UP
MANUAL SMEAR VERIFICATION: SIGNIFICANT CHANGE UP
MCHC RBC-ENTMCNC: 29.7 PG — SIGNIFICANT CHANGE UP (ref 27–34)
MCHC RBC-ENTMCNC: 30 PG — SIGNIFICANT CHANGE UP (ref 27–34)
MCHC RBC-ENTMCNC: 34 GM/DL — SIGNIFICANT CHANGE UP (ref 32–36)
MCHC RBC-ENTMCNC: 34.3 GM/DL — SIGNIFICANT CHANGE UP (ref 32–36)
MCV RBC AUTO: 87.5 FL — SIGNIFICANT CHANGE UP (ref 80–100)
MCV RBC AUTO: 87.6 FL — SIGNIFICANT CHANGE UP (ref 80–100)
MONOCYTES # BLD AUTO: 1.62 K/UL — HIGH (ref 0–0.9)
MONOCYTES NFR BLD AUTO: 9 % — SIGNIFICANT CHANGE UP (ref 2–14)
NEUTROPHILS # BLD AUTO: 15.32 K/UL — HIGH (ref 1.8–7.4)
NEUTROPHILS NFR BLD AUTO: 85 % — HIGH (ref 43–77)
NITRITE UR-MCNC: NEGATIVE — SIGNIFICANT CHANGE UP
NRBC # BLD: 0 /100 — SIGNIFICANT CHANGE UP (ref 0–0)
NRBC # BLD: SIGNIFICANT CHANGE UP /100 WBCS (ref 0–0)
PH UR: 6 — SIGNIFICANT CHANGE UP (ref 5–8)
PLAT MORPH BLD: NORMAL — SIGNIFICANT CHANGE UP
PLATELET # BLD AUTO: 831 K/UL — HIGH (ref 150–400)
PLATELET # BLD AUTO: 847 K/UL — HIGH (ref 150–400)
POLYCHROMASIA BLD QL SMEAR: SLIGHT — SIGNIFICANT CHANGE UP
POTASSIUM SERPL-MCNC: 4.4 MMOL/L — SIGNIFICANT CHANGE UP (ref 3.5–5.3)
POTASSIUM SERPL-SCNC: 4.4 MMOL/L — SIGNIFICANT CHANGE UP (ref 3.5–5.3)
PROT UR-MCNC: NEGATIVE — SIGNIFICANT CHANGE UP
PROTHROM AB SERPL-ACNC: 12.7 SEC — SIGNIFICANT CHANGE UP (ref 9.5–13)
RBC # BLD: 2.79 M/UL — LOW (ref 3.8–5.2)
RBC # BLD: 2.83 M/UL — LOW (ref 3.8–5.2)
RBC # BLD: 2.83 M/UL — LOW (ref 3.8–5.2)
RBC # FLD: 17.1 % — HIGH (ref 10.3–14.5)
RBC # FLD: 17.1 % — HIGH (ref 10.3–14.5)
RBC BLD AUTO: ABNORMAL
RBC CASTS # UR COMP ASSIST: SIGNIFICANT CHANGE UP /HPF (ref 0–4)
RETICS #: 140.1 K/UL — HIGH (ref 25–125)
RETICS #: 154.6 K/UL — HIGH (ref 25–125)
RETICS/RBC NFR: 5 % — HIGH (ref 0.5–2.5)
RETICS/RBC NFR: 5.5 % — HIGH (ref 0.5–2.5)
SODIUM SERPL-SCNC: 139 MMOL/L — SIGNIFICANT CHANGE UP (ref 135–145)
SP GR SPEC: 1.01 — SIGNIFICANT CHANGE UP (ref 1.01–1.02)
UROBILINOGEN FLD QL: NEGATIVE — SIGNIFICANT CHANGE UP
WBC # BLD: 17.24 K/UL — HIGH (ref 3.8–10.5)
WBC # BLD: 18.02 K/UL — HIGH (ref 3.8–10.5)
WBC # FLD AUTO: 17.24 K/UL — HIGH (ref 3.8–10.5)
WBC # FLD AUTO: 18.02 K/UL — HIGH (ref 3.8–10.5)
WBC UR QL: SIGNIFICANT CHANGE UP /HPF (ref 0–5)

## 2023-07-30 PROCEDURE — 99233 SBSQ HOSP IP/OBS HIGH 50: CPT

## 2023-07-30 RX ADMIN — MAGNESIUM OXIDE 400 MG ORAL TABLET 400 MILLIGRAM(S): 241.3 TABLET ORAL at 21:02

## 2023-07-30 RX ADMIN — Medication 60 MILLIGRAM(S): at 05:10

## 2023-07-30 RX ADMIN — Medication 1 MILLIGRAM(S): at 09:48

## 2023-07-30 RX ADMIN — SODIUM CHLORIDE 100 MILLILITER(S): 9 INJECTION INTRAMUSCULAR; INTRAVENOUS; SUBCUTANEOUS at 12:13

## 2023-07-30 RX ADMIN — Medication 60 MILLIGRAM(S): at 21:02

## 2023-07-30 RX ADMIN — Medication 250 MILLIGRAM(S): at 21:02

## 2023-07-30 RX ADMIN — BUDESONIDE AND FORMOTEROL FUMARATE DIHYDRATE 2 PUFF(S): 160; 4.5 AEROSOL RESPIRATORY (INHALATION) at 08:42

## 2023-07-30 RX ADMIN — ALBUTEROL 2.5 MILLIGRAM(S): 90 AEROSOL, METERED ORAL at 16:43

## 2023-07-30 RX ADMIN — ALBUTEROL 2.5 MILLIGRAM(S): 90 AEROSOL, METERED ORAL at 08:41

## 2023-07-30 RX ADMIN — Medication 40 MILLIGRAM(S): at 09:49

## 2023-07-30 RX ADMIN — POLYETHYLENE GLYCOL 3350 17 GRAM(S): 17 POWDER, FOR SOLUTION ORAL at 09:50

## 2023-07-30 RX ADMIN — PREGABALIN 1000 MICROGRAM(S): 225 CAPSULE ORAL at 09:49

## 2023-07-30 RX ADMIN — Medication 1 TABLET(S): at 09:49

## 2023-07-30 RX ADMIN — POLYETHYLENE GLYCOL 3350 17 GRAM(S): 17 POWDER, FOR SOLUTION ORAL at 21:02

## 2023-07-30 RX ADMIN — BUDESONIDE AND FORMOTEROL FUMARATE DIHYDRATE 2 PUFF(S): 160; 4.5 AEROSOL RESPIRATORY (INHALATION) at 21:08

## 2023-07-30 RX ADMIN — Medication 250 MILLIGRAM(S): at 09:49

## 2023-07-30 RX ADMIN — Medication 60 MILLIGRAM(S): at 13:48

## 2023-07-30 RX ADMIN — Medication 3 MILLIGRAM(S): at 21:02

## 2023-07-30 NOTE — PROGRESS NOTE ADULT - ASSESSMENT
52 year old female PMHx significant for ulcerative colitis presents to the [facility] on 7/19/23  for further evaluation and management of symptoms of progressively worsening generalized weakness, malaise/fatigue, myalgias, subjective fevers (TMax at home 103.7'F), generalized abdominal discomfort and decreased PO intake which began on Friday (7/14).  The patient states that her symptoms progressed despite taking Tylenol and Motrin prn. Of note the patient reportedly tested negative for Influenza and COVID-19. Upon triage the patient met sepsis criteria => Vital signs in triage => BP 86/53, HR 70/min, RR 27/min, SpO2 97% on room air. In the ED CCM was consulted to further evaluate.     # r/o HLH  - case d/w dr villa and results discussed with patient  - continue solumedrol 60 mg q8h  - plan for BM bx in AM  - npo p mn in anticipation of this. will d/w IR.   - criteria met for HLH so far: elevated ferritin, fever (?babesia), anemia and thrombocytopenia (now thrombocytosis) again not clear given initial pres with babesia, elevated IL2 soluble receptor ab.  f/u TG and fibrinogen levels.    - transfuse 1 unit PRBC today     # babesia - resolved. sepsis POA  - Babesia microti - PCR positive , - parasitemia 7.4% --> 2--> 1 % -> undetectable  - completed 10 day course of  Atovaquone 750mg po q12h, . Azithromycin 500mg IVPB daily needs 10 days course     # Acute hypoxic respiratory failure due to atelectasis vs bilateral gram negative lisa Pneumonia vs   - CT chest - mild septal thickening in both lungs - ? edema, vs infection vs intracapillary hemolysis causing this and this d/w pt and dr villa/ray and need for exchange transfusion   - CTA 7/23 - New bilateral lower lobe consolidation,  doppler LE - no DVT      # 1.4 cm right hepatic lesion with transaminitis   - MRCP - likely hemangioma , o/p repeat test   - CT abd - noted  - hepatitis panel neg     # Hematuria - check UA, urine cx.  cr stable     # UC - add probiotics; stable, not on immunosuppressants    # Anxiety ~cont. Clonazepam 0.5mg po qhs prn    #ADHD ~will hold Dextroamphetamine-Amphetamine (takes 10mg po daily), takes Dextroamphetamine-Amphetamine ER 20mg daily prn    VTE PX ~cont. SCDs for now CTA - neg for PE, no DVT     DISPO  - abx, antipyretics , inpatient monitoring until cleared by ID and pulmonary and oncology  POC discussed with family as well

## 2023-07-30 NOTE — PROGRESS NOTE ADULT - SUBJECTIVE AND OBJECTIVE BOX
52 year old female PMHx significant for ulcerative colitis presents to the [facility] on 23  for further evaluation and management of symptoms of progressively worsening generalized weakness, malaise/fatigue, myalgias, subjective fevers (TMax at home 103.7'F), generalized abdominal discomfort and decreased PO intake which began on Friday ().  The patient states that her symptoms progressed despite taking Tylenol and Motrin prn. Of note the patient reportedly tested negative for Influenza and COVID-19. Upon triage the patient met sepsis criteria => Vital signs in triage => BP 86/53, HR 70/min, RR 27/min, SpO2 97% on room air. In the ED CCM was consulted to further evaluate.      - Patient seen and examined at bedside earlier today, + generalized chills, aches, poor po intake, generalized abdominal discomfort    - pt seen and examined in am, + febrile, + muscle aches, denies cp, + cough persist, denies cp, abdominal pain , + lose bm   - no events, still febrile, + cough, dyspnea overnight , denies cp, abdominal pain, eager to go home   - + hypoxia overnight on 4 L now, + cough, + dyspnea, + pleuritic chest pain, denies abdominal pain, plan discussed   - events noted episodes of hypoxemia, + cough, + pleuritic chest pain persist, denies abdominal pain, tolerating po intake, agreeable for blood transfusion   - no cp palps. mild cough, dry. shortness of breath requiring O2 via NC    - still some SOB.  pt seen by ana and agreeable to PRBC transfusion.     - pt tolerated transfusion yesterday . pt eager to go home but explained to her regarding need for bloodwork tomorrow and to speak with dr bell regarding exchange transfusion.    - no overnight events. pt still wtih some SOB. no cp. no fever    - pt with hematuria since yesterday but no increased frequency or dysuria. no fever or chills.  SOb improved. tolerated transfusion yesterday   - hematuria resolving.  + fever.  o2sat on ambulation 87% on RA. no abd pian, cp, n/v/d    ROS:   All 10 systems reviewed and found to be negative with the exception of what has been described above.    PHYSICAL EXAM:    Vital Signs Last 24 Hrs  T(C): 37.8 (2023 08:19), Max: 37.8 (2023 08:19)  T(F): 100 (2023 08:19), Max: 100 (2023 08:19)  HR: 87 (2023 08:19) (60 - 87)  BP: 108/64 (2023 08:19) (96/56 - 108/64)  BP(mean): --  RR: 18 (2023 08:19) (18 - 18)  SpO2: 94% (2023 08:45) (93% - 96%)    Parameters below as of 2023 08:45  Patient On (Oxygen Delivery Method): room air        GENERAL: NAD, able to lie flat in bed  HEAD:  Atraumatic, Normocephalic  EYES: EOMI, PERRLA, normal sclera  ENT: Moist mucous membranes  NECK: Supple, No JVD, no nuchal rigidity  CHEST/LUNG: Clear to auscultation bilaterally; No rales, rhonchi, wheezing, or rubs. Unlabored respirations  HEART: Regular rate and rhythm; No murmurs, rubs, or gallops  ABDOMEN: Bowel sounds present; Soft, Nontender, Nondistended. No hepatomegaly  EXTREMITIES:  no pitting bilaterally  NERVOUS SYSTEM:  Alert & Oriented X3, speech clear. No focal motor or sensory deficits  MSK: FROM all 4 extremities, full and equal strength  SKIN: No rashes or lesions                              8.3    18.02 )-----------( 831      ( 2023 11:18 )             24.4     07-30    139  |  107  |  19  ----------------------------<  131<H>  4.4   |  24  |  0.60    Ca    8.4<L>      2023 07:03    TPro  7.2  /  Alb  2.0<L>  /  TBili  1.4<H>  /  DBili  x   /  AST  123<H>  /  ALT  118<H>  /  AlkPhos  91  07-29        LIVER FUNCTIONS - ( 2023 07:06 )  Alb: 2.0 g/dL / Pro: 7.2 gm/dL / ALK PHOS: 91 U/L / ALT: 118 U/L / AST: 123 U/L / GGT: x           PT/INR - ( 2023 11:18 )   PT: 12.7 sec;   INR: 1.13 ratio         PTT - ( 2023 11:18 )  PTT:24.9 sec  Urinalysis Basic - ( 2023 12:30 )    Color: Yellow / Appearance: Clear / S.010 / pH: x  Gluc: x / Ketone: Negative  / Bili: Negative / Urobili: Negative   Blood: x / Protein: Negative / Nitrite: Negative   Leuk Esterase: Negative / RBC: x / WBC x   Sq Epi: x / Non Sq Epi: x / Bacteria: x          RAD  < from: US Duplex Venous Lower Ext Complete, Bilateral (23 @ 17:20) >  No evidence of deep venous thrombosis in either lower extremity.  < from: CT Angio Chest PE Protocol w/ IV Cont (23 @ 15:05) >  1. New bilateral lower lobe consolidation and additional scattered   consolidative opacities, as described. Recommend plain film follow-up.  2. No evidence of pulmonary embolism.    LABS: All Labs Reviewed:

## 2023-07-31 ENCOUNTER — RESULT REVIEW (OUTPATIENT)
Age: 53
End: 2023-07-31

## 2023-07-31 LAB
APPEARANCE UR: CLEAR — SIGNIFICANT CHANGE UP
BACTERIA # UR AUTO: ABNORMAL
BILIRUB UR-MCNC: NEGATIVE — SIGNIFICANT CHANGE UP
COLOR SPEC: YELLOW — SIGNIFICANT CHANGE UP
CULTURE RESULTS: SIGNIFICANT CHANGE UP
DIFF PNL FLD: ABNORMAL
EPI CELLS # UR: SIGNIFICANT CHANGE UP
GLUCOSE UR QL: NEGATIVE — SIGNIFICANT CHANGE UP
HCT VFR BLD CALC: 27 % — LOW (ref 34.5–45)
HCT VFR BLD CALC: 30.1 % — LOW (ref 34.5–45)
HGB BLD-MCNC: 10 G/DL — LOW (ref 11.5–15.5)
HGB BLD-MCNC: 9 G/DL — LOW (ref 11.5–15.5)
INTERFERON GAMMA, BLOOD: 46.2 PG/ML — HIGH
KETONES UR-MCNC: NEGATIVE — SIGNIFICANT CHANGE UP
LDH SERPL L TO P-CCNC: 1088 U/L — HIGH (ref 84–241)
LEGIONELLA AB SER-ACNC: <0.91 — SIGNIFICANT CHANGE UP (ref 0–0.9)
LEUKOCYTE ESTERASE UR-ACNC: NEGATIVE — SIGNIFICANT CHANGE UP
MCHC RBC-ENTMCNC: 29.8 PG — SIGNIFICANT CHANGE UP (ref 27–34)
MCHC RBC-ENTMCNC: 29.9 PG — SIGNIFICANT CHANGE UP (ref 27–34)
MCHC RBC-ENTMCNC: 33.2 GM/DL — SIGNIFICANT CHANGE UP (ref 32–36)
MCHC RBC-ENTMCNC: 33.3 GM/DL — SIGNIFICANT CHANGE UP (ref 32–36)
MCV RBC AUTO: 89.4 FL — SIGNIFICANT CHANGE UP (ref 80–100)
MCV RBC AUTO: 90.1 FL — SIGNIFICANT CHANGE UP (ref 80–100)
NITRITE UR-MCNC: NEGATIVE — SIGNIFICANT CHANGE UP
PH UR: 7 — SIGNIFICANT CHANGE UP (ref 5–8)
PLATELET # BLD AUTO: 802 K/UL — HIGH (ref 150–400)
PLATELET # BLD AUTO: 920 K/UL — HIGH (ref 150–400)
PROT UR-MCNC: NEGATIVE — SIGNIFICANT CHANGE UP
RBC # BLD: 3.02 M/UL — LOW (ref 3.8–5.2)
RBC # BLD: 3.34 M/UL — LOW (ref 3.8–5.2)
RBC # BLD: 3.34 M/UL — LOW (ref 3.8–5.2)
RBC # FLD: 18 % — HIGH (ref 10.3–14.5)
RBC # FLD: 18.3 % — HIGH (ref 10.3–14.5)
RBC CASTS # UR COMP ASSIST: ABNORMAL /HPF (ref 0–4)
RETICS #: 203.4 K/UL — HIGH (ref 25–125)
RETICS/RBC NFR: 6.1 % — HIGH (ref 0.5–2.5)
SP GR SPEC: 1.01 — SIGNIFICANT CHANGE UP (ref 1.01–1.02)
SPECIMEN SOURCE: SIGNIFICANT CHANGE UP
TRIGL SERPL-MCNC: 299 MG/DL — HIGH
UROBILINOGEN FLD QL: NEGATIVE — SIGNIFICANT CHANGE UP
WBC # BLD: 12.93 K/UL — HIGH (ref 3.8–10.5)
WBC # BLD: 14.02 K/UL — HIGH (ref 3.8–10.5)
WBC # FLD AUTO: 12.93 K/UL — HIGH (ref 3.8–10.5)
WBC # FLD AUTO: 14.02 K/UL — HIGH (ref 3.8–10.5)
WBC UR QL: SIGNIFICANT CHANGE UP /HPF (ref 0–5)

## 2023-07-31 PROCEDURE — 77012 CT SCAN FOR NEEDLE BIOPSY: CPT | Mod: 26

## 2023-07-31 PROCEDURE — 88305 TISSUE EXAM BY PATHOLOGIST: CPT | Mod: 26

## 2023-07-31 PROCEDURE — 85097 BONE MARROW INTERPRETATION: CPT

## 2023-07-31 PROCEDURE — 99233 SBSQ HOSP IP/OBS HIGH 50: CPT

## 2023-07-31 PROCEDURE — 88313 SPECIAL STAINS GROUP 2: CPT | Mod: 26

## 2023-07-31 PROCEDURE — 88341 IMHCHEM/IMCYTCHM EA ADD ANTB: CPT | Mod: 26

## 2023-07-31 PROCEDURE — 38222 DX BONE MARROW BX & ASPIR: CPT | Mod: RT

## 2023-07-31 PROCEDURE — 88342 IMHCHEM/IMCYTCHM 1ST ANTB: CPT | Mod: 26

## 2023-07-31 RX ORDER — PANTOPRAZOLE SODIUM 20 MG/1
40 TABLET, DELAYED RELEASE ORAL
Refills: 0 | Status: DISCONTINUED | OUTPATIENT
Start: 2023-07-31 | End: 2023-08-01

## 2023-07-31 RX ORDER — KETOROLAC TROMETHAMINE 30 MG/ML
30 SYRINGE (ML) INJECTION ONCE
Refills: 0 | Status: DISCONTINUED | OUTPATIENT
Start: 2023-07-31 | End: 2023-07-31

## 2023-07-31 RX ORDER — OXYCODONE AND ACETAMINOPHEN 5; 325 MG/1; MG/1
2 TABLET ORAL ONCE
Refills: 0 | Status: DISCONTINUED | OUTPATIENT
Start: 2023-07-31 | End: 2023-07-31

## 2023-07-31 RX ORDER — KETOROLAC TROMETHAMINE 30 MG/ML
15 SYRINGE (ML) INJECTION ONCE
Refills: 0 | Status: DISCONTINUED | OUTPATIENT
Start: 2023-07-31 | End: 2023-07-31

## 2023-07-31 RX ADMIN — PREGABALIN 1000 MICROGRAM(S): 225 CAPSULE ORAL at 10:21

## 2023-07-31 RX ADMIN — ALBUTEROL 2.5 MILLIGRAM(S): 90 AEROSOL, METERED ORAL at 07:23

## 2023-07-31 RX ADMIN — Medication 60 MILLIGRAM(S): at 21:36

## 2023-07-31 RX ADMIN — SODIUM CHLORIDE 100 MILLILITER(S): 9 INJECTION INTRAMUSCULAR; INTRAVENOUS; SUBCUTANEOUS at 01:59

## 2023-07-31 RX ADMIN — Medication 1 TABLET(S): at 10:21

## 2023-07-31 RX ADMIN — Medication 60 MILLIGRAM(S): at 14:14

## 2023-07-31 RX ADMIN — Medication 3 MILLIGRAM(S): at 21:39

## 2023-07-31 RX ADMIN — MAGNESIUM OXIDE 400 MG ORAL TABLET 400 MILLIGRAM(S): 241.3 TABLET ORAL at 21:39

## 2023-07-31 RX ADMIN — POLYETHYLENE GLYCOL 3350 17 GRAM(S): 17 POWDER, FOR SOLUTION ORAL at 21:37

## 2023-07-31 RX ADMIN — Medication 250 MILLIGRAM(S): at 10:21

## 2023-07-31 RX ADMIN — Medication 60 MILLIGRAM(S): at 06:23

## 2023-07-31 RX ADMIN — BUDESONIDE AND FORMOTEROL FUMARATE DIHYDRATE 2 PUFF(S): 160; 4.5 AEROSOL RESPIRATORY (INHALATION) at 07:23

## 2023-07-31 RX ADMIN — Medication 250 MILLIGRAM(S): at 21:38

## 2023-07-31 RX ADMIN — POLYETHYLENE GLYCOL 3350 17 GRAM(S): 17 POWDER, FOR SOLUTION ORAL at 10:22

## 2023-07-31 RX ADMIN — Medication 1 MILLIGRAM(S): at 10:22

## 2023-07-31 RX ADMIN — BUDESONIDE AND FORMOTEROL FUMARATE DIHYDRATE 2 PUFF(S): 160; 4.5 AEROSOL RESPIRATORY (INHALATION) at 20:15

## 2023-07-31 NOTE — PROGRESS NOTE ADULT - SUBJECTIVE AND OBJECTIVE BOX
52 year old female PMHx significant for ulcerative colitis presents to the [facility] on 23  for further evaluation and management of symptoms of progressively worsening generalized weakness, malaise/fatigue, myalgias, subjective fevers (TMax at home 103.7'F), generalized abdominal discomfort and decreased PO intake which began on Friday ().  The patient states that her symptoms progressed despite taking Tylenol and Motrin prn. Of note the patient reportedly tested negative for Influenza and COVID-19. Upon triage the patient met sepsis criteria => Vital signs in triage => BP 86/53, HR 70/min, RR 27/min, SpO2 97% on room air. In the ED CCM was consulted to further evaluate.      - Patient seen and examined at bedside earlier today, + generalized chills, aches, poor po intake, generalized abdominal discomfort    - pt seen and examined in am, + febrile, + muscle aches, denies cp, + cough persist, denies cp, abdominal pain , + lose bm   - no events, still febrile, + cough, dyspnea overnight , denies cp, abdominal pain, eager to go home   - + hypoxia overnight on 4 L now, + cough, + dyspnea, + pleuritic chest pain, denies abdominal pain, plan discussed   - events noted episodes of hypoxemia, + cough, + pleuritic chest pain persist, denies abdominal pain, tolerating po intake, agreeable for blood transfusion   - no cp palps. mild cough, dry. shortness of breath requiring O2 via NC    - still some SOB.  pt seen by ana and agreeable to PRBC transfusion.     - pt tolerated transfusion yesterday . pt eager to go home but explained to her regarding need for bloodwork tomorrow and to speak with dr bell regarding exchange transfusion.    - no overnight events. pt still wtih some SOB. no cp. no fever    - pt with hematuria since yesterday but no increased frequency or dysuria. no fever or chills.  SOb improved. tolerated transfusion yesterday   - hematuria resolving.  + fever.  o2sat on ambulation 87% on RA. no abd pian, cp, n/v/d   - hematuria resolved. no fever or chills.  o2 sat 96% on RA with ambulation.  no cp, n/v/d    ROS:   All 10 systems reviewed and found to be negative with the exception of what has been described above.    PHYSICAL EXAM:    Vital Signs Last 24 Hrs  T(C): 36.8 (2023 07:13), Max: 37 (2023 14:38)  T(F): 98.2 (2023 07:13), Max: 98.6 (2023 14:38)  HR: 78 (2023 07:27) (61 - 83)  BP: 115/68 (2023 07:13) (98/52 - 115/68)  BP(mean): 70 (2023 20:57) (70 - 70)  RR: 18 (2023 07:13) (18 - 18)  SpO2: 95% (2023 07:13) (95% - 96%)    Parameters below as of 2023 07:13  Patient On (Oxygen Delivery Method): room air      GENERAL: NAD, able to lie flat in bed  HEAD:  Atraumatic, Normocephalic  EYES: EOMI, PERRLA, normal sclera  ENT: Moist mucous membranes  NECK: Supple, No JVD, no nuchal rigidity  CHEST/LUNG: Clear to auscultation bilaterally; No rales, rhonchi, wheezing, or rubs. Unlabored respirations  HEART: Regular rate and rhythm; No murmurs, rubs, or gallops  ABDOMEN: Bowel sounds present; Soft, Nontender, Nondistended. No hepatomegaly  EXTREMITIES:  no pitting bilaterally  NERVOUS SYSTEM:  Alert & Oriented X3, speech clear. No focal motor or sensory deficits  MSK: FROM all 4 extremities, full and equal strength  SKIN: No rashes or lesions                                9.0    14.02 )-----------( 802      ( 2023 06:31 )             27.0     07-30    139  |  107  |  19  ----------------------------<  131<H>  4.4   |  24  |  0.60    Ca    8.4<L>      2023 07:03        PT/INR - ( 2023 11:18 )   PT: 12.7 sec;   INR: 1.13 ratio         PTT - ( 2023 11:18 )  PTT:24.9 sec  Urinalysis Basic - ( 2023 12:30 )    Color: Yellow / Appearance: Clear / S.010 / pH: x  Gluc: x / Ketone: Negative  / Bili: Negative / Urobili: Negative   Blood: x / Protein: Negative / Nitrite: Negative   Leuk Esterase: Negative / RBC: 0-2 /HPF / WBC 3-5 /HPF   Sq Epi: x / Non Sq Epi: x / Bacteria: Few          RAD  < from: US Duplex Venous Lower Ext Complete, Bilateral (23 @ 17:20) >  No evidence of deep venous thrombosis in either lower extremity.  < from: CT Angio Chest PE Protocol w/ IV Cont (23 @ 15:05) >  1. New bilateral lower lobe consolidation and additional scattered   consolidative opacities, as described. Recommend plain film follow-up.  2. No evidence of pulmonary embolism.    LABS: All Labs Reviewed:

## 2023-07-31 NOTE — PROGRESS NOTE ADULT - ASSESSMENT
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  on smear  Anemia / Hemolysis / low reticulocyte count  ( corrected ) : R/O complicating HLH  ( Hemophagocytic lymphohistiocytosis )  Elevated / rising platelets speaks against HLH,  today no longer rising     drug induced hemolysis   Hematuria :  appears resolved,   possible drug-induced hemolysis due to Mepron,,  which has now been stopped    PLAN   continue steroids   bone marrow biopsy and aspiration this afternoon   we will review  smear to look for hemophagocytosis   if negative makes HLH less likely   we will consider  stopping steroids if negative       G6PD pending    unlikely this will  diagnostic   for the current admission   HLA pending,   unclear when this will be back and may  not hold back discharge     discharge once bone marrow reviewed and patient stable

## 2023-07-31 NOTE — PROGRESS NOTE ADULT - SUBJECTIVE AND OBJECTIVE BOX
Patient is a 52y old  Female who presents with a chief complaint of Generalized Malaise, Fatigue, Myalgias, Fever, Abdominal Pain, Nausea, decreased PO intake. (22 Jul 2023 14:46)      HPI:  52 year old female PMHx significant for ulcerative colitis presents to the [facility] for further evaluation and management of symptoms of progressively worsening generalized weakness, malaise/fatigue, myalgias, subjective fevers (TMax at home 103.7'F), generalized abdominal discomfort and decreased PO intake which began on Friday (7/14).  The patient states that her symptoms progressed despite taking Tylenol and Motrin prn. Of note the patient reportedly tested negative for Influenza and COVID-19. Upon triage the patient met sepsis criteria => Vital signs in triage => BP 86/53, HR 70/min, RR 27/min, SpO2 97% on room air. In the ED CCM was consulted to further evaluate.   Found to have Babesiosis   CT Chest revealed findings concerning of mild pulmonary edema (likely non cardiogenic)/parasite related     7/31  Dyspnea improving after lasix  XR shows improvement  In process of undergoing bone marrow biopsy today  6MWT performed this morning, there was no evidence of ambulatory desaturations     MEDICATIONS  (STANDING):  albuterol    0.083% 2.5 milliGRAM(s) Nebulizer every 8 hours  albuterol    90 MICROgram(s) HFA Inhaler 1 Puff(s) Inhalation every 4 hours  budesonide 160 MICROgram(s)/formoterol 4.5 MICROgram(s) Inhaler 2 Puff(s) Inhalation two times a day  cyanocobalamin 1000 MICROGram(s) Oral daily  folic acid 1 milliGRAM(s) Oral daily  magnesium oxide 400 milliGRAM(s) Oral at bedtime  methylPREDNISolone sodium succinate Injectable 60 milliGRAM(s) IV Push every 8 hours  multivitamin 1 Tablet(s) Oral daily  polyethylene glycol 3350 17 Gram(s) Oral two times a day  saccharomyces boulardii 250 milliGRAM(s) Oral two times a day  sodium chloride 0.9%. 1000 milliLiter(s) (100 mL/Hr) IV Continuous <Continuous>    MEDICATIONS  (PRN):  acetaminophen     Tablet .. 650 milliGRAM(s) Oral every 6 hours PRN Temp greater or equal to 38C (100.4F), Mild Pain (1 - 3)  aluminum hydroxide/magnesium hydroxide/simethicone Suspension 30 milliLiter(s) Oral every 4 hours PRN Dyspepsia  benzocaine/menthol Lozenge 1 Lozenge Oral four times a day PRN Sore Throat  ibuprofen  Tablet. 400 milliGRAM(s) Oral every 6 hours PRN Temp greater or equal to 38.5C (101.3F)  lidocaine   4% Patch 1 Patch Transdermal daily PRN Pain  melatonin 3 milliGRAM(s) Oral at bedtime PRN Insomnia  ondansetron Injectable 4 milliGRAM(s) IV Push every 8 hours PRN Nausea and/or Vomiting      Vital Signs Last 24 Hrs  T(C): 36.9 (30 Jul 2023 20:57), Max: 37.8 (30 Jul 2023 08:19)  T(F): 98.4 (30 Jul 2023 20:57), Max: 100 (30 Jul 2023 08:19)  HR: 78 (31 Jul 2023 07:27) (64 - 87)  BP: 102/56 (30 Jul 2023 20:57) (98/52 - 108/64)  BP(mean): 70 (30 Jul 2023 20:57) (70 - 70)  RR: 18 (30 Jul 2023 20:57) (18 - 18)  SpO2: 95% (30 Jul 2023 20:57) (94% - 96%)    Parameters below as of 30 Jul 2023 20:57  Patient On (Oxygen Delivery Method): room air        I&O's Summary    PHYSICAL EXAM  General Appearance: cooperative, no acute distress,   HEENT: PERRL, conjunctiva clear, EOM's intact, non injected pharynx, no exudate, TM   normal  Neck: Supple, , no adenopathy, thyroid: not enlarged, no carotid bruit or JVD  Back: Symmetric, no  tenderness,no soft tissue tenderness  Lungs: breath sounds have improved, chest expansion is less limited, no longer has cough with exhalation  Heart: Regular rate and rhythm, S1, S2 normal, no murmur, rub or gallop  Abdomen: Soft, non-tender, bowel sounds active , no hepatosplenomegaly  Extremities: no cyanosis or edema, no joint swelling  Skin: Skin color, texture normal, no rashes   Neurologic: Alert and oriented X3 , cranial nerves intact, sensory and motor normal,    ECG:    LABS:                                     8.2    6.88  )-----------( 530      ( 26 Jul 2023 07:02 )             23.8   07-26    136  |  103  |  7   ----------------------------<  100<H>  4.0   |  29  |  0.64    Ca    8.1<L>      26 Jul 2023 07:02  Phos  3.5     07-26  Mg     2.2     07-26          RADIOLOGY & ADDITIONAL STUDIES:

## 2023-07-31 NOTE — PROGRESS NOTE ADULT - ASSESSMENT
52 year old female PMHx significant for ulcerative colitis presents to the [facility] for further evaluation and management of symptoms of progressively worsening generalized weakness, malaise/fatigue, myalgias, subjective fevers  Found to have Babesiosis   CTA performed 7/23 due to increased O2 requirements- Findings represent bilateral consolidations with mild pleural effusions- consolidations have increased since 7/20.   On examination, she has diminished breath sounds at the bases and a cough with exhalation still  Started Symbicort temporarily  Completed Atovaquone/Azithromycin as well as  Ceftriaxone for pneumonia   ABG reviewed - 7.51/34/61  Dopplers negative  Transfused 1 U PRBC on 7/30  Will continue to monitor - she is not in acute respiratory distress   Reassess for ambulatory desaturations   Reviewed repeat XR, BNP normal and recommend to continue Lasix 40mg to assist with diuresis.   Reviewed echocardiogram- no findings of pulmonary hypertension but increased Pulmonic valve velocity noted.  Reviewed hemolysis panel, hematology to perform work up for HLH which could be contributing to the hemolysis vs drug induced hemolysis from Mepron  6MWT performed today without evidence of ambulatory desaturations and HR was normal. Total walk distance 324m -   Can come off ambulatory O2 based on walk test results   If there are findings of desaturation again, will order CT Chest and echo to evaluate pulmonary vasculature

## 2023-07-31 NOTE — PROGRESS NOTE ADULT - ASSESSMENT
52 year old female PMHx significant for ulcerative colitis presents to the [facility] on 7/19/23  for further evaluation and management of symptoms of progressively worsening generalized weakness, malaise/fatigue, myalgias, subjective fevers (TMax at home 103.7'F), generalized abdominal discomfort and decreased PO intake which began on Friday (7/14).  The patient states that her symptoms progressed despite taking Tylenol and Motrin prn. Of note the patient reportedly tested negative for Influenza and COVID-19. Upon triage the patient met sepsis criteria => Vital signs in triage => BP 86/53, HR 70/min, RR 27/min, SpO2 97% on room air. In the ED CCM was consulted to further evaluate.     # r/o HLH  - case d/w dr villa and results discussed with patient  - continue solumedrol 60 mg q8h  - plan for BM bx today  - npo in anticipation of this. will d/w IR and she is add on case for today  - criteria met for HLH so far: elevated ferritin, fever (?babesia), anemia and thrombocytopenia (now thrombocytosis) again not clear given initial pres with babesia, elevated IL2 soluble receptor ab.  f/u TG.  normal fibrinogen levels.    - transfused 1 unit prbc 7/31 - total of 4 units    # babesia - resolved. sepsis POA  - Babesia microti - PCR positive , - parasitemia 7.4% --> 2--> 1 % -> undetectable  - completed 10 day course of  Atovaquone 750mg po q12h, . Azithromycin 500mg IVPB daily needs 10 days course     # Acute hypoxic respiratory failure due to atelectasis vs bilateral gram negative lisa Pneumonia vs   - CT chest - mild septal thickening in both lungs - ? edema, vs infection vs intracapillary hemolysis causing this and this d/w pt and dr villa/ray and need for exchange transfusion   - CTA 7/23 - New bilateral lower lobe consolidation,  doppler LE - no DVT      # 1.4 cm right hepatic lesion with transaminitis   - MRCP - likely hemangioma , o/p repeat test   - CT abd - noted  - hepatitis panel neg     # Hematuria - check UA, urine cx.  cr stable     # UC - add probiotics; stable, not on immunosuppressants    # Anxiety ~cont. Clonazepam 0.5mg po qhs prn    #ADHD ~will hold Dextroamphetamine-Amphetamine (takes 10mg po daily), takes Dextroamphetamine-Amphetamine ER 20mg daily prn    VTE PX ~cont. SCDs for now CTA - neg for PE, no DVT     DISPO  - abx, antipyretics , inpatient monitoring until cleared by ID and pulmonary and oncology  POC discussed with family as well

## 2023-07-31 NOTE — PROGRESS NOTE ADULT - SUBJECTIVE AND OBJECTIVE BOX
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 , lung infiltrates / consolidations, resolved parasitemia yet ongoing hemolysis , tepid reticulocyte response ; Elevated ferritin / LFT s : suggested  HLH   syndrome ; started IV steroids yesterday ; clinically stable today   Triglycerides / sIL2 R and Gamma IFN pending  Marrow tentatively  Monday  Has progressive thrombocytosis   Ongoing hemolysis       Had blood in urine / gross hematuria  No dysuria / frequency / urgency   No back pains    Breathing stable/ no worsening    Currently on steroids     had 1 single transient low-grade fever, patient unaware     breathing better   ambulated in malagon without desaturation this morning        PE   well-developed well-nourished 52-year-old female alert oriented comfortable   HEENT normocephalic anicteric   neck supple no palpable lymphadenopathy   lungs some decreased air movement crackles left base clears with cough   heart S1-S2   abdomen soft nontender   extremities without edema     WBC 14   Hgb 9, HCT 27, MCV 89, platelet 802     triglyceride 299

## 2023-08-01 ENCOUNTER — TRANSCRIPTION ENCOUNTER (OUTPATIENT)
Age: 53
End: 2023-08-01

## 2023-08-01 VITALS
OXYGEN SATURATION: 96 % | DIASTOLIC BLOOD PRESSURE: 72 MMHG | SYSTOLIC BLOOD PRESSURE: 107 MMHG | RESPIRATION RATE: 19 BRPM | TEMPERATURE: 98 F | HEART RATE: 53 BPM

## 2023-08-01 LAB
ANION GAP SERPL CALC-SCNC: 6 MMOL/L — SIGNIFICANT CHANGE UP (ref 5–17)
BUN SERPL-MCNC: 19 MG/DL — SIGNIFICANT CHANGE UP (ref 7–23)
CALCIUM SERPL-MCNC: 8.5 MG/DL — SIGNIFICANT CHANGE UP (ref 8.5–10.1)
CHLORIDE SERPL-SCNC: 106 MMOL/L — SIGNIFICANT CHANGE UP (ref 96–108)
CO2 SERPL-SCNC: 26 MMOL/L — SIGNIFICANT CHANGE UP (ref 22–31)
CREAT SERPL-MCNC: 0.53 MG/DL — SIGNIFICANT CHANGE UP (ref 0.5–1.3)
EGFR: 111 ML/MIN/1.73M2 — SIGNIFICANT CHANGE UP
GLUCOSE SERPL-MCNC: 126 MG/DL — HIGH (ref 70–99)
HAPTOGLOB SERPL-MCNC: <20 MG/DL — LOW (ref 34–200)
HCT VFR BLD CALC: 28.7 % — LOW (ref 34.5–45)
HGB BLD-MCNC: 9.5 G/DL — LOW (ref 11.5–15.5)
MCHC RBC-ENTMCNC: 30.2 PG — SIGNIFICANT CHANGE UP (ref 27–34)
MCHC RBC-ENTMCNC: 33.1 GM/DL — SIGNIFICANT CHANGE UP (ref 32–36)
MCV RBC AUTO: 91.1 FL — SIGNIFICANT CHANGE UP (ref 80–100)
PLATELET # BLD AUTO: 858 K/UL — HIGH (ref 150–400)
POTASSIUM SERPL-MCNC: 4.2 MMOL/L — SIGNIFICANT CHANGE UP (ref 3.5–5.3)
POTASSIUM SERPL-SCNC: 4.2 MMOL/L — SIGNIFICANT CHANGE UP (ref 3.5–5.3)
RBC # BLD: 3.15 M/UL — LOW (ref 3.8–5.2)
RBC # FLD: 18.2 % — HIGH (ref 10.3–14.5)
SODIUM SERPL-SCNC: 138 MMOL/L — SIGNIFICANT CHANGE UP (ref 135–145)
WBC # BLD: 11.47 K/UL — HIGH (ref 3.8–10.5)
WBC # FLD AUTO: 11.47 K/UL — HIGH (ref 3.8–10.5)

## 2023-08-01 PROCEDURE — 99239 HOSP IP/OBS DSCHRG MGMT >30: CPT

## 2023-08-01 RX ORDER — ALBUTEROL 90 UG/1
1 AEROSOL, METERED ORAL
Qty: 1 | Refills: 0
Start: 2023-08-01

## 2023-08-01 RX ORDER — PANTOPRAZOLE SODIUM 20 MG/1
1 TABLET, DELAYED RELEASE ORAL
Qty: 15 | Refills: 0
Start: 2023-08-01 | End: 2023-08-15

## 2023-08-01 RX ORDER — FOLIC ACID 0.8 MG
1 TABLET ORAL
Qty: 30 | Refills: 0
Start: 2023-08-01 | End: 2023-08-30

## 2023-08-01 RX ADMIN — Medication 1 TABLET(S): at 09:20

## 2023-08-01 RX ADMIN — Medication 60 MILLIGRAM(S): at 05:20

## 2023-08-01 RX ADMIN — ALBUTEROL 2.5 MILLIGRAM(S): 90 AEROSOL, METERED ORAL at 08:39

## 2023-08-01 RX ADMIN — POLYETHYLENE GLYCOL 3350 17 GRAM(S): 17 POWDER, FOR SOLUTION ORAL at 09:20

## 2023-08-01 RX ADMIN — PREGABALIN 1000 MICROGRAM(S): 225 CAPSULE ORAL at 09:19

## 2023-08-01 RX ADMIN — Medication 1 MILLIGRAM(S): at 09:19

## 2023-08-01 RX ADMIN — BUDESONIDE AND FORMOTEROL FUMARATE DIHYDRATE 2 PUFF(S): 160; 4.5 AEROSOL RESPIRATORY (INHALATION) at 08:41

## 2023-08-01 RX ADMIN — PANTOPRAZOLE SODIUM 40 MILLIGRAM(S): 20 TABLET, DELAYED RELEASE ORAL at 05:20

## 2023-08-01 RX ADMIN — Medication 250 MILLIGRAM(S): at 09:19

## 2023-08-01 NOTE — PROGRESS NOTE ADULT - PROVIDER SPECIALTY LIST ADULT
Hospitalist
Pulmonology
Heme/Onc
Hospitalist
Pulmonology
Heme/Onc
Hospitalist
Infectious Disease
Pulmonology
Heme/Onc
Heme/Onc
Hospitalist
Hospitalist
Infectious Disease
Infectious Disease

## 2023-08-01 NOTE — PROGRESS NOTE ADULT - ASSESSMENT
52 year old female PMHx significant for ulcerative colitis presents to the [facility] for further evaluation and management of symptoms of progressively worsening generalized weakness, malaise/fatigue, myalgias, subjective fevers  Found to have Babesiosis   CTA performed 7/23 due to increased O2 requirements- Findings represent bilateral consolidations with mild pleural effusions- consolidations have increased since 7/20.   On examination, she has diminished breath sounds at the bases and a cough with exhalation still  Started Symbicort temporarily  Completed Atovaquone/Azithromycin as well as  Ceftriaxone for pneumonia   ABG reviewed - 7.51/34/61  Dopplers negative  Transfused 1 U PRBC on 7/30  Will continue to monitor - she is not in acute respiratory distress   Reassess for ambulatory desaturations   Reviewed repeat XR, BNP normal and recommend to continue Lasix 40mg to assist with diuresis.   Reviewed echocardiogram- no findings of pulmonary hypertension but increased Pulmonic valve velocity noted.  Reviewed hemolysis panel, hematology to perform work up for HLH which could be contributing to the hemolysis vs drug induced hemolysis from Mepron  Follow up bone marrow biopsy results   6MWT performed without evidence of ambulatory desaturations and HR was normal. Total walk distance 324m -   Now off resting and ambulatory O2  If there are findings of desaturation again, will order CT Chest and echo to evaluate pulmonary vasculature   But if pulmonary status stays the course, will follow up as an outpatient

## 2023-08-01 NOTE — PROGRESS NOTE ADULT - REASON FOR ADMISSION
Generalized Malaise, Fatigue, Myalgias, Fever, Abdominal Pain, Nausea, decreased PO intake.

## 2023-08-01 NOTE — DISCHARGE NOTE PROVIDER - CARE PROVIDER_API CALL
Romina Ruiz  Family Medicine  [address] [place] [address]  Phone: [phone]  Fax: [phone]  Follow Up Time:     Tony Uriarte  Medical Oncology  [address] [place] [address], [address] [place] [address]  Phone: [phone]  Fax: [phone]  Follow Up Time:

## 2023-08-01 NOTE — DISCHARGE NOTE PROVIDER - PROVIDER TOKENS
SUBJECTIVE:  Sherrie Lala is a 60 year old female who presents for Liver Transplant evaluation.  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 Known Liver mets had XRT and chemo.  Active with no cardiac symptoms.  No significant cardiac risk factors.    Patient Active Problem List    Diagnosis Date Noted     Gastric ulcer 03/06/2018     Priority: Medium     Osteoporosis 03/06/2018     Priority: Medium     Bleeding esophageal varices (H) 03/06/2018     Priority: Medium    .  Current Outpatient Prescriptions   Medication Sig     calcium carbonate 500 MG CHEW Take 500 mg by mouth 2 times daily     cholecalciferol (VITAMIN D3) 1000 UNIT tablet Take 1 tablet (1,000 Units) by mouth daily     OMEPRAZOLE PO Take 20 mg by mouth 2 times daily (before meals)     RifAXIMin (XIFAXAN PO) Take 550 mg by mouth 2 times daily     CIPROFLOXACIN PO Take 750 mg by mouth once a week     SPIRONOLACTONE PO Take 100 mg by mouth daily     FUROSEMIDE PO Take 80 mg by mouth daily     RIFAMPIN PO Take 300 mg by mouth daily     GABAPENTIN PO Take 300 mg by mouth At Bedtime     ZOLPIDEM TARTRATE PO Take 10 mg by mouth nightly as needed for sleep     PRAMIPEXOLE DIHYDROCHLORIDE PO Take 0.5 mg by mouth daily     fluticasone (FLOVENT HFA) 220 MCG/ACT Inhaler Inhale 1 puff into the lungs 2 times daily     albuterol (PROAIR HFA/PROVENTIL HFA/VENTOLIN HFA) 108 (90 BASE) MCG/ACT Inhaler Inhale 2 puffs into the lungs every 6 hours     No current facility-administered medications for this visit.      Past Medical History:   Diagnosis Date     Asthma      Cholangiocarcinoma (H) 06/2014     Esophageal varices with hemorrhage (H)      Gastric ulcer      Lung metastases (H) 08/2014     Past Surgical History:   Procedure Laterality Date     CHOLECYSTECTOMY       Allergies   Allergen Reactions     Blood Transfusion Related (Informational Only) Other (See Comments)     Patient has a history of a clinically significant antibody  "against RBC antigens.  A delay in compatible RBCs may occur.     Social History     Social History     Marital status:      Spouse name: N/A     Number of children: N/A     Years of education: N/A     Occupational History     Not on file.     Social History Main Topics     Smoking status: Never Smoker     Smokeless tobacco: Never Used     Alcohol use No      Comment: occ      Drug use: No     Sexual activity: Not on file     Other Topics Concern     Not on file     Social History Narrative     No family history on file.       REVIEW OF SYSTEMS:  General: negative, fever, chills, night sweats  Skin: negative, acne, rash and scaling  Eyes: negative, double vision, eye pain and photophobia  Ears/Nose/Throat: negative, nasal congestion and purulent rhinorrhea  Respiratory: No dyspnea on exertion, No cough, No hemoptysis and negative  Cardiovascular: negative, palpitations, tachycardia, irregular heart beat, chest pain, exertional chest pain or pressure, paroxysmal nocturnal dyspnea, dyspnea on exertion and orthopnea  Gastrointestinal: negative, dysphagia, nausea and vomiting  Genitourinary: negative, nocturia, dysuria, frequency and urgency  Musculoskeletal: negative, fracture, back pain and neck pain  Neurologic: negative, headaches, syncope, stroke, seizures and paralysis  Psychiatric: negative, nervous breakdown, thoughts of self-harm and thoughts of hurting someone else  Hematologic/Lymphatic/Immunologic: negative, bleeding disorder, chills and fever  Endocrine: negative, cold intolerance, heat intolerance and hot flashes       OBJECTIVE:  Blood pressure 108/68, pulse 85, height 1.6 m (5' 3\"), weight 55.8 kg (123 lb), SpO2 96 %.  General Appearance: alert, active and no distress  Head: Normocephalic. No masses, lesions, tenderness or abnormalities  Eyes: conjuctiva clear, PERRL, EOM intact  Ears: External ears normal. Canals clear. TM's normal.  Nose: Nares normal  Mouth: normal  Neck: Supple, no cervical " adenopathy, no thyromegaly  Lungs: clear to auscultation  Cardiac: regular rate and rhythm, normal S1 and S2, no murmur  Abdomen: Soft, nontender.  Normal bowel sounds.  No hepatosplenomegaly or abnormal masses  Extremities: no peripheral edema, peripheral pulses normal  Musculoskeletal: negative  Neurological: Cranial nerves 2-12 intact, motor strength intact, reflexes normal, Romberg negative       ASSESSMENT/PLAN:  Patient with ESLD due to Budd Chiary syndrome here for Liver Tx evaluation.  Prior cholangio carcinoma s/p resection. Lung mets. Chemo/XRT.  Active with no cardiac complaints.  No CAD risk factors.  Reviewed stress echo. Normal at target heartrate. Normal PA pressure of 21mmGf+RAP.    Patient may proceed with transplant.  Per orders.   Return to Clinic PRN.   PROVIDER:[TOKEN:[[id number]]],PROVIDER:[TOKEN:[[id number]]]

## 2023-08-01 NOTE — DISCHARGE NOTE NURSING/CASE MANAGEMENT/SOCIAL WORK - PATIENT PORTAL LINK FT
You can access the FollowMyHealth Patient Portal offered by [facility] by registering at the following website: [email or web address]. By joining [facility]’s FollowMyHealth portal, you will also be able to view your health information using other applications (apps) compatible with our system.

## 2023-08-01 NOTE — DISCHARGE NOTE PROVIDER - NSDCCPCAREPLAN_GEN_ALL_CORE_FT
PRINCIPAL DISCHARGE DIAGNOSIS  Diagnosis: Acute sepsis  Assessment and Plan of Treatment: you had babesiosis for which you have completed treatment.  you have workup ongoing for HLH and G6PD defeciency.  please follow up with dr villa on friday for close follow up.    return to ER if fever or chills.      SECONDARY DISCHARGE DIAGNOSES  Diagnosis: Tick borne fever  Assessment and Plan of Treatment:

## 2023-08-01 NOTE — PROGRESS NOTE ADULT - SUBJECTIVE AND OBJECTIVE BOX
INTERVAL HISTORY:    REVIEW OF SYSTEMS:      Allergies    No Known Allergies    Intolerances        MEDICATIONS  (STANDING):  albuterol    0.083% 2.5 milliGRAM(s) Nebulizer every 8 hours  albuterol    90 MICROgram(s) HFA Inhaler 1 Puff(s) Inhalation every 4 hours  budesonide 160 MICROgram(s)/formoterol 4.5 MICROgram(s) Inhaler 2 Puff(s) Inhalation two times a day  cyanocobalamin 1000 MICROGram(s) Oral daily  folic acid 1 milliGRAM(s) Oral daily  magnesium oxide 400 milliGRAM(s) Oral at bedtime  methylPREDNISolone sodium succinate Injectable 60 milliGRAM(s) IV Push every 8 hours  multivitamin 1 Tablet(s) Oral daily  pantoprazole    Tablet 40 milliGRAM(s) Oral before breakfast  polyethylene glycol 3350 17 Gram(s) Oral two times a day  saccharomyces boulardii 250 milliGRAM(s) Oral two times a day    MEDICATIONS  (PRN):  acetaminophen     Tablet .. 650 milliGRAM(s) Oral every 6 hours PRN Mild Pain (1 - 3)  aluminum hydroxide/magnesium hydroxide/simethicone Suspension 30 milliLiter(s) Oral every 4 hours PRN Dyspepsia  benzocaine/menthol Lozenge 1 Lozenge Oral four times a day PRN Sore Throat  ibuprofen  Tablet. 400 milliGRAM(s) Oral every 6 hours PRN Temp greater or equal to 38.5C (101.3F)  lidocaine   4% Patch 1 Patch Transdermal daily PRN Pain  melatonin 3 milliGRAM(s) Oral at bedtime PRN Insomnia  ondansetron Injectable 4 milliGRAM(s) IV Push every 8 hours PRN Nausea and/or Vomiting      Vital Signs Last 24 Hrs  T(C): 36.9 (01 Aug 2023 07:07), Max: 36.9 (31 Jul 2023 21:40)  T(F): 98.4 (01 Aug 2023 07:07), Max: 98.4 (31 Jul 2023 21:40)  HR: 53 (01 Aug 2023 07:07) (52 - 74)  BP: 107/72 (01 Aug 2023 07:07) (103/65 - 114/73)  BP(mean): --  RR: 19 (01 Aug 2023 07:07) (12 - 19)  SpO2: 96% (01 Aug 2023 07:07) (95% - 98%)    Parameters below as of 01 Aug 2023 07:07  Patient On (Oxygen Delivery Method): room air        PHYSICAL EXAM:    GENERAL: NAD,   HEAD:  Atraumatic, Normocephalic  EYES: EOMI, PERRLA, conjunctiva and sclera clear    NECK: Supple, No JVD, Normal thyroid  NERVOUS SYSTEM:    CHEST/LUNG: Clear to percussion bilaterally; No rales, rhonchi,   HEART: Regular rate and rhythm;   ABDOMEN: Soft, Nontender.  EXTREMITIES:   edema:-  LYMPH: No lymphadenopathy noted        LABS:                        9.5    11.47 )-----------( 858      ( 01 Aug 2023 06:28 )             28.7     08-01    138  |  106  |  19  ----------------------------<  126<H>  4.2   |  26  |  0.53    Ca    8.5      01 Aug 2023 06:28      PT/INR - ( 30 Jul 2023 11:18 )   PT: 12.7 sec;   INR: 1.13 ratio         PTT - ( 30 Jul 2023 11:18 )  PTT:24.9 sec  Urinalysis Basic - ( 01 Aug 2023 06:28 )    Color: x / Appearance: x / SG: x / pH: x  Gluc: 126 mg/dL / Ketone: x  / Bili: x / Urobili: x   Blood: x / Protein: x / Nitrite: x   Leuk Esterase: x / RBC: x / WBC x   Sq Epi: x / Non Sq Epi: x / Bacteria: x          RADIOLOGY & ADDITIONAL STUDIES:    PATHOLOGY:         INTERVAL HISTORY:    Patient is a 52-year-old female admitted with babesiosis and bilateral lower lobe lung consolidation/pneumonia for which she was placed on Mepron and cephalosporin.  Initial parasitemia was 7% however patient did not have significant anemia.  In the course of the next 5 to 6 days had progressive anemia dyspnea hypoxemia with exertion.  We had seen her on the sixth day of her hospital stay at which point parasitemia was less than 1% however she had progressive anemia and ongoing hemolysis.  Admission ferritin was 7000.  She had low-grade fevers and the possibility of HLH was entertained therefore placed on Solu-Medrol.  From a pulmonary point of view she has had a gradual improvement in the hypoxemia/low saturation with exertion.  She has had multiple transfusions for the anemia.  In terms of the anemia work-up low haptoglobin, high LDH, and rising reticulocyte count.  Because of the elevated platelet count, lack of splenomegaly, normal fibrinogen other etiologies for the hemolysis were considered including possible Mepron induced hemolysis.  G6PD was drawn and is pending.  Patient's anemia has stabilized, respiratory status has improved.  Serum LDH is now downtrending as is the thrombocytosis which is presumably reactive patient would like to go home and is cleared for discharge from a medical and pulmonary point of vieShe underwent a bone marrow biopsy and aspirate yesterday.      I discussed this with pathology, Dr Skypalla;   The initial slides were not very spicular.  However no hemophagocytosis noted          Allergies    No Known Allergies    Intolerances        MEDICATIONS  (STANDING):  albuterol    0.083% 2.5 milliGRAM(s) Nebulizer every 8 hours  albuterol    90 MICROgram(s) HFA Inhaler 1 Puff(s) Inhalation every 4 hours  budesonide 160 MICROgram(s)/formoterol 4.5 MICROgram(s) Inhaler 2 Puff(s) Inhalation two times a day  cyanocobalamin 1000 MICROGram(s) Oral daily  folic acid 1 milliGRAM(s) Oral daily  magnesium oxide 400 milliGRAM(s) Oral at bedtime  methylPREDNISolone sodium succinate Injectable 60 milliGRAM(s) IV Push every 8 hours  multivitamin 1 Tablet(s) Oral daily  pantoprazole    Tablet 40 milliGRAM(s) Oral before breakfast  polyethylene glycol 3350 17 Gram(s) Oral two times a day  saccharomyces boulardii 250 milliGRAM(s) Oral two times a day    MEDICATIONS  (PRN):  acetaminophen     Tablet .. 650 milliGRAM(s) Oral every 6 hours PRN Mild Pain (1 - 3)  aluminum hydroxide/magnesium hydroxide/simethicone Suspension 30 milliLiter(s) Oral every 4 hours PRN Dyspepsia  benzocaine/menthol Lozenge 1 Lozenge Oral four times a day PRN Sore Throat  ibuprofen  Tablet. 400 milliGRAM(s) Oral every 6 hours PRN Temp greater or equal to 38.5C (101.3F)  lidocaine   4% Patch 1 Patch Transdermal daily PRN Pain  melatonin 3 milliGRAM(s) Oral at bedtime PRN Insomnia  ondansetron Injectable 4 milliGRAM(s) IV Push every 8 hours PRN Nausea and/or Vomiting      Vital Signs Last 24 Hrs  T(C): 36.9 (01 Aug 2023 07:07), Max: 36.9 (31 Jul 2023 21:40)  T(F): 98.4 (01 Aug 2023 07:07), Max: 98.4 (31 Jul 2023 21:40)  HR: 53 (01 Aug 2023 07:07) (52 - 74)  BP: 107/72 (01 Aug 2023 07:07) (103/65 - 114/73)  BP(mean): --  RR: 19 (01 Aug 2023 07:07) (12 - 19)  SpO2: 96% (01 Aug 2023 07:07) (95% - 98%)    Parameters below as of 01 Aug 2023 07:07  Patient On (Oxygen Delivery Method): room air        PHYSICAL EXAM:    GENERAL: NAD,   HEAD:  Atraumatic, Normocephalic  EYES: EOMI, PERRLA, conjunctiva and sclera clear    NECK: Supple, No JVD, Normal thyroid  NERVOUS SYSTEM:    CHEST/LUNG: Clear bilaterally  HEART: Regular rate and rhythm;   ABDOMEN: Soft, Nontender.  EXTREMITIES:   edema:-  LYMPH: No lymphadenopathy noted        LABS:                        9.5    11.47 )-----------( 858      ( 01 Aug 2023 06:28 )             28.7     08-01    138  |  106  |  19  ----------------------------<  126<H>  4.2   |  26  |  0.53    Ca    8.5      01 Aug 2023 06:28      PT/INR - ( 30 Jul 2023 11:18 )   PT: 12.7 sec;   INR: 1.13 ratio         PTT - ( 30 Jul 2023 11:18 )  PTT:24.9 sec  Urinalysis Basic - ( 01 Aug 2023 06:28 )    Color: x / Appearance: x / SG: x / pH: x  Gluc: 126 mg/dL / Ketone: x  / Bili: x / Urobili: x   Blood: x / Protein: x / Nitrite: x   Leuk Esterase: x / RBC: x / WBC x   Sq Epi: x / Non Sq Epi: x / Bacteria: x      Serum soluble interleukin-2 receptor greater than 3000  Fibrinogen normal  Triglycerides slightly elevated    Bone marrow biopsy and aspirate.  Pathology reviewed the aspirate.  Spicules were not prominent however no overt hemophagocytosis noted

## 2023-08-01 NOTE — PROGRESS NOTE ADULT - ASSESSMENT
Babesiosis status post Mepron  Presumed pneumonia status post antibiotics  History of hemolysis/rule out Mepron related  Possibility of HLH entertained/presently no supporting evidence including thrombocytosis, lack of splenomegaly, normal fibrinogen.  Marrow aspirate no overt hemophagocytosis.  Anemia/multifactorial/hemolysis/stable      Patient has been cleared for discharge from a pulmonary and medical point of view  From a hematology point of view she appears stable    Recommendations    Continue folate 1 mg p.o. daily  Taper off steroids  We will advise her to see as an outpatient at the end of the week for repeat labs CBC  Check G6PD.  If normal will repeat G6PD in 4 weeks

## 2023-08-01 NOTE — DISCHARGE NOTE NURSING/CASE MANAGEMENT/SOCIAL WORK - NSDCPEFALRISK_GEN_ALL_CORE
For information on Fall & Injury Prevention, visit: [email or web address] OR  [email or web address] OR  https://www.cdc.gov/steadi/patient.html

## 2023-08-01 NOTE — DISCHARGE NOTE PROVIDER - NSDCMRMEDTOKEN_GEN_ALL_CORE_FT
biotin 5 mg oral capsule: 1 cap(s) orally once a day  clonazePAM 0.5 mg oral tablet: 1 tab(s) orally once a day (at bedtime) as needed for sleep  dextroamphetamine-amphetamine 10 mg oral tablet: 1 tab(s) orally once a day  dextroamphetamine-amphetamine 20 mg oral capsule, extended release: 1 cap(s) orally once a day as needed for ADD management  Fish Oil 1000 mg oral capsule: 1 cap(s) orally once a day  folic acid 1 mg oral tablet: 1 tab(s) orally once a day  magnesium oxide 400 mg oral tablet: 1 tab(s) orally once a day (at bedtime)  Multiple Vitamins oral tablet: 1 tab(s) orally once a day  pantoprazole 40 mg oral delayed release tablet: 1 tab(s) orally once a day (before a meal)  predniSONE 10 mg oral tablet: 3 tab(s) orally once a day 3 tabs X 1 day on 8/2, 2 tab X 2 days on 8/3 and 8/4, 1 tab daily on 8/5 then stop  Probiotic Formula oral capsule: 1 cap(s) orally once a day  Vitamin B-12 1000 mcg oral tablet: 1 tab(s) orally once a day

## 2023-08-01 NOTE — DISCHARGE NOTE PROVIDER - HOSPITAL COURSE
· Assessment	  52 year old female PMHx significant for ulcerative colitis presents to the [facility] on 7/19/23  for further evaluation and management of symptoms of progressively worsening generalized weakness, malaise/fatigue, myalgias, subjective fevers (TMax at home 103.7'F), generalized abdominal discomfort and decreased PO intake which began on Friday (7/14).  The patient states that her symptoms progressed despite taking Tylenol and Motrin prn. Of note the patient reportedly tested negative for Influenza and COVID-19. Upon triage the patient met sepsis criteria => Vital signs in triage => BP 86/53, HR 70/min, RR 27/min, SpO2 97% on room air. In the ED CCM was consulted to further evaluate.     hospital course - pt admitted and found to have babesia complicated by hemolysis.  pt completed full course of mepron and azithromycin.  given worsening anemia and elevated ferritin and TG and elevated soluble IL 2 receptor she was started on IV steroids.  had BM bx on 7/31 and awaiting results. pt seen by dr villa and will be seen by him for follow up of bx and pending lab results on friday.  plan to dc on po steroids as per heme recs. possible this is G6PD causing anemia in setting of mepron.  heme will f/u labs.  pt s/p 4 units PRBC and hbg now stable slowly improving 9.5 at time of dc.      # r/o HLH vs G6PD deficiency causing hemolysis  - case d/w dr villa and results discussed with patient  - continue solumedrol 60 mg q8h -> dc on prednisone taper   - f/u bm bx results as outpt. pt has appt friday   - criteria met for HLH so far: elevated ferritin, fever (?babesia), anemia and thrombocytopenia (now thrombocytosis) again not clear given initial pres with babesia, elevated IL2 soluble receptor ab.  elevated TG.  normal fibrinogen levels.    - transfused 1 unit prbc 7/31 - total of 4 units    # babesia - resolved. sepsis POA  - Babesia microti - PCR positive , - parasitemia 7.4% --> 2--> 1 % -> undetectable  - completed 10 day course of  Atovaquone 750mg po q12h, . Azithromycin 500mg IVPB daily needs 10 days course     # Acute hypoxic respiratory failure due to atelectasis vs bilateral gram negative lisa Pneumonia vs 2/2 intracapillary hemolysis  - CT chest - mild septal thickening in both lungs - ? edema, vs infection vs intracapillary hemolysis causing this and this d/w pt and dr villa/ray and need for exchange transfusion   - CTA 7/23 - New bilateral lower lobe consolidation,  doppler LE - no DVT      # 1.4 cm right hepatic lesion with transaminitis   - MRCP - likely hemangioma , o/p repeat test   - CT abd - noted  - hepatitis panel neg     # Hematuria - check UA, urine cx negative. resolved     # UC - add probiotics; stable, not on immunosuppressants    # Anxiety ~cont. Clonazepam 0.5mg po qhs prn    #ADHD ~will hold Dextroamphetamine-Amphetamine (takes 10mg po daily), takes Dextroamphetamine-Amphetamine ER 20mg daily prn    VTE PX ~cont. SCDs for now CTA - neg for PE, no DVT

## 2023-08-01 NOTE — PROGRESS NOTE ADULT - SUBJECTIVE AND OBJECTIVE BOX
Patient is a 52y old  Female who presents with a chief complaint of Generalized Malaise, Fatigue, Myalgias, Fever, Abdominal Pain, Nausea, decreased PO intake. (22 Jul 2023 14:46)      HPI:  52 year old female PMHx significant for ulcerative colitis presents to the [facility] for further evaluation and management of symptoms of progressively worsening generalized weakness, malaise/fatigue, myalgias, subjective fevers (TMax at home 103.7'F), generalized abdominal discomfort and decreased PO intake which began on Friday (7/14).  The patient states that her symptoms progressed despite taking Tylenol and Motrin prn. Of note the patient reportedly tested negative for Influenza and COVID-19. Upon triage the patient met sepsis criteria => Vital signs in triage => BP 86/53, HR 70/min, RR 27/min, SpO2 97% on room air. In the ED CCM was consulted to further evaluate.   Found to have Babesiosis   CT Chest revealed findings concerning of mild pulmonary edema (likely non cardiogenic)/parasite related     8/1  Dyspnea improving after lasix  XR shows improvement  s/p bone marrow bx on 7/31  6MWT performed yesterday there was no evidence of ambulatory desaturations     MEDICATIONS  (STANDING):  albuterol    0.083% 2.5 milliGRAM(s) Nebulizer every 8 hours  albuterol    90 MICROgram(s) HFA Inhaler 1 Puff(s) Inhalation every 4 hours  budesonide 160 MICROgram(s)/formoterol 4.5 MICROgram(s) Inhaler 2 Puff(s) Inhalation two times a day  cyanocobalamin 1000 MICROGram(s) Oral daily  folic acid 1 milliGRAM(s) Oral daily  magnesium oxide 400 milliGRAM(s) Oral at bedtime  methylPREDNISolone sodium succinate Injectable 60 milliGRAM(s) IV Push every 8 hours  multivitamin 1 Tablet(s) Oral daily  polyethylene glycol 3350 17 Gram(s) Oral two times a day  saccharomyces boulardii 250 milliGRAM(s) Oral two times a day  sodium chloride 0.9%. 1000 milliLiter(s) (100 mL/Hr) IV Continuous <Continuous>    MEDICATIONS  (PRN):  acetaminophen     Tablet .. 650 milliGRAM(s) Oral every 6 hours PRN Temp greater or equal to 38C (100.4F), Mild Pain (1 - 3)  aluminum hydroxide/magnesium hydroxide/simethicone Suspension 30 milliLiter(s) Oral every 4 hours PRN Dyspepsia  benzocaine/menthol Lozenge 1 Lozenge Oral four times a day PRN Sore Throat  ibuprofen  Tablet. 400 milliGRAM(s) Oral every 6 hours PRN Temp greater or equal to 38.5C (101.3F)  lidocaine   4% Patch 1 Patch Transdermal daily PRN Pain  melatonin 3 milliGRAM(s) Oral at bedtime PRN Insomnia  ondansetron Injectable 4 milliGRAM(s) IV Push every 8 hours PRN Nausea and/or Vomiting    Vital Signs Last 24 Hrs  T(C): 36.9 (01 Aug 2023 07:07), Max: 36.9 (31 Jul 2023 21:40)  T(F): 98.4 (01 Aug 2023 07:07), Max: 98.4 (31 Jul 2023 21:40)  HR: 53 (01 Aug 2023 07:07) (52 - 74)  BP: 107/72 (01 Aug 2023 07:07) (103/65 - 114/73)  BP(mean): --  RR: 19 (01 Aug 2023 07:07) (12 - 19)  SpO2: 96% (01 Aug 2023 07:07) (95% - 98%)    Parameters below as of 01 Aug 2023 07:07  Patient On (Oxygen Delivery Method): room air          I&O's Summary    PHYSICAL EXAM  General Appearance: cooperative, no acute distress,   HEENT: PERRL, conjunctiva clear, EOM's intact, non injected pharynx, no exudate, TM   normal  Neck: Supple, , no adenopathy, thyroid: not enlarged, no carotid bruit or JVD  Back: Symmetric, no  tenderness,no soft tissue tenderness  Lungs: breath sounds have improved, chest expansion is less limited, no longer has cough with exhalation  Heart: Regular rate and rhythm, S1, S2 normal, no murmur, rub or gallop  Abdomen: Soft, non-tender, bowel sounds active , no hepatosplenomegaly  Extremities: no cyanosis or edema, no joint swelling  Skin: Skin color, texture normal, no rashes   Neurologic: Alert and oriented X3 , cranial nerves intact, sensory and motor normal,    ECG:    LABS:                                     8.2    6.88  )-----------( 530      ( 26 Jul 2023 07:02 )             23.8   07-26    136  |  103  |  7   ----------------------------<  100<H>  4.0   |  29  |  0.64    Ca    8.1<L>      26 Jul 2023 07:02  Phos  3.5     07-26  Mg     2.2     07-26          RADIOLOGY & ADDITIONAL STUDIES:

## 2023-08-02 LAB — TM INTERPRETATION: SIGNIFICANT CHANGE UP

## 2023-08-03 DIAGNOSIS — J18.9 PNEUMONIA, UNSPECIFIED ORGANISM: ICD-10-CM

## 2023-08-03 DIAGNOSIS — J96.01 ACUTE RESPIRATORY FAILURE WITH HYPOXIA: ICD-10-CM

## 2023-08-03 DIAGNOSIS — D64.9 ANEMIA, UNSPECIFIED: ICD-10-CM

## 2023-08-03 DIAGNOSIS — F90.9 ATTENTION-DEFICIT HYPERACTIVITY DISORDER, UNSPECIFIED TYPE: ICD-10-CM

## 2023-08-03 DIAGNOSIS — A68.1 TICK-BORNE RELAPSING FEVER: ICD-10-CM

## 2023-08-03 DIAGNOSIS — E83.51 HYPOCALCEMIA: ICD-10-CM

## 2023-08-03 DIAGNOSIS — E87.6 HYPOKALEMIA: ICD-10-CM

## 2023-08-03 DIAGNOSIS — Z20.822 CONTACT WITH AND (SUSPECTED) EXPOSURE TO COVID-19: ICD-10-CM

## 2023-08-03 DIAGNOSIS — E87.1 HYPO-OSMOLALITY AND HYPONATREMIA: ICD-10-CM

## 2023-08-03 DIAGNOSIS — A41.9 SEPSIS, UNSPECIFIED ORGANISM: ICD-10-CM

## 2023-08-03 DIAGNOSIS — J98.11 ATELECTASIS: ICD-10-CM

## 2023-08-03 DIAGNOSIS — E88.09 OTHER DISORDERS OF PLASMA-PROTEIN METABOLISM, NOT ELSEWHERE CLASSIFIED: ICD-10-CM

## 2023-08-03 DIAGNOSIS — D69.6 THROMBOCYTOPENIA, UNSPECIFIED: ICD-10-CM

## 2023-08-03 DIAGNOSIS — K51.90 ULCERATIVE COLITIS, UNSPECIFIED, WITHOUT COMPLICATIONS: ICD-10-CM

## 2023-08-03 DIAGNOSIS — B60.00 BABESIOSIS, UNSPECIFIED: ICD-10-CM

## 2023-08-04 LAB
G6PD RBC-CCNC: 21.6 U/G HGB — HIGH (ref 7–20.5)
HEMATOPATHOLOGY REPORT: SIGNIFICANT CHANGE UP

## 2023-08-16 LAB — CHROM ANALY OVERALL INTERP SPEC-IMP: SIGNIFICANT CHANGE UP

## 2023-09-27 NOTE — PROGRESS NOTE ADULT - SUBJECTIVE AND OBJECTIVE BOX
Patient is a 52y old  Female who presents with a chief complaint of Generalized Malaise, Fatigue, Myalgias, Fever, Abdominal Pain, Nausea, decreased PO intake. (22 Jul 2023 14:46)      HPI:  52 year old female PMHx significant for ulcerative colitis presents to the [facility] for further evaluation and management of symptoms of progressively worsening generalized weakness, malaise/fatigue, myalgias, subjective fevers (TMax at home 103.7'F), generalized abdominal discomfort and decreased PO intake which began on Friday (7/14).  The patient states that her symptoms progressed despite taking Tylenol and Motrin prn. Of note the patient reportedly tested negative for Influenza and COVID-19. Upon triage the patient met sepsis criteria => Vital signs in triage => BP 86/53, HR 70/min, RR 27/min, SpO2 97% on room air. In the ED CCM was consulted to further evaluate.   Found to have Babesiosis   CT Chest revealed findings concerning of mild pulmonary edema (likely non cardiogenic)/parasite related     7/26  Patient is still having desaturations to 88% with minimal exertion  on 2 L NC O 2  she denies any active dyspnea when this occurs     MEDICATIONS  (STANDING):  albuterol    0.083% 2.5 milliGRAM(s) Nebulizer every 8 hours  albuterol    90 MICROgram(s) HFA Inhaler 1 Puff(s) Inhalation every 4 hours  atovaquone  Suspension 750 milliGRAM(s) Oral two times a day  azithromycin  IVPB 500 milliGRAM(s) IV Intermittent every 24 hours  budesonide 160 MICROgram(s)/formoterol 4.5 MICROgram(s) Inhaler 2 Puff(s) Inhalation two times a day  cefTRIAXone Injectable.      cefTRIAXone Injectable. 1000 milliGRAM(s) IV Push every 24 hours  cyanocobalamin 1000 MICROGram(s) Oral daily  guaiFENesin Oral Liquid (Sugar-Free) 200 milliGRAM(s) Oral every 6 hours  magnesium oxide 400 milliGRAM(s) Oral at bedtime  multivitamin 1 Tablet(s) Oral daily  potassium phosphate / sodium phosphate Tablet (K-PHOS No. 2) 1 Tablet(s) Oral three times a day  saccharomyces boulardii 250 milliGRAM(s) Oral two times a day    MEDICATIONS  (PRN):  acetaminophen     Tablet .. 650 milliGRAM(s) Oral every 6 hours PRN Temp greater or equal to 38C (100.4F), Mild Pain (1 - 3)  aluminum hydroxide/magnesium hydroxide/simethicone Suspension 30 milliLiter(s) Oral every 4 hours PRN Dyspepsia  benzocaine/menthol Lozenge 1 Lozenge Oral four times a day PRN Sore Throat  clonazePAM  Tablet 0.5 milliGRAM(s) Oral at bedtime PRN Insomnia  ibuprofen  Tablet. 400 milliGRAM(s) Oral every 6 hours PRN Temp greater or equal to 38.5C (101.3F)  melatonin 3 milliGRAM(s) Oral at bedtime PRN Insomnia  ondansetron Injectable 4 milliGRAM(s) IV Push every 8 hours PRN Nausea and/or Vomiting      Vital Signs Last 24 Hrs  T(C): 37.5 (26 Jul 2023 08:28), Max: 38.9 (25 Jul 2023 10:19)  T(F): 99.5 (26 Jul 2023 08:28), Max: 102 (25 Jul 2023 10:19)  HR: 75 (26 Jul 2023 08:28) (61 - 80)  BP: 102/61 (26 Jul 2023 08:28) (93/57 - 106/61)  BP(mean): --  RR: 18 (26 Jul 2023 08:28) (18 - 18)  SpO2: 95% (26 Jul 2023 08:28) (92% - 99%)    Parameters below as of 26 Jul 2023 08:28  Patient On (Oxygen Delivery Method): nasal cannula  O2 Flow (L/min): 2          I&O's Summary    PHYSICAL EXAM  General Appearance: cooperative, no acute distress,   HEENT: PERRL, conjunctiva clear, EOM's intact, non injected pharynx, no exudate, TM   normal  Neck: Supple, , no adenopathy, thyroid: not enlarged, no carotid bruit or JVD  Back: Symmetric, no  tenderness,no soft tissue tenderness  Lungs: diminished at the bases, cough is present with exhalation   Heart: Regular rate and rhythm, S1, S2 normal, no murmur, rub or gallop  Abdomen: Soft, non-tender, bowel sounds active , no hepatosplenomegaly  Extremities: no cyanosis or edema, no joint swelling  Skin: Skin color, texture normal, no rashes   Neurologic: Alert and oriented X3 , cranial nerves intact, sensory and motor normal,    ECG:    LABS:                          8.5    5.42  )-----------( 216      ( 23 Jul 2023 06:46 )             24.0     07-23    135  |  100  |  9   ----------------------------<  119<H>  3.0<L>   |  29  |  0.66    Ca    7.5<L>      23 Jul 2023 06:46  Phos  2.8     07-23  Mg     1.9     07-23    TPro  5.8<L>  /  Alb  1.6<L>  /  TBili  1.1  /  DBili  x   /  AST  128<H>  /  ALT  121<H>  /  AlkPhos  105  07-23    CARDIAC MARKERS ( 22 Jul 2023 06:45 )  x     / x     / 169 U/L / x     / x                Urinalysis Basic - ( 23 Jul 2023 06:46 )    Color: x / Appearance: x / SG: x / pH: x  Gluc: 119 mg/dL / Ketone: x  / Bili: x / Urobili: x   Blood: x / Protein: x / Nitrite: x   Leuk Esterase: x / RBC: x / WBC x   Sq Epi: x / Non Sq Epi: x / Bacteria: x            RADIOLOGY & ADDITIONAL STUDIES:     Topical Ketoconazole Counseling: Patient counseled that this medication may cause skin irritation or allergic reactions.  In the event of skin irritation, the patient was advised to reduce the amount of the drug applied or use it less frequently.   The patient verbalized understanding of the proper use and possible adverse effects of ketoconazole.  All of the patient's questions and concerns were addressed.

## 2023-10-04 ENCOUNTER — TRANSCRIPTION ENCOUNTER (OUTPATIENT)
Age: 53
End: 2023-10-04

## 2023-10-25 LAB
HEMATOPATHOLOGY REPORT: SIGNIFICANT CHANGE UP
HEMATOPATHOLOGY REPORT: SIGNIFICANT CHANGE UP

## 2023-11-14 ENCOUNTER — NON-APPOINTMENT (OUTPATIENT)
Age: 53
End: 2023-11-14

## 2024-01-31 ENCOUNTER — APPOINTMENT (OUTPATIENT)
Dept: FAMILY MEDICINE | Facility: CLINIC | Age: 54
End: 2024-01-31
Payer: COMMERCIAL

## 2024-01-31 VITALS
SYSTOLIC BLOOD PRESSURE: 118 MMHG | HEART RATE: 78 BPM | DIASTOLIC BLOOD PRESSURE: 76 MMHG | TEMPERATURE: 98 F | BODY MASS INDEX: 24.41 KG/M2 | HEIGHT: 64 IN | WEIGHT: 143 LBS | OXYGEN SATURATION: 99 %

## 2024-01-31 DIAGNOSIS — Z76.0 ENCOUNTER FOR ISSUE OF REPEAT PRESCRIPTION: ICD-10-CM

## 2024-01-31 DIAGNOSIS — J30.1 ALLERGIC RHINITIS DUE TO POLLEN: ICD-10-CM

## 2024-01-31 DIAGNOSIS — Z87.898 PERSONAL HISTORY OF OTHER SPECIFIED CONDITIONS: ICD-10-CM

## 2024-01-31 DIAGNOSIS — Z23 ENCOUNTER FOR IMMUNIZATION: ICD-10-CM

## 2024-01-31 DIAGNOSIS — Z11.59 ENCOUNTER FOR SCREENING FOR OTHER VIRAL DISEASES: ICD-10-CM

## 2024-01-31 DIAGNOSIS — F98.8 OTHER SPECIFIED BEHAVIORAL AND EMOTIONAL DISORDERS WITH ONSET USUALLY OCCURRING IN CHILDHOOD AND ADOLESCENCE: ICD-10-CM

## 2024-01-31 DIAGNOSIS — R50.9 FEVER, UNSPECIFIED: ICD-10-CM

## 2024-01-31 DIAGNOSIS — F41.8 OTHER SPECIFIED ANXIETY DISORDERS: ICD-10-CM

## 2024-01-31 DIAGNOSIS — F41.9 ANXIETY DISORDER, UNSPECIFIED: ICD-10-CM

## 2024-01-31 PROCEDURE — G2211 COMPLEX E/M VISIT ADD ON: CPT

## 2024-01-31 PROCEDURE — 99213 OFFICE O/P EST LOW 20 MIN: CPT

## 2024-01-31 RX ORDER — AZITHROMYCIN 250 MG/1
250 TABLET, FILM COATED ORAL
Qty: 1 | Refills: 0 | Status: DISCONTINUED | COMMUNITY
Start: 2023-07-20 | End: 2024-01-31

## 2024-01-31 RX ORDER — ESTRADIOL 0.1 MG/G
0.1 CREAM VAGINAL
Refills: 0 | Status: DISCONTINUED | COMMUNITY
End: 2024-01-31

## 2024-01-31 NOTE — HISTORY OF PRESENT ILLNESS
[de-identified] : Follow up [facility] babesia 08/23 in [facility], anxiety, ADD, HLD, UC Feeling well Needs refill on Xanax- being prescribed 30 pills every 5 months

## 2024-04-16 ENCOUNTER — APPOINTMENT (OUTPATIENT)
Dept: INTERNAL MEDICINE | Facility: CLINIC | Age: 54
End: 2024-04-16

## 2024-05-16 ENCOUNTER — TRANSCRIPTION ENCOUNTER (OUTPATIENT)
Age: 54
End: 2024-05-16

## 2024-05-22 ENCOUNTER — TRANSCRIPTION ENCOUNTER (OUTPATIENT)
Age: 54
End: 2024-05-22

## 2024-05-22 RX ORDER — CLONAZEPAM 0.5 MG/1
0.5 TABLET ORAL
Qty: 30 | Refills: 0 | Status: ACTIVE | COMMUNITY
Start: 2021-04-14 | End: 1900-01-01

## 2024-07-26 ENCOUNTER — NON-APPOINTMENT (OUTPATIENT)
Age: 54
End: 2024-07-26

## 2024-08-19 ENCOUNTER — APPOINTMENT (OUTPATIENT)
Dept: INTERNAL MEDICINE | Facility: CLINIC | Age: 54
End: 2024-08-19

## 2024-08-29 DIAGNOSIS — M25.569 PAIN IN UNSPECIFIED KNEE: ICD-10-CM

## 2024-09-24 ENCOUNTER — APPOINTMENT (OUTPATIENT)
Dept: INTERNAL MEDICINE | Facility: CLINIC | Age: 54
End: 2024-09-24

## 2025-06-21 ENCOUNTER — TRANSCRIPTION ENCOUNTER (OUTPATIENT)
Age: 55
End: 2025-06-21

## 2025-06-21 ENCOUNTER — EMERGENCY (EMERGENCY)
Facility: HOSPITAL | Age: 55
LOS: 0 days | Discharge: HOME | End: 2025-06-21
Attending: EMERGENCY MEDICINE
Payer: COMMERCIAL

## 2025-06-21 VITALS
DIASTOLIC BLOOD PRESSURE: 64 MMHG | OXYGEN SATURATION: 100 % | SYSTOLIC BLOOD PRESSURE: 117 MMHG | HEART RATE: 57 BPM | RESPIRATION RATE: 16 BRPM | TEMPERATURE: 99 F

## 2025-06-21 VITALS
SYSTOLIC BLOOD PRESSURE: 115 MMHG | HEART RATE: 58 BPM | OXYGEN SATURATION: 100 % | TEMPERATURE: 98 F | DIASTOLIC BLOOD PRESSURE: 66 MMHG | RESPIRATION RATE: 18 BRPM
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
## 2025-06-21 DIAGNOSIS — M79.601 PAIN IN RIGHT ARM: ICD-10-CM

## 2025-06-21 DIAGNOSIS — M54.2 CERVICALGIA: ICD-10-CM

## 2025-06-21 DIAGNOSIS — S12.500A UNSPECIFIED DISPLACED FRACTURE OF SIXTH CERVICAL VERTEBRA, INITIAL ENCOUNTER FOR CLOSED FRACTURE: ICD-10-CM

## 2025-06-21 DIAGNOSIS — Z98.891 HISTORY OF UTERINE SCAR FROM PREVIOUS SURGERY: Chronic | ICD-10-CM
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
## 2025-06-21 DIAGNOSIS — K51.90 ULCERATIVE COLITIS, UNSPECIFIED, WITHOUT COMPLICATIONS: ICD-10-CM

## 2025-06-21 LAB
ALBUMIN SERPL ELPH-MCNC: 3.9 G/DL — SIGNIFICANT CHANGE UP (ref 3.3–5)
ALP SERPL-CCNC: 39 U/L — LOW (ref 40–120)
ALT FLD-CCNC: 27 U/L — SIGNIFICANT CHANGE UP (ref 12–78)
AST SERPL-CCNC: 25 U/L — SIGNIFICANT CHANGE UP (ref 15–37)
BASOPHILS # BLD AUTO: 0.07 K/UL — SIGNIFICANT CHANGE UP (ref 0–0.2)
BASOPHILS NFR BLD AUTO: 0.6 % — SIGNIFICANT CHANGE UP (ref 0–2)
BILIRUB SERPL-MCNC: 0.4 MG/DL — SIGNIFICANT CHANGE UP (ref 0.2–1.2)
BUN SERPL-MCNC: 16 MG/DL — SIGNIFICANT CHANGE UP (ref 7–23)
CALCIUM SERPL-MCNC: 9.6 MG/DL — SIGNIFICANT CHANGE UP (ref 8.5–10.1)
CHLORIDE SERPL-SCNC: 106 MMOL/L — SIGNIFICANT CHANGE UP (ref 96–108)
CO2 SERPL-SCNC: 31 MMOL/L — SIGNIFICANT CHANGE UP (ref 22–31)
CREAT SERPL-MCNC: 1.02 MG/DL — SIGNIFICANT CHANGE UP (ref 0.5–1.3)
EGFR: 65 ML/MIN/1.73M2 — SIGNIFICANT CHANGE UP
EGFR: 65 ML/MIN/1.73M2 — SIGNIFICANT CHANGE UP
EOSINOPHIL # BLD AUTO: 0.12 K/UL — SIGNIFICANT CHANGE UP (ref 0–0.5)
EOSINOPHIL NFR BLD AUTO: 1.1 % — SIGNIFICANT CHANGE UP (ref 0–6)
GLUCOSE SERPL-MCNC: 99 MG/DL — SIGNIFICANT CHANGE UP (ref 70–99)
HCT VFR BLD CALC: 43.2 % — SIGNIFICANT CHANGE UP (ref 34.5–45)
HGB BLD-MCNC: 14.1 G/DL — SIGNIFICANT CHANGE UP (ref 11.5–15.5)
IMM GRANULOCYTES # BLD AUTO: 0.03 K/UL — SIGNIFICANT CHANGE UP (ref 0–0.07)
IMM GRANULOCYTES NFR BLD AUTO: 0.3 % — SIGNIFICANT CHANGE UP (ref 0–0.9)
LYMPHOCYTES # BLD AUTO: 1.37 K/UL — SIGNIFICANT CHANGE UP (ref 1–3.3)
LYMPHOCYTES NFR BLD AUTO: 12.6 % — LOW (ref 13–44)
MCHC RBC-ENTMCNC: 29.7 PG — SIGNIFICANT CHANGE UP (ref 27–34)
MCHC RBC-ENTMCNC: 32.6 G/DL — SIGNIFICANT CHANGE UP (ref 32–36)
MCV RBC AUTO: 91.1 FL — SIGNIFICANT CHANGE UP (ref 80–100)
MONOCYTES # BLD AUTO: 0.92 K/UL — HIGH (ref 0–0.9)
MONOCYTES NFR BLD AUTO: 8.5 % — SIGNIFICANT CHANGE UP (ref 2–14)
NEUTROPHILS # BLD AUTO: 8.33 K/UL — HIGH (ref 1.8–7.4)
NEUTROPHILS NFR BLD AUTO: 76.9 % — SIGNIFICANT CHANGE UP (ref 43–77)
NRBC # BLD AUTO: 0 K/UL — SIGNIFICANT CHANGE UP (ref 0–0)
NRBC # FLD: 0 K/UL — SIGNIFICANT CHANGE UP (ref 0–0)
NRBC BLD AUTO-RTO: 0 /100 WBCS — SIGNIFICANT CHANGE UP (ref 0–0)
PLATELET # BLD AUTO: 396 K/UL — SIGNIFICANT CHANGE UP (ref 150–400)
PMV BLD: 8.1 FL — SIGNIFICANT CHANGE UP (ref 7–13)
POTASSIUM SERPL-MCNC: 4.9 MMOL/L — SIGNIFICANT CHANGE UP (ref 3.5–5.3)
POTASSIUM SERPL-SCNC: 4.9 MMOL/L — SIGNIFICANT CHANGE UP (ref 3.5–5.3)
PROT SERPL-MCNC: 7.7 GM/DL — SIGNIFICANT CHANGE UP (ref 6–8.3)
RBC # BLD: 4.74 M/UL — SIGNIFICANT CHANGE UP (ref 3.8–5.2)
RBC # FLD: 13.4 % — SIGNIFICANT CHANGE UP (ref 10.3–14.5)
SODIUM SERPL-SCNC: 137 MMOL/L — SIGNIFICANT CHANGE UP (ref 135–145)
WBC # BLD: 10.84 K/UL — HIGH (ref 3.8–10.5)
WBC # FLD AUTO: 10.84 K/UL — HIGH (ref 3.8–10.5)

## 2025-06-21 PROCEDURE — 73110 X-RAY EXAM OF WRIST: CPT | Mod: RT

## 2025-06-21 PROCEDURE — 96374 THER/PROPH/DIAG INJ IV PUSH: CPT | Mod: XU

## 2025-06-21 PROCEDURE — 73090 X-RAY EXAM OF FOREARM: CPT | Mod: 26,RT

## 2025-06-21 PROCEDURE — 71260 CT THORAX DX C+: CPT

## 2025-06-21 PROCEDURE — 73080 X-RAY EXAM OF ELBOW: CPT | Mod: 26,RT

## 2025-06-21 PROCEDURE — 73110 X-RAY EXAM OF WRIST: CPT | Mod: 26,RT

## 2025-06-21 PROCEDURE — 73080 X-RAY EXAM OF ELBOW: CPT | Mod: RT

## 2025-06-21 PROCEDURE — 72125 CT NECK SPINE W/O DYE: CPT

## 2025-06-21 PROCEDURE — 99284 EMERGENCY DEPT VISIT MOD MDM: CPT | Mod: 25

## 2025-06-21 PROCEDURE — 99285 EMERGENCY DEPT VISIT HI MDM: CPT

## 2025-06-21 PROCEDURE — 85025 COMPLETE CBC W/AUTO DIFF WBC: CPT

## 2025-06-21 PROCEDURE — 80053 COMPREHEN METABOLIC PANEL: CPT

## 2025-06-21 PROCEDURE — 73090 X-RAY EXAM OF FOREARM: CPT | Mod: RT

## 2025-06-21 PROCEDURE — 71260 CT THORAX DX C+: CPT | Mod: 26

## 2025-06-21 PROCEDURE — 36415 COLL VENOUS BLD VENIPUNCTURE: CPT

## 2025-06-21 PROCEDURE — 72125 CT NECK SPINE W/O DYE: CPT | Mod: 26

## 2025-06-21 PROCEDURE — 70498 CT ANGIOGRAPHY NECK: CPT | Mod: 26

## 2025-06-21 PROCEDURE — 70498 CT ANGIOGRAPHY NECK: CPT

## 2025-06-21 PROCEDURE — 96375 TX/PRO/DX INJ NEW DRUG ADDON: CPT | Mod: XU

## 2025-06-21 RX ORDER — DEXAMETHASONE 0.5 MG/1
1 TABLET ORAL
Qty: 4 | Refills: 0
Start: 2025-06-21

## 2025-06-21 RX ORDER — CYCLOBENZAPRINE HYDROCHLORIDE 15 MG/1
1 CAPSULE, EXTENDED RELEASE ORAL
Qty: 30 | Refills: 0
Start: 2025-06-21

## 2025-06-21 RX ORDER — ACETAMINOPHEN 500 MG/5ML
1000 LIQUID (ML) ORAL ONCE
Refills: 0 | Status: COMPLETED | OUTPATIENT
Start: 2025-06-21 | End: 2025-06-21

## 2025-06-21 RX ORDER — DEXAMETHASONE 0.5 MG/1
4 TABLET ORAL ONCE
Refills: 0 | Status: COMPLETED | OUTPATIENT
Start: 2025-06-21 | End: 2025-06-21

## 2025-06-21 RX ORDER — KETOROLAC TROMETHAMINE 30 MG/ML
15 INJECTION, SOLUTION INTRAMUSCULAR; INTRAVENOUS ONCE
Refills: 0 | Status: DISCONTINUED | OUTPATIENT
Start: 2025-06-21 | End: 2025-06-21

## 2025-06-21 RX ORDER — OXYCODONE HYDROCHLORIDE AND ACETAMINOPHEN 10; 325 MG/1; MG/1
1 TABLET ORAL
Qty: 12 | Refills: 0
Start: 2025-06-21 | End: 2025-06-23

## 2025-06-21 RX ADMIN — Medication 400 MILLIGRAM(S): at 16:38

## 2025-06-21 RX ADMIN — DEXAMETHASONE 4 MILLIGRAM(S): 0.5 TABLET ORAL at 18:38

## 2025-06-21 RX ADMIN — KETOROLAC TROMETHAMINE 15 MILLIGRAM(S): 30 INJECTION, SOLUTION INTRAMUSCULAR; INTRAVENOUS at 16:38

## 2025-06-23 ENCOUNTER — APPOINTMENT (OUTPATIENT)
Age: 55
End: 2025-06-23
Payer: COMMERCIAL

## 2025-06-23 PROBLEM — R29.898 HAND WEAKNESS: Status: ACTIVE | Noted: 2025-06-23

## 2025-06-23 PROCEDURE — 99203 OFFICE O/P NEW LOW 30 MIN: CPT

## 2025-06-25 ENCOUNTER — NON-APPOINTMENT (OUTPATIENT)
Age: 55
End: 2025-06-25

## 2025-06-28 ENCOUNTER — APPOINTMENT (OUTPATIENT)
Dept: MRI IMAGING | Facility: CLINIC | Age: 55
End: 2025-06-28

## 2025-07-08 ENCOUNTER — APPOINTMENT (OUTPATIENT)
Dept: NEUROSURGERY | Facility: CLINIC | Age: 55
End: 2025-07-08
Payer: COMMERCIAL

## 2025-07-08 PROCEDURE — 99212 OFFICE O/P EST SF 10 MIN: CPT | Mod: 93

## 2025-07-21 ENCOUNTER — NON-APPOINTMENT (OUTPATIENT)
Age: 55
End: 2025-07-21

## 2025-08-08 ENCOUNTER — NON-APPOINTMENT (OUTPATIENT)
Age: 55
End: 2025-08-08

## 2025-08-08 ENCOUNTER — APPOINTMENT (OUTPATIENT)
Dept: RADIOLOGY | Facility: CLINIC | Age: 55
End: 2025-08-08

## 2025-08-08 ENCOUNTER — OUTPATIENT (OUTPATIENT)
Dept: OUTPATIENT SERVICES | Facility: HOSPITAL | Age: 55
LOS: 1 days | End: 2025-08-08
Payer: COMMERCIAL

## 2025-08-08 DIAGNOSIS — Z00.8 ENCOUNTER FOR OTHER GENERAL EXAMINATION: ICD-10-CM

## 2025-08-08 DIAGNOSIS — Z98.891 HISTORY OF UTERINE SCAR FROM PREVIOUS SURGERY: Chronic | ICD-10-CM

## 2025-08-08 PROCEDURE — 72040 X-RAY EXAM NECK SPINE 2-3 VW: CPT | Mod: 26

## 2025-08-08 PROCEDURE — 72040 X-RAY EXAM NECK SPINE 2-3 VW: CPT

## 2025-08-11 ENCOUNTER — APPOINTMENT (OUTPATIENT)
Dept: NEUROSURGERY | Facility: CLINIC | Age: 55
End: 2025-08-11
Payer: COMMERCIAL

## 2025-08-11 DIAGNOSIS — S12.600A UNSPECIFIED DISPLACED FRACTURE OF SEVENTH CERVICAL VERTEBRA, INITIAL ENCOUNTER FOR CLOSED FRACTURE: ICD-10-CM

## 2025-08-11 PROCEDURE — 99213 OFFICE O/P EST LOW 20 MIN: CPT

## 2025-08-25 ENCOUNTER — APPOINTMENT (OUTPATIENT)
Dept: NEUROSURGERY | Facility: CLINIC | Age: 55
End: 2025-08-25